# Patient Record
Sex: FEMALE | Race: WHITE | NOT HISPANIC OR LATINO | Employment: OTHER
[De-identification: names, ages, dates, MRNs, and addresses within clinical notes are randomized per-mention and may not be internally consistent; named-entity substitution may affect disease eponyms.]

---

## 2017-04-03 ENCOUNTER — RECORDS - HEALTHEAST (OUTPATIENT)
Dept: ADMINISTRATIVE | Facility: OTHER | Age: 52
End: 2017-04-03

## 2017-05-24 ENCOUNTER — HOSPITAL ENCOUNTER (OUTPATIENT)
Dept: BONE DENSITY | Facility: HOSPITAL | Age: 52
Discharge: HOME | End: 2017-05-24
Attending: FAMILY MEDICINE

## 2017-05-24 DIAGNOSIS — Z78.0 POST-MENOPAUSAL: ICD-10-CM

## 2017-11-13 LAB
HPV INTERPRETATION - HISTORICAL: NORMAL
HPV INTERPRETER - HISTORICAL: NORMAL

## 2017-11-15 ENCOUNTER — RECORDS - HEALTHEAST (OUTPATIENT)
Dept: ADMINISTRATIVE | Facility: OTHER | Age: 52
End: 2017-11-15

## 2017-11-15 LAB
BKR LAB AP ABNORMAL BLEEDING: NO
BKR LAB AP BIRTH CONTROL/HORMONES: ABNORMAL
BKR LAB AP CERVICAL APPEARANCE: NORMAL
BKR LAB AP GYN ADEQUACY: ABNORMAL
BKR LAB AP GYN INTERPRETATION: ABNORMAL
BKR LAB AP HPV REFLEX: ABNORMAL
BKR LAB AP LMP: ABNORMAL
BKR LAB AP PATIENT STATUS: ABNORMAL
BKR LAB AP PREVIOUS ABNORMAL: ABNORMAL
BKR LAB AP PREVIOUS NORMAL: 2014
HIGH RISK?: NO
INTERPRETING LAB: ABNORMAL
PATH REPORT.COMMENTS IMP SPEC: ABNORMAL
RESULT FLAG (HE HISTORICAL CONVERSION): ABNORMAL

## 2019-01-24 ENCOUNTER — RECORDS - HEALTHEAST (OUTPATIENT)
Dept: LAB | Facility: CLINIC | Age: 54
End: 2019-01-24

## 2019-01-24 LAB — TSH SERPL DL<=0.005 MIU/L-ACNC: 0.89 UIU/ML (ref 0.3–5)

## 2020-07-02 ENCOUNTER — RECORDS - HEALTHEAST (OUTPATIENT)
Dept: ADMINISTRATIVE | Facility: OTHER | Age: 55
End: 2020-07-02

## 2020-07-09 ENCOUNTER — AMBULATORY - HEALTHEAST (OUTPATIENT)
Dept: SCHEDULING | Facility: CLINIC | Age: 55
End: 2020-07-09

## 2020-07-09 DIAGNOSIS — M85.80 OSTEOPENIA: ICD-10-CM

## 2020-07-23 ENCOUNTER — RECORDS - HEALTHEAST (OUTPATIENT)
Dept: LAB | Facility: CLINIC | Age: 55
End: 2020-07-23

## 2020-07-23 LAB
ANION GAP SERPL CALCULATED.3IONS-SCNC: 9 MMOL/L (ref 5–18)
BUN SERPL-MCNC: 16 MG/DL (ref 8–22)
CALCIUM SERPL-MCNC: 10.1 MG/DL (ref 8.5–10.5)
CHLORIDE BLD-SCNC: 106 MMOL/L (ref 98–107)
CHOLEST SERPL-MCNC: 151 MG/DL
CO2 SERPL-SCNC: 26 MMOL/L (ref 22–31)
CREAT SERPL-MCNC: 0.77 MG/DL (ref 0.6–1.1)
FASTING STATUS PATIENT QL REPORTED: NORMAL
GFR SERPL CREATININE-BSD FRML MDRD: >60 ML/MIN/1.73M2
GLUCOSE BLD-MCNC: 83 MG/DL (ref 70–125)
HDLC SERPL-MCNC: 58 MG/DL
LDLC SERPL CALC-MCNC: 83 MG/DL
POTASSIUM BLD-SCNC: 4.1 MMOL/L (ref 3.5–5)
SODIUM SERPL-SCNC: 141 MMOL/L (ref 136–145)
TRIGL SERPL-MCNC: 50 MG/DL
TSH SERPL DL<=0.005 MIU/L-ACNC: 0.31 UIU/ML (ref 0.3–5)

## 2020-07-24 LAB
25(OH)D3 SERPL-MCNC: 73.9 NG/ML (ref 30–80)
COVID-19 ANTIBODY IGG: NEGATIVE
HPV SOURCE: NORMAL
HUMAN PAPILLOMA VIRUS 16 DNA: NEGATIVE
HUMAN PAPILLOMA VIRUS 18 DNA: NEGATIVE
HUMAN PAPILLOMA VIRUS FINAL DIAGNOSIS: NORMAL
HUMAN PAPILLOMA VIRUS OTHER HR: NEGATIVE
SPECIMEN DESCRIPTION: NORMAL

## 2020-12-07 ENCOUNTER — RECORDS - HEALTHEAST (OUTPATIENT)
Dept: ADMINISTRATIVE | Facility: OTHER | Age: 55
End: 2020-12-07

## 2020-12-07 LAB
LAB AP CHARGES (HE HISTORICAL CONVERSION): NORMAL
PATH REPORT.COMMENTS IMP SPEC: NORMAL
PATH REPORT.FINAL DX SPEC: NORMAL
PATH REPORT.GROSS SPEC: NORMAL
PATH REPORT.MICROSCOPIC SPEC OTHER STN: NORMAL
PATH REPORT.RELEVANT HX SPEC: NORMAL
RESULT FLAG (HE HISTORICAL CONVERSION): NORMAL

## 2021-05-25 ENCOUNTER — RECORDS - HEALTHEAST (OUTPATIENT)
Dept: ADMINISTRATIVE | Facility: CLINIC | Age: 56
End: 2021-05-25

## 2021-05-27 ENCOUNTER — RECORDS - HEALTHEAST (OUTPATIENT)
Dept: ADMINISTRATIVE | Facility: CLINIC | Age: 56
End: 2021-05-27

## 2021-05-28 ENCOUNTER — RECORDS - HEALTHEAST (OUTPATIENT)
Dept: ADMINISTRATIVE | Facility: CLINIC | Age: 56
End: 2021-05-28

## 2021-05-30 ENCOUNTER — RECORDS - HEALTHEAST (OUTPATIENT)
Dept: ADMINISTRATIVE | Facility: CLINIC | Age: 56
End: 2021-05-30

## 2021-05-31 ENCOUNTER — RECORDS - HEALTHEAST (OUTPATIENT)
Dept: ADMINISTRATIVE | Facility: CLINIC | Age: 56
End: 2021-05-31

## 2021-06-01 ENCOUNTER — RECORDS - HEALTHEAST (OUTPATIENT)
Dept: ADMINISTRATIVE | Facility: CLINIC | Age: 56
End: 2021-06-01

## 2021-07-21 ENCOUNTER — RECORDS - HEALTHEAST (OUTPATIENT)
Dept: ADMINISTRATIVE | Facility: CLINIC | Age: 56
End: 2021-07-21

## 2021-08-01 ENCOUNTER — HEALTH MAINTENANCE LETTER (OUTPATIENT)
Age: 56
End: 2021-08-01

## 2021-08-31 ENCOUNTER — LAB REQUISITION (OUTPATIENT)
Dept: LAB | Facility: CLINIC | Age: 56
End: 2021-08-31
Payer: COMMERCIAL

## 2021-08-31 DIAGNOSIS — E03.9 HYPOTHYROIDISM, UNSPECIFIED: ICD-10-CM

## 2021-08-31 LAB
T4 FREE SERPL-MCNC: 1.73 NG/DL (ref 0.7–1.8)
TSH SERPL DL<=0.005 MIU/L-ACNC: 0.11 UIU/ML (ref 0.3–5)

## 2021-08-31 PROCEDURE — 36415 COLL VENOUS BLD VENIPUNCTURE: CPT | Mod: ORL | Performed by: PHYSICIAN ASSISTANT

## 2021-08-31 PROCEDURE — 84443 ASSAY THYROID STIM HORMONE: CPT | Mod: ORL | Performed by: PHYSICIAN ASSISTANT

## 2021-08-31 PROCEDURE — 84439 ASSAY OF FREE THYROXINE: CPT | Mod: ORL | Performed by: PHYSICIAN ASSISTANT

## 2021-09-26 ENCOUNTER — HEALTH MAINTENANCE LETTER (OUTPATIENT)
Age: 56
End: 2021-09-26

## 2021-12-02 ENCOUNTER — LAB REQUISITION (OUTPATIENT)
Dept: LAB | Facility: CLINIC | Age: 56
End: 2021-12-02
Payer: COMMERCIAL

## 2021-12-02 DIAGNOSIS — E03.9 HYPOTHYROIDISM, UNSPECIFIED: ICD-10-CM

## 2021-12-02 LAB
T4 FREE SERPL-MCNC: 0.63 NG/DL (ref 0.7–1.8)
TSH SERPL DL<=0.005 MIU/L-ACNC: 83.87 UIU/ML (ref 0.3–5)

## 2021-12-02 PROCEDURE — 84439 ASSAY OF FREE THYROXINE: CPT | Mod: ORL | Performed by: PHYSICIAN ASSISTANT

## 2021-12-02 PROCEDURE — 84443 ASSAY THYROID STIM HORMONE: CPT | Mod: ORL | Performed by: PHYSICIAN ASSISTANT

## 2021-12-14 ENCOUNTER — ANCILLARY PROCEDURE (OUTPATIENT)
Dept: MAMMOGRAPHY | Facility: CLINIC | Age: 56
End: 2021-12-14
Attending: PHYSICIAN ASSISTANT
Payer: COMMERCIAL

## 2021-12-14 DIAGNOSIS — Z12.31 VISIT FOR SCREENING MAMMOGRAM: ICD-10-CM

## 2021-12-14 PROCEDURE — 77067 SCR MAMMO BI INCL CAD: CPT | Mod: TC | Performed by: RADIOLOGY

## 2022-01-16 ENCOUNTER — HEALTH MAINTENANCE LETTER (OUTPATIENT)
Age: 57
End: 2022-01-16

## 2022-02-09 ENCOUNTER — ANCILLARY PROCEDURE (OUTPATIENT)
Dept: VASCULAR ULTRASOUND | Facility: CLINIC | Age: 57
End: 2022-02-09
Attending: SPECIALIST
Payer: COMMERCIAL

## 2022-02-09 ENCOUNTER — OFFICE VISIT (OUTPATIENT)
Dept: VASCULAR SURGERY | Facility: CLINIC | Age: 57
End: 2022-02-09
Attending: SPECIALIST
Payer: COMMERCIAL

## 2022-02-09 VITALS — SYSTOLIC BLOOD PRESSURE: 100 MMHG | DIASTOLIC BLOOD PRESSURE: 64 MMHG | TEMPERATURE: 98.7 F | HEART RATE: 72 BPM

## 2022-02-09 DIAGNOSIS — I83.893 VARICOSE VEINS OF BILATERAL LOWER EXTREMITIES WITH OTHER COMPLICATIONS: Primary | ICD-10-CM

## 2022-02-09 DIAGNOSIS — I83.893 VARICOSE VEINS OF BILATERAL LOWER EXTREMITIES WITH OTHER COMPLICATIONS: ICD-10-CM

## 2022-02-09 PROCEDURE — 93970 EXTREMITY STUDY: CPT | Mod: 26 | Performed by: SURGERY

## 2022-02-09 PROCEDURE — 93970 EXTREMITY STUDY: CPT

## 2022-02-09 PROCEDURE — 99203 OFFICE O/P NEW LOW 30 MIN: CPT | Performed by: SPECIALIST

## 2022-02-09 PROCEDURE — G0463 HOSPITAL OUTPT CLINIC VISIT: HCPCS

## 2022-02-09 RX ORDER — LEVOTHYROXINE SODIUM 175 UG/1
175 TABLET ORAL DAILY
COMMUNITY
Start: 2022-02-02

## 2022-02-09 RX ORDER — ALENDRONATE SODIUM 70 MG/1
TABLET ORAL
COMMUNITY
Start: 2021-12-11

## 2022-02-09 ASSESSMENT — PAIN SCALES - GENERAL: PAINLEVEL: NO PAIN (0)

## 2022-02-09 NOTE — PATIENT INSTRUCTIONS
SCLEROTHERAPY  Dr. Eunice Song  [phone]      Sclerotherapy    The most common way to treat spider and small varicose veins is sclerotherapy. This is a simple office procedure. After treatment, you can return to your daily activities right away. For best results, some veins may need to be treated more than once.  What sclerotherapy does  Sclerotherapy causes spider and small varicose veins to disappear over time. This is done by injecting the veins with a chemical that causes them to scar and collapse. Blood is then rerouted to flow only through the healthy veins. Over time, the treated veins go away. Sclerotherapy will not prevent new spider and varicose veins from forming   What this treatment involves  Your healthcare provider injects your affected veins using a fine needle. In most cases, this isn t painful. You may feel a slight burning or stinging. Many veins can be treated in a single session. But some veins may need to be injected more than once. After sclerotherapy, the injection sites are covered with tape and gauze. You may also have to wear bandages or elastic stockings for up to a week.  Recovering at home  At first, your legs will most likely be bruised. For a brief time, they may even look worse than they did before treatment. But don t worry. You can expect to see good results in 6 to 8 weeks. For the best outcome, here are some helpful hints:    Wear elastic stockings or bandages as directed.    Elevate your legs as instructed to help reduce swelling.    Walk each day to increase blood flow.    Follow your healthcare provider s advice about other kinds of exercise.  When to call your healthcare provider  Call your healthcare provider right away if you notice any of the following:    Pain in your legs or feet    Bleeding at injection sites    Extreme swelling or bruising    Fever    Chest pain    Skin breakdown at the site of the injection  Lia last reviewed this educational content on  "11/1/2019 2000-2021 [employer]. All rights reserved. This information is not intended as a substitute for professional medical care. Always follow your healthcare professional's instructions.    We are prescribing some compression stockings for you. I have included different suppliers that should help you get measured and fitting to ensure proper fitting socks. You should wear these socks as much as you can. It is especially important to wear them with long periods of sitting/standing, long car rides or if you will be flying. Compression socks should get refilled every 4-6 months. They do not need to be worn at night while in bed.    If you do a lot of standing it is good to do calf raises to help keep the blood pumping. If you sit a lot at work it is good to get up periodically to walk around. Elevation of the foot of your bed 4-6\" helps the blood return back to where it is needed.      Varicose Veins  Varicose veins are swollen, enlarged veins most often found in the legs. They are usually blue or purple in color and may bulge, twist, and stand out under the skin.  Normally, veins return blood from the body to the heart. The leg veins have one-way valves that prevent blood from flowing backward in the vein. When the valves are weak or damaged, blood backs up in the veins. This may cause some of the veins to swell and bulge and become varicose veins.    Symptoms  Varicose veins may or may not cause symptoms. If symptoms do occur, they can include:    Legs that feel tired, achy, heavy, or itchy    Leg muscle cramps    Skin changes, such as discoloration, dryness, redness, or rash (in more severe cases, you may also have sores on the skin called venous leg ulcers)    Risk Factors  There are a number of factors that increase the risk for varicose veins. These can include:    Being a woman    Being older    Sitting or standing for long periods    Being overweight    Being pregnant    Having a family " history of varicose veins  Treatment begins with simple self-help measures (see below). If these don't help, there are many procedures that can be done to shrink or remove varicose veins. Your healthcare provider can tell you more about these options, if needed.    Home care    Support or compression stockings will likely be prescribed. If so, be sure to wear them as directed. They may help improve blood flow.    Exercising helps strengthen your leg muscles and improve blood flow. To get the most benefit, choose exercises such as walking, swimming, or cycling. Also try to exercise for at least 30 minutes on most days.    Raising (elevating) your legs lets gravity help blood flow back to the heart. Sit or lie with your feet above heart level a few times throughout the day, or as directed.    Avoid long periods of sitting or standing. Change positions often. Also, move your ankles, toes and knees often. This may also help improve blood flow.    If you are overweight, talk with your healthcare provider about setting up a weight-loss plan. Maintaining a healthy weight can help reduce the strain on your veins. It may also improve symptoms, such as swelling and aching.    If you have dryness and itching, ask your provider about special lotions that can be applied to the skin to help improve symptoms.    Follow-up care  Follow up with your healthcare provider, or as directed. If imaging tests were done, you'll be told the results and if there are any new findings that affect your care.    When to seek medical advice  Call your healthcare provider right away if any of these occur:    Sudden, severe leg swelling, pain, or redness    Symptoms worsen, or they don't improve with self-care    Bleeding from any affected veins    Ulcers form on the legs, ankles, or feet    Fever of 100.4 F (38 C) or higher, or as advised by your provider    Understanding Spider and Varicose Veins  Do you often hide your legs because of the way  they look? You may have noticed tiny red or blue bursts (spider veins). Or maybe you have veins that bulge or look twisted (varicose veins). If so, there are treatments that can help    What are the symptoms?  Spider veins or varicose veins may never be a problem. But sometimes they can cause legs to ache or swell. Your legs may also feel heavy and tired, or like they're burning. These symptoms may be more severe at the end of the day. Prolonged sitting or standing can also make your symptoms worse.    Who gets spider and varicose veins?  Anyone can get spider or varicose veins. But vein problems tend to be hereditary (run in families). Other factors that can affect veins include:    Pregnancy, hormones, and birth control pills    A job where you stand or sit a lot    Extra weight or lack of exercise    Age    What can be done?  Spider and varicose veins can affect the way you feel about yourself. Talk to your healthcare provider about your concerns. There are treatments that can ease symptoms and make your legs look better.    Your treatment choices  Treatment may include self-care, sclerotherapy (injecting veins with a chemical), surgery, or newer nonsurgical minimally invasive therapies. Spider veins and some varicose veins can be treated with sclerotherapy. Large varicose veins can often be treated with newer minimally invasive procedures and, in rare cases, surgery may be needed.     Laser Vein Therapy Information - $220/session    Laser vein therapy uses a laser to diminish the appearance of spider veins quickly, safely and effectively without injections. It works by delivering pulses of light energy causing the blood within the vein to gel, destroying the vessel, which is reabsorbed by your body.      Laser vein therapy is ideal for small facial veins, spider veins and blue leg veins. Laser vein therapy is generally performed on the legs and face.    We recommend at least two to three treatments. The number  necessary depends on the number, color and size of the vessels being treated.  Treatment is given by a nurse certified in laser therapy. She has received special training in diminishing the appearance of spider and facial veins.  Please no use of self-tanners before treatments. NO LOTIONS DAY OF TREATMENTS.     What to expect during the procedure:    You will be given protective eyewear for the laser treatment.    You will feel a cold sensation over the area to be treated.    When the laser shines on the skin, you will experience a stinging sensation.    There may be some minor discomfort immediately following treatment.    You may have some redness and slight bruising.    The majority of the treated veins show improvement within two to six weeks of treatment. However, your final results may not be apparent for several months.       Over time it is possible for new veins to appear. These new veins can be treated as needed.    Recommendations after treatment    Wear SPF 30 or greater on all treated areas that are exposed to the sun.    Do not exercise heavily for the two to three days following treatment.    Wear compression for the two to three days following treatment.    To help reduce swelling and redness, apply cold packs to the area as needed for five to ten minutes every one to two hours after treatment.    Acetaminophen may be used for discomfort if necessary.    Post-inflammatory hyperpigmentation is common and tends to resolve over time.    The area might be raised after treatment. This will resolve over time.    Do not soak in a warm bath, sauna or hot tub for the next two to three days following treatment.    Do not have laser therapy if you:    Are pregnant or breastfeeding.    Have an active cold sore    Follow Up    It is necessary to wait at least four to six weeks between treatments.    We recommend at least three to four treatments for optimal results.    Please call if you have any questions.

## 2022-02-09 NOTE — NURSING NOTE
This is a new consult for Varicose Veins. The patient has varicose veins that are problematic in right legs. Symptoms patient has been experiencing are cramps. Patient has not been wearing compression stockings.     Discoloration is not present.  Pt has not been using pain medication or antiinflammatory's.

## 2022-02-15 NOTE — PROGRESS NOTES
[facility] Vein Consult      Assessment:     1. spider veins, bilateral     Plan:     1. Treatment options of conservative therapy of stockings use, exercise, weight loss, elevating legs when possible.    2. Script for compression stockings 20-30 mm hg  3. Ultrasound to evaluate legs for incompetency of both deep and superficial system .   4. Surgical treatment   Laser treatment or sclerotherapy treatment of bilateral  spider veins     Risks and benefits of surgical intervention including infection, burns, dvt, thrombophlebitis, not closing, recurrence, numbness and nerve injury and need for further intervention were all discused    5. Follow up: for laser or for any issues or problems.   6. Call for any questions concerns or issues    Subjective:      Torrie Meyer is a 57 year old female  who was referred by Merry Cash  for evaluation of varicose veins. Symptoms include pain, aching, fatigue, burning, edema and dermatitis. Patient has history of leg selling, pain and vein issues that have progressed. Pain and symptoms have affected daily living and work activities needing medications. Here for evaluation today. no stocking or compression devic use    Allergies:Patient has no known allergies.    History reviewed. No pertinent past medical history.    History reviewed. No pertinent surgical history.      Current Outpatient Medications:      alendronate (FOSAMAX) 70 MG tablet, TAKE 1 TABLET BY MOUTH 1 TIME A WEEK, Disp: , Rfl:      levothyroxine (SYNTHROID/LEVOTHROID) 175 MCG tablet, TAKE 1 TABLET BY MOUTH EVERY DAY IN THE MORNING ON AN EMPTY STOMACH, Disp: , Rfl:      Family History   Problem Relation Age of Onset     Cancer Mother         stomach     Cancer Father         prostate     No Known Problems Sister      No Known Problems Daughter      No Known Problems Maternal Grandmother      No Known Problems Maternal Grandfather      No Known Problems Paternal Grandmother      No Known Problems  Paternal Grandfather      No Known Problems Maternal Aunt      No Known Problems Paternal Aunt      Hereditary Breast and Ovarian Cancer Syndrome No family hx of      Breast Cancer No family hx of      Colon Cancer No family hx of      Endometrial Cancer No family hx of      Ovarian Cancer No family hx of         reports that she has never smoked. She has never used smokeless tobacco.      Review of Systems:    Pertinent items are noted in HPI.  Patient has symptomatic veins and changes of bilateral legs. These have progressed to the point of causing symptoms on a daily basis. This causes issues with daily activities and chores such as washing dishes, vacuuming, outdoor upkeep and standing for long lengths of time       Objective:     Vitals:    02/09/22 1009   BP: 100/64   Pulse: 72   Temp: 98.7  F (37.1  C)     There is no height or weight on file to calculate BMI.    EXAM:  GENERAL: This is a well-developed 57 year old female who appears her stated age  HEAD: normocephalic  HEENT: Pupils equal and reactive bilaterally  MOUTH: mucus membranes intact. Normal dentation  CARDIAC: RRR without murmur  CHEST/LUNG:  Clear to auscultation bilaterally  ABDOMEN: Soft, nontender, nondistended, no masses noted   NEUROLOGIC: Focally intact, nonfocal, alert and oriented x 3  INTEGUMENT: No open lesions or ulcers  VASCULAR: Pulses intact, symmetrical upper and lower extremities. There areskin changes consistent with chronic venous insufficiency. Spider veins present bilateral.        VCSS  PAIN:: Mild: Occasional, not restricting activity of requiring pain medication  Varicose Veins:: Absent: None  Venous Edema:: Absent: None        Skin Pigmentation:: Absent: None  Inflamation:: Absent: None  Induration:: Absent: None  Number of active ulcers:: 0  Active ulcer duration:: None  Active ulcer diameter:: None  Compression Therapy:: Not used or patient not compliant  VCSS Score:: 1  Side:: Right      Marcelino VICKERS  MD Leana  General Surgery [phone]  Vascular Surgery [phone]

## 2022-04-12 ENCOUNTER — LAB REQUISITION (OUTPATIENT)
Dept: LAB | Facility: CLINIC | Age: 57
End: 2022-04-12

## 2022-04-12 DIAGNOSIS — R10.13 EPIGASTRIC PAIN: ICD-10-CM

## 2022-04-12 LAB
ALBUMIN SERPL-MCNC: 4.2 G/DL (ref 3.5–5)
ALP SERPL-CCNC: 82 U/L (ref 45–120)
ALT SERPL W P-5'-P-CCNC: 21 U/L (ref 0–45)
ANION GAP SERPL CALCULATED.3IONS-SCNC: 10 MMOL/L (ref 5–18)
AST SERPL W P-5'-P-CCNC: 19 U/L (ref 0–40)
BILIRUB SERPL-MCNC: 0.8 MG/DL (ref 0–1)
BUN SERPL-MCNC: 19 MG/DL (ref 8–22)
C REACTIVE PROTEIN LHE: 0.8 MG/DL (ref 0–0.8)
CALCIUM SERPL-MCNC: 10.3 MG/DL (ref 8.5–10.5)
CHLORIDE BLD-SCNC: 103 MMOL/L (ref 98–107)
CO2 SERPL-SCNC: 27 MMOL/L (ref 22–31)
CREAT SERPL-MCNC: 0.8 MG/DL (ref 0.6–1.1)
GFR SERPL CREATININE-BSD FRML MDRD: 86 ML/MIN/1.73M2
GLUCOSE BLD-MCNC: 91 MG/DL (ref 70–125)
LIPASE SERPL-CCNC: 13 U/L (ref 0–52)
POTASSIUM BLD-SCNC: 3.8 MMOL/L (ref 3.5–5)
PROT SERPL-MCNC: 7 G/DL (ref 6–8)
SODIUM SERPL-SCNC: 140 MMOL/L (ref 136–145)

## 2022-04-12 PROCEDURE — 86140 C-REACTIVE PROTEIN: CPT | Performed by: FAMILY MEDICINE

## 2022-04-12 PROCEDURE — 80053 COMPREHEN METABOLIC PANEL: CPT | Performed by: FAMILY MEDICINE

## 2022-04-12 PROCEDURE — 83690 ASSAY OF LIPASE: CPT | Performed by: FAMILY MEDICINE

## 2022-04-27 ENCOUNTER — LAB REQUISITION (OUTPATIENT)
Dept: LAB | Facility: CLINIC | Age: 57
End: 2022-04-27

## 2022-04-27 DIAGNOSIS — E05.90 THYROTOXICOSIS, UNSPECIFIED WITHOUT THYROTOXIC CRISIS OR STORM: ICD-10-CM

## 2022-04-27 LAB
T4 FREE SERPL-MCNC: 1.62 NG/DL (ref 0.7–1.8)
TSH SERPL DL<=0.005 MIU/L-ACNC: 0.03 UIU/ML (ref 0.3–5)

## 2022-04-27 PROCEDURE — 84439 ASSAY OF FREE THYROXINE: CPT | Performed by: PHYSICIAN ASSISTANT

## 2022-04-27 PROCEDURE — 84443 ASSAY THYROID STIM HORMONE: CPT | Performed by: PHYSICIAN ASSISTANT

## 2022-08-17 ENCOUNTER — TRANSFERRED RECORDS (OUTPATIENT)
Dept: HEALTH INFORMATION MANAGEMENT | Facility: CLINIC | Age: 57
End: 2022-08-17

## 2022-08-17 ENCOUNTER — LAB REQUISITION (OUTPATIENT)
Dept: LAB | Facility: CLINIC | Age: 57
End: 2022-08-17

## 2022-08-17 DIAGNOSIS — E03.9 HYPOTHYROIDISM, UNSPECIFIED: ICD-10-CM

## 2022-08-17 DIAGNOSIS — I49.9 CARDIAC ARRHYTHMIA, UNSPECIFIED: ICD-10-CM

## 2022-08-17 LAB
ALBUMIN SERPL BCG-MCNC: 5.1 G/DL (ref 3.5–5.2)
ALP SERPL-CCNC: 64 U/L (ref 35–104)
ALT SERPL W P-5'-P-CCNC: 43 U/L (ref 10–35)
ANION GAP SERPL CALCULATED.3IONS-SCNC: 9 MMOL/L (ref 7–15)
AST SERPL W P-5'-P-CCNC: 37 U/L (ref 10–35)
BILIRUB SERPL-MCNC: 0.4 MG/DL
BUN SERPL-MCNC: 18.3 MG/DL (ref 6–20)
CALCIUM SERPL-MCNC: 10.5 MG/DL (ref 8.6–10)
CHLORIDE SERPL-SCNC: 103 MMOL/L (ref 98–107)
CREAT SERPL-MCNC: 0.91 MG/DL (ref 0.51–0.95)
DEPRECATED HCO3 PLAS-SCNC: 28 MMOL/L (ref 22–29)
GFR SERPL CREATININE-BSD FRML MDRD: 74 ML/MIN/1.73M2
GLUCOSE SERPL-MCNC: 61 MG/DL (ref 70–99)
MAGNESIUM SERPL-MCNC: 2 MG/DL (ref 1.7–2.3)
POTASSIUM SERPL-SCNC: 4.6 MMOL/L (ref 3.4–5.3)
PROT SERPL-MCNC: 7.1 G/DL (ref 6.4–8.3)
SODIUM SERPL-SCNC: 140 MMOL/L (ref 136–145)
T4 FREE SERPL-MCNC: 1.04 NG/DL (ref 0.9–1.7)
TSH SERPL DL<=0.005 MIU/L-ACNC: 78.8 UIU/ML (ref 0.3–4.2)

## 2022-08-17 PROCEDURE — 84443 ASSAY THYROID STIM HORMONE: CPT | Performed by: FAMILY MEDICINE

## 2022-08-17 PROCEDURE — 84439 ASSAY OF FREE THYROXINE: CPT | Performed by: FAMILY MEDICINE

## 2022-08-17 PROCEDURE — 80053 COMPREHEN METABOLIC PANEL: CPT | Performed by: FAMILY MEDICINE

## 2022-08-17 PROCEDURE — 83735 ASSAY OF MAGNESIUM: CPT | Performed by: FAMILY MEDICINE

## 2022-08-18 ENCOUNTER — OFFICE VISIT (OUTPATIENT)
Dept: CARDIOLOGY | Facility: CLINIC | Age: 57
End: 2022-08-18
Payer: COMMERCIAL

## 2022-08-18 VITALS
SYSTOLIC BLOOD PRESSURE: 116 MMHG | HEART RATE: 78 BPM | RESPIRATION RATE: 16 BRPM | DIASTOLIC BLOOD PRESSURE: 58 MMHG | WEIGHT: 137 LBS

## 2022-08-18 DIAGNOSIS — I48.91 ATRIAL FIBRILLATION WITH SLOW VENTRICULAR RESPONSE (H): Primary | ICD-10-CM

## 2022-08-18 DIAGNOSIS — E03.9 ACQUIRED HYPOTHYROIDISM: ICD-10-CM

## 2022-08-18 PROCEDURE — 99204 OFFICE O/P NEW MOD 45 MIN: CPT | Performed by: INTERNAL MEDICINE

## 2022-08-18 NOTE — LETTER
8/18/2022    Merry Cash MD  [facility] [address]  [facility] [address]    RE: Torrie Meyer       Dear Colleague,     I had the pleasure of seeing Torrie Meyer in the [facility].      Click to link to [facility] HEART CARE NOTE    Thank you, Dr. Hair, for asking me to see Torrie Meyer in consultation at [facility] to evaluate atrial fibrillation with slow ventricular response.      Assessment/Plan:   1.  Atrial fibrillation with slow ventricular response: Since the patient has no obvious symptoms, it does not know how long she has atrial fibrillation with slow ventricular response.  She felt mild dyspnea on exertion, mild fatigue over.  No other obvious symptoms.  She has no chest pain or shortness of breath.  ECG as reviewed, atrial fibrillation with slow ventricular response 44 bpm.  Her heart rate is 60 to 70 bpm in clinic today.  We discussed further evaluation and management.  Holter monitor and echocardiogram are requested for evaluation of her atrial fibrillation and heart function and structure.  Depend on Holter monitor and echo findings, and then decide the treatment.  Her [id number] score is 1 due to female.  The risk of stroke secondary to atrial fibrillation is very low to her.  Aspirin 81 mg daily is recommended.    2.  Hypothyroidism: Continue levothyroxine, follow-up with Dr. Hair.    Thank you for the opportunity to be involved in the care of Torrie Meyer. If you have any questions, please feel free to contact me.  I will see the patient again in 4 weeks and as needed    Much or all of the text in this note was generated through the use of Dragon Dictate voice-to-text software. Errors in spelling or words which seem out of context are unintentional.   Sound alike errors, in particular, may have escaped editing.       History of Present Illness:   It is my pleasure to see Torrie Meyer at the  [facility] clinic for evaluation of atrial fibrillation with slow ventricular response. Torrie Meyer is a 57 year old female with a medical history of hypothyroidism, recently diagnosed atrial fibrillation with slow ventricular response.    The patient had colonoscopy recently.  Prior to the procedure, she was found to have irregular heartbeat.  ECG demonstrated atrial fibrillation with slow ventricular response.  Her procedure was canceled.  The patient is referred to cardiology clinic for further evaluation and management.    The patient has no obvious palpitations.  She did note that she has mild shortness of breath on exertion, mild fatigue recently.  She was a runner, but not able to do similar activity at this time.  She denies chest pain, dizziness, orthopnea, PND or leg edema.  Her blood pressure is 116/58, pulse is 62 today.    Her ECG is reviewed, atrial fibrillation with slow ventricular response, ventricular rate 44 bpm, nonspecific T wave abnormality.    Past Medical History:   There is no problem list on file for this patient.      Past Surgical History:   No past surgical history on file.    Family History:     Family History   Problem Relation Age of Onset     Cancer Mother         stomach     Cancer Father         prostate     No Known Problems Sister      No Known Problems Daughter      No Known Problems Maternal Grandmother      No Known Problems Maternal Grandfather      No Known Problems Paternal Grandmother      No Known Problems Paternal Grandfather      No Known Problems Maternal Aunt      No Known Problems Paternal Aunt      Hereditary Breast and Ovarian Cancer Syndrome No family hx of      Breast Cancer No family hx of      Colon Cancer No family hx of      Endometrial Cancer No family hx of      Ovarian Cancer No family hx of        Social History:    reports that she has never smoked. She has never used smokeless tobacco.    Review of Systems:   12 systems are reviewed  negative except for in HPI.    Meds:     Current Outpatient Medications:      alendronate (FOSAMAX) 70 MG tablet, TAKE 1 TABLET BY MOUTH 1 TIME A WEEK, Disp: , Rfl:      aspirin (ASA) 81 MG EC tablet, Take 1 tablet (81 mg) by mouth daily, Disp: 90 tablet, Rfl: 4     levothyroxine (SYNTHROID/LEVOTHROID) 175 MCG tablet, Taking half a day, Disp: , Rfl:      Allergies:   Patient has no known allergies.    Objective:      Physical Exam  62.1 kg (137 lb)     There is no height or weight on file to calculate BMI.  /58 (BP Location: Left arm, Patient Position: Sitting, Cuff Size: Adult Regular)   Pulse 78   Resp 16   Wt 62.1 kg (137 lb)     General Appearance:   Awake, Alert, No acute distress.   HEENT:  Pupil equal, reactive to light. No scleral icterus; the mucous membranes were moist. No oral ulcers or thrush.    Neck: No cervical bruits. No JVD. No thyromegaly. No lymph node enlargement or tenderness.   Chest: The spine was straight. The chest was symmetric.   Lungs:   Respirations unlabored. Lungs are clear to auscultation. No crackles. No wheezing.   Cardiovascular:   Irregular, bradycardiac, normal first and second heart sounds with no murmurs. No rubs or gallops.    Abdomen:  Soft. No tenderness. Non-distended. Bowels sounds are present   Extremities: Equal posterior tibial pulses. No leg edema.   Skin: No rashes or ulcers. Warm, Dry.   Musculoskeletal: No tenderness. No deformity.   Neurologic: Mood and affect are appropriate. No focal deficits.         EKG:  Personally reivewed  Atrial fibrillation with slow ventricular response    Lab Review   Lab Results   Component Value Date     08/17/2022    CO2 28 08/17/2022    CO2 27 04/12/2022    BUN 18.3 08/17/2022    BUN 19 04/12/2022     No results found for: WBC, HGB, HCT, MCV, PLT  Lab Results   Component Value Date    CHOL 151 07/23/2020    TRIG 50 07/23/2020    HDL 58 07/23/2020    LDL 83 07/23/2020     LDL Cholesterol Calculated   Date Value Ref  Range Status   07/23/2020 83 <=129 mg/dL Final     No results found for: TROPONINI  No results found for: BNP  Lab Results   Component Value Date    TSH 78.80 08/17/2022    TSH 0.03 04/27/2022               Thank you for allowing me to participate in the care of your patient.      Sincerely,     Anahi Sawant MD     [facility]  cc:   Frank Hair MD  [address]  [place] [address]

## 2022-08-18 NOTE — PROGRESS NOTES
Click to link to [facility] HEART [facility] NOTE    Thank you, Dr. Hair, for asking me to see Torrie Meyer in consultation at [facility] to evaluate atrial fibrillation with slow ventricular response.      Assessment/Plan:   1.  Atrial fibrillation with slow ventricular response: Since the patient has no obvious symptoms, it does not know how long she has atrial fibrillation with slow ventricular response.  She felt mild dyspnea on exertion, mild fatigue over.  No other obvious symptoms.  She has no chest pain or shortness of breath.  ECG as reviewed, atrial fibrillation with slow ventricular response 44 bpm.  Her heart rate is 60 to 70 bpm in clinic today.  We discussed further evaluation and management.  Holter monitor and echocardiogram are requested for evaluation of her atrial fibrillation and heart function and structure.  Depend on Holter monitor and echo findings, and then decide the treatment.  Her [id number] score is 1 due to female.  The risk of stroke secondary to atrial fibrillation is very low to her.  Aspirin 81 mg daily is recommended.    2.  Hypothyroidism: Continue levothyroxine, follow-up with Dr. Hair.    Thank you for the opportunity to be involved in the care of Torrie Meyer. If you have any questions, please feel free to contact me.  I will see the patient again in 4 weeks and as needed    Much or all of the text in this note was generated through the use of Dragon Dictate voice-to-text software. Errors in spelling or words which seem out of context are unintentional.   Sound alike errors, in particular, may have escaped editing.       History of Present Illness:   It is my pleasure to see Torrie Meyer at the [facility] for evaluation of atrial fibrillation with slow ventricular response. Torrie Meyer is a 57 year old female with a medical history of hypothyroidism, recently diagnosed atrial fibrillation with  slow ventricular response.    The patient had colonoscopy recently.  Prior to the procedure, she was found to have irregular heartbeat.  ECG demonstrated atrial fibrillation with slow ventricular response.  Her procedure was canceled.  The patient is referred to cardiology clinic for further evaluation and management.    The patient has no obvious palpitations.  She did note that she has mild shortness of breath on exertion, mild fatigue recently.  She was a runner, but not able to do similar activity at this time.  She denies chest pain, dizziness, orthopnea, PND or leg edema.  Her blood pressure is 116/58, pulse is 62 today.    Her ECG is reviewed, atrial fibrillation with slow ventricular response, ventricular rate 44 bpm, nonspecific T wave abnormality.    Past Medical History:   There is no problem list on file for this patient.      Past Surgical History:   No past surgical history on file.    Family History:     Family History   Problem Relation Age of Onset     Cancer Mother         stomach     Cancer Father         prostate     No Known Problems Sister      No Known Problems Daughter      No Known Problems Maternal Grandmother      No Known Problems Maternal Grandfather      No Known Problems Paternal Grandmother      No Known Problems Paternal Grandfather      No Known Problems Maternal Aunt      No Known Problems Paternal Aunt      Hereditary Breast and Ovarian Cancer Syndrome No family hx of      Breast Cancer No family hx of      Colon Cancer No family hx of      Endometrial Cancer No family hx of      Ovarian Cancer No family hx of        Social History:    reports that she has never smoked. She has never used smokeless tobacco.    Review of Systems:   12 systems are reviewed negative except for in HPI.    Meds:     Current Outpatient Medications:      alendronate (FOSAMAX) 70 MG tablet, TAKE 1 TABLET BY MOUTH 1 TIME A WEEK, Disp: , Rfl:      aspirin (ASA) 81 MG EC tablet, Take 1 tablet (81 mg) by  mouth daily, Disp: 90 tablet, Rfl: 4     levothyroxine (SYNTHROID/LEVOTHROID) 175 MCG tablet, Taking half a day, Disp: , Rfl:      Allergies:   Patient has no known allergies.    Objective:      Physical Exam  62.1 kg (137 lb)     There is no height or weight on file to calculate BMI.  /58 (BP Location: Left arm, Patient Position: Sitting, Cuff Size: Adult Regular)   Pulse 78   Resp 16   Wt 62.1 kg (137 lb)     General Appearance:   Awake, Alert, No acute distress.   HEENT:  Pupil equal, reactive to light. No scleral icterus; the mucous membranes were moist. No oral ulcers or thrush.    Neck: No cervical bruits. No JVD. No thyromegaly. No lymph node enlargement or tenderness.   Chest: The spine was straight. The chest was symmetric.   Lungs:   Respirations unlabored. Lungs are clear to auscultation. No crackles. No wheezing.   Cardiovascular:   Irregular, bradycardiac, normal first and second heart sounds with no murmurs. No rubs or gallops.    Abdomen:  Soft. No tenderness. Non-distended. Bowels sounds are present   Extremities: Equal posterior tibial pulses. No leg edema.   Skin: No rashes or ulcers. Warm, Dry.   Musculoskeletal: No tenderness. No deformity.   Neurologic: Mood and affect are appropriate. No focal deficits.         EKG:  Personally reivewed  Atrial fibrillation with slow ventricular response    Lab Review   Lab Results   Component Value Date     08/17/2022    CO2 28 08/17/2022    CO2 27 04/12/2022    BUN 18.3 08/17/2022    BUN 19 04/12/2022     No results found for: WBC, HGB, HCT, MCV, PLT  Lab Results   Component Value Date    CHOL 151 07/23/2020    TRIG 50 07/23/2020    HDL 58 07/23/2020    LDL 83 07/23/2020     LDL Cholesterol Calculated   Date Value Ref Range Status   07/23/2020 83 <=129 mg/dL Final     No results found for: TROPONINI  No results found for: BNP  Lab Results   Component Value Date    TSH 78.80 08/17/2022    TSH 0.03 04/27/2022

## 2022-08-23 ENCOUNTER — HOSPITAL ENCOUNTER (EMERGENCY)
Facility: CLINIC | Age: 57
End: 2022-08-23
Payer: COMMERCIAL

## 2022-08-23 ENCOUNTER — HOSPITAL ENCOUNTER (OUTPATIENT)
Dept: CARDIOLOGY | Facility: CLINIC | Age: 57
Discharge: HOME | End: 2022-08-23
Attending: INTERNAL MEDICINE
Payer: COMMERCIAL

## 2022-08-23 ENCOUNTER — HOSPITAL ENCOUNTER (INPATIENT)
Facility: HOSPITAL | Age: 57
LOS: 12 days | Discharge: HOME | End: 2022-09-04
Attending: EMERGENCY MEDICINE | Admitting: INTERNAL MEDICINE
Payer: COMMERCIAL

## 2022-08-23 DIAGNOSIS — Z98.890 STATUS POST CARDIAC SURGERY: Primary | ICD-10-CM

## 2022-08-23 DIAGNOSIS — I51.89 LEFT ATRIAL MASS: ICD-10-CM

## 2022-08-23 DIAGNOSIS — R06.02 SHORT OF BREATH ON EXERTION: ICD-10-CM

## 2022-08-23 DIAGNOSIS — I48.91 ATRIAL FIBRILLATION WITH SLOW VENTRICULAR RESPONSE (H): ICD-10-CM

## 2022-08-23 DIAGNOSIS — D15.1 ATRIAL MYXOMA: ICD-10-CM

## 2022-08-23 PROBLEM — R00.1 SINUS BRADYCARDIA: Status: ACTIVE | Noted: 2022-08-23

## 2022-08-23 LAB
ANION GAP SERPL CALCULATED.3IONS-SCNC: 8 MMOL/L (ref 5–18)
BASOPHILS # BLD AUTO: 0.1 10E3/UL (ref 0–0.2)
BASOPHILS NFR BLD AUTO: 1 %
BUN SERPL-MCNC: 18 MG/DL (ref 8–22)
CALCIUM SERPL-MCNC: 9.5 MG/DL (ref 8.5–10.5)
CHLORIDE BLD-SCNC: 107 MMOL/L (ref 98–107)
CHOLEST SERPL-MCNC: 187 MG/DL
CO2 SERPL-SCNC: 26 MMOL/L (ref 22–31)
CREAT SERPL-MCNC: 0.85 MG/DL (ref 0.6–1.1)
EOSINOPHIL # BLD AUTO: 0 10E3/UL (ref 0–0.7)
EOSINOPHIL NFR BLD AUTO: 1 %
ERYTHROCYTE [DISTWIDTH] IN BLOOD BY AUTOMATED COUNT: 13.3 % (ref 10–15)
GFR SERPL CREATININE-BSD FRML MDRD: 80 ML/MIN/1.73M2
GLUCOSE BLD-MCNC: 79 MG/DL (ref 70–125)
HCT VFR BLD AUTO: 40.9 % (ref 35–47)
HDLC SERPL-MCNC: 62 MG/DL
HGB BLD-MCNC: 14.3 G/DL (ref 11.7–15.7)
HOLD SPECIMEN: NORMAL
IMM GRANULOCYTES # BLD: 0 10E3/UL
IMM GRANULOCYTES NFR BLD: 0 %
LDLC SERPL CALC-MCNC: 112 MG/DL
LVEF ECHO: NORMAL
LYMPHOCYTES # BLD AUTO: 2.5 10E3/UL (ref 0.8–5.3)
LYMPHOCYTES NFR BLD AUTO: 43 %
MAGNESIUM SERPL-MCNC: 1.7 MG/DL (ref 1.8–2.6)
MCH RBC QN AUTO: 33.1 PG (ref 26.5–33)
MCHC RBC AUTO-ENTMCNC: 35 G/DL (ref 31.5–36.5)
MCV RBC AUTO: 95 FL (ref 78–100)
MONOCYTES # BLD AUTO: 0.3 10E3/UL (ref 0–1.3)
MONOCYTES NFR BLD AUTO: 5 %
NEUTROPHILS # BLD AUTO: 2.9 10E3/UL (ref 1.6–8.3)
NEUTROPHILS NFR BLD AUTO: 50 %
NRBC # BLD AUTO: 0 10E3/UL
NRBC BLD AUTO-RTO: 0 /100
PLATELET # BLD AUTO: 206 10E3/UL (ref 150–450)
POTASSIUM BLD-SCNC: 3.5 MMOL/L (ref 3.5–5)
RBC # BLD AUTO: 4.32 10E6/UL (ref 3.8–5.2)
SARS-COV-2 RNA RESP QL NAA+PROBE: NEGATIVE
SODIUM SERPL-SCNC: 141 MMOL/L (ref 136–145)
TRIGL SERPL-MCNC: 66 MG/DL
WBC # BLD AUTO: 5.8 10E3/UL (ref 4–11)

## 2022-08-23 PROCEDURE — 85004 AUTOMATED DIFF WBC COUNT: CPT | Performed by: EMERGENCY MEDICINE

## 2022-08-23 PROCEDURE — 36415 COLL VENOUS BLD VENIPUNCTURE: CPT | Performed by: EMERGENCY MEDICINE

## 2022-08-23 PROCEDURE — 83735 ASSAY OF MAGNESIUM: CPT | Performed by: INTERNAL MEDICINE

## 2022-08-23 PROCEDURE — 93005 ELECTROCARDIOGRAM TRACING: CPT | Performed by: EMERGENCY MEDICINE

## 2022-08-23 PROCEDURE — 210N000001 HC R&B IMCU HEART CARE

## 2022-08-23 PROCEDURE — 99285 EMERGENCY DEPT VISIT HI MDM: CPT | Mod: CS,25

## 2022-08-23 PROCEDURE — U0003 INFECTIOUS AGENT DETECTION BY NUCLEIC ACID (DNA OR RNA); SEVERE ACUTE RESPIRATORY SYNDROME CORONAVIRUS 2 (SARS-COV-2) (CORONAVIRUS DISEASE [COVID-19]), AMPLIFIED PROBE TECHNIQUE, MAKING USE OF HIGH THROUGHPUT TECHNOLOGIES AS DESCRIBED BY CMS-2020-01-R: HCPCS | Performed by: EMERGENCY MEDICINE

## 2022-08-23 PROCEDURE — 82310 ASSAY OF CALCIUM: CPT | Performed by: EMERGENCY MEDICINE

## 2022-08-23 PROCEDURE — C9803 HOPD COVID-19 SPEC COLLECT: HCPCS

## 2022-08-23 PROCEDURE — 93306 TTE W/DOPPLER COMPLETE: CPT

## 2022-08-23 PROCEDURE — 80061 LIPID PANEL: CPT | Performed by: INTERNAL MEDICINE

## 2022-08-23 PROCEDURE — 93306 TTE W/DOPPLER COMPLETE: CPT | Mod: 26 | Performed by: INTERNAL MEDICINE

## 2022-08-23 PROCEDURE — 99223 1ST HOSP IP/OBS HIGH 75: CPT | Mod: AI | Performed by: INTERNAL MEDICINE

## 2022-08-23 PROCEDURE — 250N000013 HC RX MED GY IP 250 OP 250 PS 637: Performed by: INTERNAL MEDICINE

## 2022-08-23 RX ORDER — LANOLIN ALCOHOL/MO/W.PET/CERES
1000 CREAM (GRAM) TOPICAL DAILY
COMMUNITY

## 2022-08-23 RX ORDER — CHLORAL HYDRATE 500 MG
1 CAPSULE ORAL DAILY
COMMUNITY

## 2022-08-23 RX ORDER — VITAMIN B COMPLEX
1 TABLET ORAL DAILY
COMMUNITY

## 2022-08-23 RX ORDER — ASPIRIN 81 MG/1
81 TABLET ORAL DAILY
Status: DISCONTINUED | OUTPATIENT
Start: 2022-08-24 | End: 2022-08-24

## 2022-08-23 RX ORDER — ONDANSETRON 2 MG/ML
4 INJECTION INTRAMUSCULAR; INTRAVENOUS EVERY 6 HOURS PRN
Status: DISCONTINUED | OUTPATIENT
Start: 2022-08-23 | End: 2022-08-29

## 2022-08-23 RX ORDER — ONDANSETRON 4 MG/1
4 TABLET, ORALLY DISINTEGRATING ORAL EVERY 6 HOURS PRN
Status: DISCONTINUED | OUTPATIENT
Start: 2022-08-23 | End: 2022-08-29

## 2022-08-23 RX ORDER — MULTIPLE VITAMINS W/ MINERALS TAB 9MG-400MCG
1 TAB ORAL DAILY
COMMUNITY

## 2022-08-23 RX ORDER — POTASSIUM CHLORIDE 1500 MG/1
40 TABLET, EXTENDED RELEASE ORAL ONCE
Status: COMPLETED | OUTPATIENT
Start: 2022-08-23 | End: 2022-08-23

## 2022-08-23 RX ORDER — TRAZODONE HYDROCHLORIDE 50 MG/1
50 TABLET, FILM COATED ORAL AT BEDTIME
Status: DISCONTINUED | OUTPATIENT
Start: 2022-08-23 | End: 2022-08-29

## 2022-08-23 RX ORDER — LANOLIN ALCOHOL/MO/W.PET/CERES
1000 CREAM (GRAM) TOPICAL DAILY
Status: DISCONTINUED | OUTPATIENT
Start: 2022-08-24 | End: 2022-08-29

## 2022-08-23 RX ORDER — MULTIVIT WITH MINERALS/LUTEIN
250 TABLET ORAL DAILY
COMMUNITY

## 2022-08-23 RX ADMIN — POTASSIUM CHLORIDE 40 MEQ: 1500 TABLET, EXTENDED RELEASE ORAL at 21:09

## 2022-08-23 RX ADMIN — TRAZODONE HYDROCHLORIDE 50 MG: 50 TABLET ORAL at 23:11

## 2022-08-23 ASSESSMENT — ACTIVITIES OF DAILY LIVING (ADL)
ADLS_ACUITY_SCORE: 35
ADLS_ACUITY_SCORE: 33
ADLS_ACUITY_SCORE: 35

## 2022-08-23 NOTE — ED NOTES
"    ED Provider In Triage Note  [facility]  Encounter Date: Aug 23, 2022    Chief Complaint   Patient presents with     Breathing Problem     Pt sent from cardiologist after ECHO today was abnormal.         Use of Intrepreter: N/A    Brief HPI:   Torrie Meyer is a 57 year old female presenting to the Emergency Department with a chief complaint of cardiac mass on ECHO. Sent to ER for admission and cardiology evaluation.    Denies chest pain or SOB currently.      Brief Physical Exam:  Pulse 68   Temp 97  F (36.1  C) (Temporal)   Resp 20   Ht 1.549 m (5' 1\")   Wt 59.9 kg (132 lb)   SpO2 99%   BMI 24.94 kg/m    General: Non-toxic appearing  HEENT: Atraumatic  Resp: No respiratory distress  Abdomen: Non-peritoneal  Neuro: Alert, oriented , answers questions appropriately  Psych: Behavior appropriate  Musculoskeletal: atraumatic      Plan Initiated in Triage:  Orders Placed This Encounter     [place] Draw     Basic metabolic panel     Asymptomatic COVID-19 Virus (Coronavirus) by PCR     Will do basic labs and plan iwll be admission   as recommended by cardiology.    PIT Dispo:   Return to lobby while awaiting workup and ED bed availability          Latia Chacon MD on 8/23/2022 at 4:54 PM        Patient was evaluated by the Physician in Triage due to a limitation of available rooms in the Emergency Department. A plan of care was discussed based on the information obtained on the initial evaluation and patient was counseled to return back to the Emergency Department lobby after this initial evalutaiton until results were obtained or a room became available in the Emergency Department. Patient was counseled not to leave prior to receiving the results of their workup.            Latia Chacon MD  08/23/22 5597    "

## 2022-08-23 NOTE — ED NOTES
Expected Patient Referral to ED  2:31 PM    Referring Clinic/Provider:  Cardiology    Reason for referral/Clinical facts:  - 4cm diameter atrial myoma on echo, in [facility] now getting this  - Cardiology called from echo lab, has not seen patient  - recommends starting AC and evaluating for possible admission    Recommendations provided:  Send to ED for further evaluation    Caller was informed that this institution does possess the capabilities and/or resources to provide for patient and should be transferred to our facility.    Discussed that if direct admit is sought and any hurdles are encountered, this ED would be happy to see the patient and evaluate.    Informed caller that recommendations provided are recommendations based only on the facts provided and that they responsible to accept or reject the advice, or to seek a formal in person consultation as needed and that this ED will see/treat patient should they arrive.      Reji Clark MD  [facility] EMERGENCY ROOM  [address] [address]  [phone]     Reji Clark MD  08/23/22 5680

## 2022-08-23 NOTE — ED PROVIDER NOTES
EMERGENCY DEPARTMENT ENCOUNTER      NAME: Torrie Meyer  AGE: 57 year old female  YOB: 1965  MRN: [id number]  EVALUATION DATE & TIME: No admission date for patient encounter.    PCP: Merry Cash    ED PROVIDER: Luz Maria Ramirez M.D.      Chief Complaint   Patient presents with     Breathing Problem     Pt sent from cardiologist after ECHO today was abnormal.     FINAL IMPRESSION:  1. Atrial myxoma    2. Short of breath on exertion      ED COURSE & MEDICAL DECISION MAKING:    Pertinent Labs & Imaging studies reviewed. (See chart for details)  ED Course as of 08/24/22 0013   Tue Aug 23, 2022   1817 Patient is a 57-year-old female comes in today for evaluation after she had an echocardiogram that showed a left atrial myxoma.  She has noticed some shortness of breath on exertion has been going on for some time.  She is a regular exerciser and does not feel like that ever gets better but she had not thought much of it.  She was getting a colonoscopy this week and they did an EKG which showed A. fib with slow ventricular response.  They sent her in to get evaluated which then got her an echocardiogram which I did today over at [facility] where they found this mass in her atria and sent her here to get admitted to the hospital.  I will put a call out to cardiology and a call out to the admitting team and we will get her admitted to the hospital.  She feels comfortable right here.  She was running last week and not having significant issues.  I discussed the plan with her and she is in agreement.  Physical exam was unremarkable.   1822 I spoke with Dr. Gonzalez with cardiology.  He agreed with admission to the hospital but said there was nothing to do tonight otherwise.   1836 I spoke with Dr. Mendosa with the hospitalist service.  She agreed with cardiac telemetry observation admission.   6:15 PM I met with the patient, obtained history, performed an initial exam, and discussed options and plan for  diagnostics and treatment here in the ED.  6:23 PM Spoke with Cardiologist, Dr. Gonzalez.  6:35 PM Spoke with Hospitalist, Dr. Mendosa, who is in agreement with admission.     At the conclusion of the encounter I discussed  the results of all of the tests and the disposition with patient.   All questions were answered.  The patient acknowledged understanding and was involved in the decision making regarding the overall care plan.      MEDICATIONS GIVEN IN THE EMERGENCY:  Medications   melatonin tablet 1 mg (has no administration in time range)   ondansetron (ZOFRAN ODT) ODT tab 4 mg (has no administration in time range)     Or   ondansetron (ZOFRAN) injection 4 mg (has no administration in time range)   aspirin EC tablet 81 mg (has no administration in time range)   cyanocobalamin (VITAMIN B-12) tablet 1,000 mcg (has no administration in time range)   levothyroxine (SYNTHROID/LEVOTHROID) tablet 175 mcg (has no administration in time range)   traZODone (DESYREL) tablet 50 mg (50 mg Oral Given 8/23/22 2311)   potassium chloride ER (KLOR-CON M) CR tablet 40 mEq (40 mEq Oral Given 8/23/22 2109)     =================================================================    HPI    Triage Note:   Pt was sent here by cardiologist after having an abnormal ECHO to get admitted to the hospital.     Triage Assessment     Row Name 08/23/22 4641       Triage Assessment (Adult)    Airway WDL WDL       Respiratory WDL    Respiratory WDL WDL       Skin Circulation/Temperature WDL    Skin Circulation/Temperature WDL WDL       Cardiac WDL    Cardiac WDL WDL       Peripheral/Neurovascular WDL    Peripheral Neurovascular WDL WDL       Cognitive/Neuro/Behavioral WDL    Cognitive/Neuro/Behavioral WDL WDL              Patient information was obtained from: Patient     Use of : N/A       Torrie Meyer is a 57 year old female who presents for evaluation of a left atrial myxoma.     Patient states a few days ago, [facility] when an EKG was preformed, showing that the patient was in atrial fibrillation with slow ventricular response. Patient was sent to cardiologist where they preformed an echocardiogram, that showed a left atrial myxoma. Cardiology told her to present to the ED to get admitted, which leads her to today.    At present, patient denies fever, chills, chest pain, shortness of breath, leg swelling, cough, or any other complaints at this time.    Of note, patient states she is a regular exerciser but as of 1 month ago, patient notices some shortness of breath and general fatigue with exertion.     REVIEW OF SYSTEMS   Except as stated in the HPI all other systems reviewed and are negative.    PAST MEDICAL HISTORY:  No past medical history on file.    PAST SURGICAL HISTORY:  No past surgical history on file.    CURRENT MEDICATIONS:      Current Facility-Administered Medications:      aspirin EC tablet 81 mg, 81 mg, Oral, Daily, Katerin Mendosa MD     cyanocobalamin (VITAMIN B-12) tablet 1,000 mcg, 1,000 mcg, Oral, Daily, Katerin Mendosa MD     levothyroxine (SYNTHROID/LEVOTHROID) tablet 175 mcg, 175 mcg, Oral, QAM AC, Katerin Mendosa MD     melatonin tablet 1 mg, 1 mg, Oral, At Bedtime PRN, Katerin Mendosa MD     ondansetron (ZOFRAN ODT) ODT tab 4 mg, 4 mg, Oral, Q6H PRN **OR** ondansetron (ZOFRAN) injection 4 mg, 4 mg, Intravenous, Q6H PRN, Katerin Mendosa MD     traZODone (DESYREL) tablet 50 mg, 50 mg, Oral, At Bedtime, Katerin Mendosa MD, 50 mg at 08/23/22 4813    Current Outpatient Medications:      alendronate (FOSAMAX) 70 MG tablet, TAKE 1 TABLET BY MOUTH 1 TIME A WEEK, Disp: , Rfl:      aspirin (ASA) 81 MG EC tablet, Take 1 tablet (81 mg) by mouth daily, Disp: 90 tablet, Rfl: 4     cyanocobalamin (VITAMIN B-12) 1000 MCG tablet, Take 1,000 mcg by mouth daily, Disp: , Rfl:      fish oil-omega-3 fatty acids 1000 MG capsule, Take 1 g by mouth daily, Disp: , Rfl:      levothyroxine  "(SYNTHROID/LEVOTHROID) 175 MCG tablet, Take 175 mcg by mouth daily, Disp: , Rfl:      multivitamin w/minerals (THERA-VIT-M) tablet, Take 1 tablet by mouth daily, Disp: , Rfl:      vitamin B-Complex, Take 1 tablet by mouth daily, Disp: , Rfl:      vitamin C (ASCORBIC ACID) 250 MG tablet, Take 250 mg by mouth daily, Disp: , Rfl:      Vitamin D3 (CHOLECALCIFEROL) 25 mcg (1000 units) tablet, Take 1 tablet by mouth daily, Disp: , Rfl:     ALLERGIES:  No Known Allergies    FAMILY HISTORY:  Family History   Problem Relation Age of Onset     Cancer Mother         stomach     Cancer Father         prostate     No Known Problems Sister      No Known Problems Daughter      No Known Problems Maternal Grandmother      No Known Problems Maternal Grandfather      No Known Problems Paternal Grandmother      No Known Problems Paternal Grandfather      No Known Problems Maternal Aunt      No Known Problems Paternal Aunt      Hereditary Breast and Ovarian Cancer Syndrome No family hx of      Breast Cancer No family hx of      Colon Cancer No family hx of      Endometrial Cancer No family hx of      Ovarian Cancer No family hx of        SOCIAL HISTORY:   Social History     Socioeconomic History     Marital status: Single   Tobacco Use     Smoking status: Never Smoker     Smokeless tobacco: Never Used       PHYSICAL EXAM    VITAL SIGNS: /79   Pulse 52   Temp 97.2  F (36.2  C) (Oral)   Resp 15   Ht 1.549 m (5' 1\")   Wt 59.9 kg (132 lb)   SpO2 97%   BMI 24.94 kg/m     GENERAL: Awake, Alert, answering questions, No acute distress, Well nourished  HEENT: Normal cephalic, Atraumatic, bilateral external ears normal, No scleral icterus, mask in place  NECK: No obvious swelling or abnormality, No stridor  PULMONARY:Normal and symmetric breath sounds, No respiratory distress, Lungs clear to auscultation bilaterally. No wheezing  CARDIOVASCULAR: Regular rate and rhythm, Distal pulses present and normal.  ABDOMINAL: Soft, " Nondistended, Nontender, No flank tenderness, No palpable masses  BACK: No tenderness.  EXTREMITIES: Moves all extremities spontaneously, warm, no edema, No major deformities  NEURO: No facial droop, normal motor function, Normal speech   PSYCH: Normal mood and affect  SKIN: No rashes on visualized skin, dry, warm     LAB:  All pertinent labs reviewed and interpreted.  Results for orders placed or performed during the hospital encounter of 08/23/22   Basic metabolic panel   Result Value Ref Range    Sodium 141 136 - 145 mmol/L    Potassium 3.5 3.5 - 5.0 mmol/L    Chloride 107 98 - 107 mmol/L    Carbon Dioxide (CO2) 26 22 - 31 mmol/L    Anion Gap 8 5 - 18 mmol/L    Urea Nitrogen 18 8 - 22 mg/dL    Creatinine 0.85 0.60 - 1.10 mg/dL    Calcium 9.5 8.5 - 10.5 mg/dL    Glucose 79 70 - 125 mg/dL    GFR Estimate 80 >60 mL/min/1.73m2   Asymptomatic COVID-19 Virus (Coronavirus) by PCR Nasopharyngeal    Specimen: Nasopharyngeal; Swab   Result Value Ref Range    SARS CoV2 PCR Negative Negative   Extra Red Top Tube   Result Value Ref Range    Hold Specimen JIC    Extra Green Top (Lithium Heparin) Tube   Result Value Ref Range    Hold Specimen JIC    Extra Purple Top Tube   Result Value Ref Range    Hold Specimen JIC    CBC with platelets and differential   Result Value Ref Range    WBC Count 5.8 4.0 - 11.0 10e3/uL    RBC Count 4.32 3.80 - 5.20 10e6/uL    Hemoglobin 14.3 11.7 - 15.7 g/dL    Hematocrit 40.9 35.0 - 47.0 %    MCV 95 78 - 100 fL    MCH 33.1 (H) 26.5 - 33.0 pg    MCHC 35.0 31.5 - 36.5 g/dL    RDW 13.3 10.0 - 15.0 %    Platelet Count 206 150 - 450 10e3/uL    % Neutrophils 50 %    % Lymphocytes 43 %    % Monocytes 5 %    % Eosinophils 1 %    % Basophils 1 %    % Immature Granulocytes 0 %    NRBCs per 100 WBC 0 <1 /100    Absolute Neutrophils 2.9 1.6 - 8.3 10e3/uL    Absolute Lymphocytes 2.5 0.8 - 5.3 10e3/uL    Absolute Monocytes 0.3 0.0 - 1.3 10e3/uL    Absolute Eosinophils 0.0 0.0 - 0.7 10e3/uL    Absolute Basophils  0.1 0.0 - 0.2 10e3/uL    Absolute Immature Granulocytes 0.0 <=0.4 10e3/uL    Absolute NRBCs 0.0 10e3/uL   Result Value Ref Range    Magnesium 1.7 (L) 1.8 - 2.6 mg/dL   Lipid panel reflex to direct LDL   Result Value Ref Range    Cholesterol 187 <=199 mg/dL    Triglycerides 66 <=149 mg/dL    Direct Measure HDL 62 >=50 mg/dL    LDL Cholesterol Calculated 112 <=129 mg/dL     EKG:    Date and time: August 23, 2022 at 1722  Rate: 37 bpm  Rhythm: Atrial flutter with variable AV block  QRS interval: 84 ms  QT/QTc: 458/359 ms  ST changes or T wave changes: No acute ST or T wave abnormality  Change from prior ECG: No prior to compare to  I have independently reviewed and interpreted this EKG.     I, Laurita Mazariegos, am serving as a scribe to document services personally performed by Dr. Ramirez based on my observation and the provider's statements to me. I, Luz Maria Ramirez MD attest that Laurita Mazariegos is acting in a scribe capacity, has observed my performance of the services and has documented them in accordance with my direction.    Luz Maria Ramirez M.D.  Emergency Medicine  [facility] EMERGENCY DEPARTMENT  [address] [address] [phone]  Dept: [phone]     Luz Maria Ramirez MD  08/24/22 0015

## 2022-08-23 NOTE — ED NOTES
Expected Patient Referral to ED  2:52 PM    Referring Clinic/Provider:  Cardiology- Echo    Reason for referral/Clinical facts:  Patient seen at [facility] radiology department today for echo, cardiology reported large mass in the left atrium which will need surgical intervention.  Will need admission for cardiology consultation and hospitalist.    Recommendations provided:  Contact inpatient service to discuss direct admission    Caller was informed that this institution does possess the capabilities and/or resources to provide for patient and should be transferred to our facility.    Discussed that if direct admit is sought and any hurdles are encountered, this ED would be happy to see the patient and evaluate.    Informed caller that recommendations provided are recommendations based only on the facts provided and that they responsible to accept or reject the advice, or to seek a formal in person consultation as needed and that this ED will see/treat patient should they arrive.      BOB GUSMAN MD  [facility] EMERGENCY DEPARTMENT  [address] [place] [address]  [phone]       Bob Gusman MD  08/23/22 8328

## 2022-08-23 NOTE — ED TRIAGE NOTES
Pt was sent here by cardiologist after having an abnormal ECHO to get admitted to the hospital.     Triage Assessment     Row Name 08/23/22 1700       Triage Assessment (Adult)    Airway WDL WDL       Respiratory WDL    Respiratory WDL WDL       Skin Circulation/Temperature WDL    Skin Circulation/Temperature WDL WDL       Cardiac WDL    Cardiac WDL WDL       Peripheral/Neurovascular WDL    Peripheral Neurovascular WDL WDL       Cognitive/Neuro/Behavioral WDL    Cognitive/Neuro/Behavioral WDL WDL

## 2022-08-24 ENCOUNTER — APPOINTMENT (OUTPATIENT)
Dept: ULTRASOUND IMAGING | Facility: HOSPITAL | Age: 57
End: 2022-08-24
Attending: PHYSICIAN ASSISTANT
Payer: COMMERCIAL

## 2022-08-24 ENCOUNTER — APPOINTMENT (OUTPATIENT)
Dept: RADIOLOGY | Facility: HOSPITAL | Age: 57
End: 2022-08-24
Attending: PHYSICIAN ASSISTANT
Payer: COMMERCIAL

## 2022-08-24 ENCOUNTER — PREP FOR PROCEDURE (OUTPATIENT)
Dept: CARDIOLOGY | Facility: CLINIC | Age: 57
End: 2022-08-24

## 2022-08-24 ENCOUNTER — APPOINTMENT (OUTPATIENT)
Dept: CARDIOLOGY | Facility: HOSPITAL | Age: 57
End: 2022-08-24
Attending: INTERNAL MEDICINE
Payer: COMMERCIAL

## 2022-08-24 DIAGNOSIS — D15.1 ATRIAL MYXOMA: Primary | ICD-10-CM

## 2022-08-24 LAB
ERYTHROCYTE [DISTWIDTH] IN BLOOD BY AUTOMATED COUNT: 13.6 % (ref 10–15)
HBA1C MFR BLD: 4.9 %
HCT VFR BLD AUTO: 45.9 % (ref 35–47)
HGB BLD-MCNC: 15.5 G/DL (ref 11.7–15.7)
MCH RBC QN AUTO: 32.6 PG (ref 26.5–33)
MCHC RBC AUTO-ENTMCNC: 33.8 G/DL (ref 31.5–36.5)
MCV RBC AUTO: 96 FL (ref 78–100)
PLATELET # BLD AUTO: 209 10E3/UL (ref 150–450)
RBC # BLD AUTO: 4.76 10E6/UL (ref 3.8–5.2)
UFH PPP CHRO-ACNC: 0.45 IU/ML
WBC # BLD AUTO: 8.3 10E3/UL (ref 4–11)

## 2022-08-24 PROCEDURE — 250N000011 HC RX IP 250 OP 636: Performed by: INTERNAL MEDICINE

## 2022-08-24 PROCEDURE — 86901 BLOOD TYPING SEROLOGIC RH(D): CPT | Performed by: INTERNAL MEDICINE

## 2022-08-24 PROCEDURE — 93312 ECHO TRANSESOPHAGEAL: CPT | Mod: 26 | Performed by: INTERNAL MEDICINE

## 2022-08-24 PROCEDURE — 250N000013 HC RX MED GY IP 250 OP 250 PS 637: Performed by: INTERNAL MEDICINE

## 2022-08-24 PROCEDURE — 99152 MOD SED SAME PHYS/QHP 5/>YRS: CPT | Performed by: INTERNAL MEDICINE

## 2022-08-24 PROCEDURE — 93880 EXTRACRANIAL BILAT STUDY: CPT

## 2022-08-24 PROCEDURE — 210N000001 HC R&B IMCU HEART CARE

## 2022-08-24 PROCEDURE — 250N000009 HC RX 250: Performed by: INTERNAL MEDICINE

## 2022-08-24 PROCEDURE — 71046 X-RAY EXAM CHEST 2 VIEWS: CPT

## 2022-08-24 PROCEDURE — 85027 COMPLETE CBC AUTOMATED: CPT | Performed by: INTERNAL MEDICINE

## 2022-08-24 PROCEDURE — 83036 HEMOGLOBIN GLYCOSYLATED A1C: CPT | Performed by: INTERNAL MEDICINE

## 2022-08-24 PROCEDURE — 258N000003 HC RX IP 258 OP 636: Performed by: INTERNAL MEDICINE

## 2022-08-24 PROCEDURE — 36415 COLL VENOUS BLD VENIPUNCTURE: CPT | Performed by: INTERNAL MEDICINE

## 2022-08-24 PROCEDURE — 93320 DOPPLER ECHO COMPLETE: CPT | Mod: 26 | Performed by: INTERNAL MEDICINE

## 2022-08-24 PROCEDURE — 93325 DOPPLER ECHO COLOR FLOW MAPG: CPT

## 2022-08-24 PROCEDURE — 99232 SBSQ HOSP IP/OBS MODERATE 35: CPT | Performed by: INTERNAL MEDICINE

## 2022-08-24 PROCEDURE — 85520 HEPARIN ASSAY: CPT | Performed by: INTERNAL MEDICINE

## 2022-08-24 PROCEDURE — 99223 1ST HOSP IP/OBS HIGH 75: CPT | Mod: 25 | Performed by: INTERNAL MEDICINE

## 2022-08-24 PROCEDURE — 99221 1ST HOSP IP/OBS SF/LOW 40: CPT | Performed by: THORACIC SURGERY (CARDIOTHORACIC VASCULAR SURGERY)

## 2022-08-24 PROCEDURE — 93325 DOPPLER ECHO COLOR FLOW MAPG: CPT | Mod: 26 | Performed by: INTERNAL MEDICINE

## 2022-08-24 RX ORDER — FENTANYL CITRATE 50 UG/ML
INJECTION, SOLUTION INTRAMUSCULAR; INTRAVENOUS
Status: COMPLETED | OUTPATIENT
Start: 2022-08-24 | End: 2022-08-24

## 2022-08-24 RX ORDER — MAGNESIUM OXIDE 400 MG/1
400 TABLET ORAL 2 TIMES DAILY
Status: DISCONTINUED | OUTPATIENT
Start: 2022-08-24 | End: 2022-08-29

## 2022-08-24 RX ORDER — HEPARIN SODIUM 10000 [USP'U]/100ML
0-5000 INJECTION, SOLUTION INTRAVENOUS CONTINUOUS
Status: DISCONTINUED | OUTPATIENT
Start: 2022-08-24 | End: 2022-08-25

## 2022-08-24 RX ORDER — LIDOCAINE HYDROCHLORIDE 20 MG/ML
SOLUTION OROPHARYNGEAL
Status: COMPLETED | OUTPATIENT
Start: 2022-08-24 | End: 2022-08-24

## 2022-08-24 RX ORDER — SODIUM CHLORIDE 9 MG/ML
INJECTION, SOLUTION INTRAVENOUS CONTINUOUS
Status: CANCELLED | OUTPATIENT
Start: 2022-08-24

## 2022-08-24 RX ORDER — SODIUM CHLORIDE 9 MG/ML
INJECTION, SOLUTION INTRAVENOUS CONTINUOUS PRN
Status: COMPLETED | OUTPATIENT
Start: 2022-08-24 | End: 2022-08-24

## 2022-08-24 RX ADMIN — LIDOCAINE HYDROCHLORIDE 15 ML: 20 SOLUTION ORAL; TOPICAL at 11:07

## 2022-08-24 RX ADMIN — MIDAZOLAM 1 MG: 1 INJECTION INTRAMUSCULAR; INTRAVENOUS at 11:07

## 2022-08-24 RX ADMIN — SODIUM CHLORIDE 100 ML/HR: 9 INJECTION, SOLUTION INTRAVENOUS at 10:45

## 2022-08-24 RX ADMIN — Medication 81 MG: at 08:09

## 2022-08-24 RX ADMIN — HEPARIN SODIUM 700 UNITS/HR: 10000 INJECTION, SOLUTION INTRAVENOUS at 14:29

## 2022-08-24 RX ADMIN — Medication 1000 MCG: at 08:09

## 2022-08-24 RX ADMIN — ONDANSETRON 4 MG: 2 INJECTION INTRAMUSCULAR; INTRAVENOUS at 11:51

## 2022-08-24 RX ADMIN — BENZOCAINE 9 SPRAY: 220 SPRAY, METERED PERIODONTAL at 11:08

## 2022-08-24 RX ADMIN — Medication 400 MG: at 08:09

## 2022-08-24 RX ADMIN — FENTANYL CITRATE 100 MCG: 50 INJECTION, SOLUTION INTRAMUSCULAR; INTRAVENOUS at 11:09

## 2022-08-24 RX ADMIN — MIDAZOLAM 0.5 MG: 1 INJECTION INTRAMUSCULAR; INTRAVENOUS at 11:12

## 2022-08-24 RX ADMIN — LEVOTHYROXINE SODIUM 175 MCG: 0.03 TABLET ORAL at 08:09

## 2022-08-24 RX ADMIN — TRAZODONE HYDROCHLORIDE 50 MG: 50 TABLET ORAL at 23:02

## 2022-08-24 RX ADMIN — Medication 400 MG: at 20:24

## 2022-08-24 ASSESSMENT — ACTIVITIES OF DAILY LIVING (ADL)
FALL_HISTORY_WITHIN_LAST_SIX_MONTHS: NO
ADLS_ACUITY_SCORE: 35
DRESSING/BATHING_DIFFICULTY: NO
ADLS_ACUITY_SCORE: 35
CHANGE_IN_FUNCTIONAL_STATUS_SINCE_ONSET_OF_CURRENT_ILLNESS/INJURY: NO
CONCENTRATING,_REMEMBERING_OR_MAKING_DECISIONS_DIFFICULTY: NO
WEAR_GLASSES_OR_BLIND: NO
DIFFICULTY_COMMUNICATING: NO
ADLS_ACUITY_SCORE: 35
ADLS_ACUITY_SCORE: 35
DIFFICULTY_EATING/SWALLOWING: NO
WALKING_OR_CLIMBING_STAIRS_DIFFICULTY: NO
ADLS_ACUITY_SCORE: 35
ADLS_ACUITY_SCORE: 31
TOILETING_ISSUES: NO
DOING_ERRANDS_INDEPENDENTLY_DIFFICULTY: NO
DEPENDENT_IADLS:: INDEPENDENT
HEARING_DIFFICULTY_OR_DEAF: NO
ADLS_ACUITY_SCORE: 18

## 2022-08-24 NOTE — PHARMACY-ADMISSION MEDICATION HISTORY
Pharmacy Note - Admission Medication History    Pertinent Provider Information: none     ______________________________________________________________________    Prior To Admission (PTA) med list completed and updated in EMR.       PTA Med List   Medication Sig Last Dose     alendronate (FOSAMAX) 70 MG tablet TAKE 1 TABLET BY MOUTH 1 TIME A WEEK 8/20/2022     aspirin (ASA) 81 MG EC tablet Take 1 tablet (81 mg) by mouth daily 8/23/2022 at am     cyanocobalamin (VITAMIN B-12) 1000 MCG tablet Take 1,000 mcg by mouth daily 8/23/2022 at am     fish oil-omega-3 fatty acids 1000 MG capsule Take 1 g by mouth daily 8/23/2022 at am     levothyroxine (SYNTHROID/LEVOTHROID) 175 MCG tablet Take 175 mcg by mouth daily 8/23/2022 at am     multivitamin w/minerals (THERA-VIT-M) tablet Take 1 tablet by mouth daily 8/23/2022 at am     vitamin B-Complex Take 1 tablet by mouth daily 8/23/2022 at am     vitamin C (ASCORBIC ACID) 250 MG tablet Take 250 mg by mouth daily 8/23/2022 at am     Vitamin D3 (CHOLECALCIFEROL) 25 mcg (1000 units) tablet Take 1 tablet by mouth daily 8/23/2022 at am       Information source(s): Patient and CareEverywhere/[facility]  Method of interview communication: in-person    Summary of Changes to PTA Med List  New: vitamins  Discontinued: none  Changed: levothyroxine    Patient was asked about OTC/herbal products specifically.  PTA med list reflects this.    In the past week, patient estimated taking medication this percent of the time:  greater than 90%.    Allergies were reviewed, assessed, and updated with the patient.      Patient does not use any multi-dose medications prior to admission.    The information provided in this note is only as accurate as the sources available at the time of the update(s).    Thank you for the opportunity to participate in the care of this patient.    Cass Salcedo RPH  8/23/2022 7:12 PM

## 2022-08-24 NOTE — SEDATION DOCUMENTATION
MARII complete. Patient tolerated procedure well. NPO until 1230, no hot foods/liquids until 1630. See MAR for sedation doses. Full report to follow.

## 2022-08-24 NOTE — UTILIZATION REVIEW
Admission Status; Secondary Review Determination   Under the authority of the Utilization Management Committee, the utilization review process indicated a secondary review on Torrie Meyer. The review outcome is based on review of the medical records, discussions with staff, and applying clinical experience noted on the date of the review.   (x) Inpatient Status Appropriate - This patient's medical care is consistent with medical management for inpatient care and reasonable inpatient medical practice.     RATIONALE FOR DETERMINATION   57 yr old female with hypothyroidism presented after 4cm atrial mass noted on outpatient ECHO.  Recent diagnosis Afib with slow ventricular response.  Rates still 30-50s.  No rate controlling medications.  MARII performed and CV surgery looking at removing mass.  Evaluation in progress for risk stratification.  Anticipating doing this hospital stay if allows.      At the time of admission with the information available to the attending physician more than 2 nights Hospital complex care was anticipated, based on patient risk of adverse outcome if treated as outpatient and complex care required. Inpatient admission is appropriate based on the Medicare guidelines.   The information on this document is developed by the utilization review team in order for the business office to ensure compliance. This only denotes the appropriateness of proper admission status and does not reflect the quality of care rendered.   The definitions of Inpatient Status and Observation Status used in making the determination above are those provided in the CMS Coverage Manual, Chapter 1 and Chapter 6, section 70.4.   Sincerely,   Khadra Tellez MD  Utilization Review  Physician Advisor  [facility]

## 2022-08-24 NOTE — PLAN OF CARE
"  Problem: Plan of Care - These are the overarching goals to be used throughout the patient stay.    Goal: Plan of Care Review/Shift Note  Description: The Plan of Care Review/Shift note should be completed every shift.  The Outcome Evaluation is a brief statement about your assessment that the patient is improving, declining, or no change.  This information will be displayed automatically on your shift note.  Outcome: Ongoing, Progressing  Flowsheets (Taken 8/24/2022 1114)  Plan of Care Reviewed With: patient  Goal: Patient-Specific Goal (Individualized)  Description: You can add care plan individualizations to a care plan. Examples of Individualization might be:  \"Parent requests to be called daily at 9am for status\", \"I have a hard time hearing out of my right ear\", or \"Do not touch me to wake me up as it startles me\".  Outcome: Ongoing, Progressing  Goal: Absence of Hospital-Acquired Illness or Injury  Outcome: Ongoing, Progressing  Intervention: Identify and Manage Fall Risk  Recent Flowsheet Documentation  Taken 8/24/2022 0800 by Marianne Chahal RN  Safety Promotion/Fall Prevention: activity supervised  Intervention: Prevent Skin Injury  Recent Flowsheet Documentation  Taken 8/24/2022 0800 by Marianne Chahal RN  Body Position: position changed independently  Intervention: Prevent and Manage VTE (Venous Thromboembolism) Risk  Recent Flowsheet Documentation  Taken 8/24/2022 0800 by Marianne Chahal RN  Activity Management: activity adjusted per tolerance  Goal: Optimal Comfort and Wellbeing  Outcome: Ongoing, Progressing  Goal: Readiness for Transition of Care  Outcome: Ongoing, Progressing     Problem: Dysrhythmia  Goal: Normalized Cardiac Rhythm  Outcome: Ongoing, Progressing   Goal Outcome Evaluation:    Plan of Care Reviewed With: patient        Pt is alert and oriented x4. Pt is med compliant. Pt denies pain. Pt's v/s is stable. Pt is independent in the room. Pt's Hr= 39-60 this morning, Md is aware. Pt " went for MARII this morning. Continue to monitor pt.

## 2022-08-24 NOTE — PLAN OF CARE
Problem: Dysrhythmia  Goal: Normalized Cardiac Rhythm  Outcome: Ongoing, Not Progressing   Goal Outcome Evaluation:  Torrie continues in afib with low heart rates, 30s-60.  She denies chest pain.  Cardiology & cardiothoracic surgeon will see today.

## 2022-08-24 NOTE — PRE-PROCEDURE
GENERAL PRE-PROCEDURE:   Procedure:  Transesophageal echocardiogram  Date/Time:  8/24/2022 11:00 AM    Written consent obtained?: Yes    Risks and benefits: Risks, benefits and alternatives were discussed    Consent given by:  Patient  Patient states understanding of procedure being performed: Yes    Patient's understanding of procedure matches consent: Yes    Procedure consent matches procedure scheduled: Yes    Expected level of sedation:  Moderate  Appropriately NPO:  Yes  Mallampati  :  Grade 1- soft palate, uvula, tonsillar pillars, and posterior pharyngeal wall visible  Lungs:  Lungs clear with good breath sounds bilaterally  Heart:  Normal heart sounds and rate  History & Physical reviewed:  History and physical reviewed and no updates needed  Statement of review:  I have reviewed the lab findings, diagnostic data, medications, and the plan for sedation

## 2022-08-24 NOTE — CONSULTS
HEART CARE CONSULTATON NOTE        Assessment/Recommendations   Assessment:  1.  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  Discussed with patient and CV surgery and potential plan for surgery on Monday.  2.  Anticoagulation: We will start on heparin drip  3.  Atrial fibrillation: Atrial fibrillation with slow ventricular response.  Can reassess postoperatively to determine further management of atrial fibrillation.  4.  Cardiomyopathy: Left ventricular ejection fraction decreased to 40-45%.  Most likely nonischemic.  We will be assessing coronary anatomy preoperatively.    Plan:  1.  CV surgery consulted.  Discussed with their team and potential plan for surgery Monday unless they are able to do it sooner.  2.  MARII done today demonstrating large mass attached to the atrial septum within the left atrium extending through the mitral annular valve plane and diastole consistent with left atrial myxoma.  3.  Coronary angiogram tomorrow  4.  Heparin drip for anticoagulation  5.  Further management of atrial fibrillation will be determined postoperatively       History of Present Illness/Subjective    HPI: Torrie Meyer is a 57 year old female who was admitted to the hospital after an abnormal echocardiogram demonstrating a large left atrial myxoma.  She was seen in cardiology clinic recently last week for newly diagnosed atrial fibrillation.  She also carries a history of hypothyroidism.  Her atrial fibrillation was picked up incidentally during a colonoscopy when she was noted to have an irregular heartbeat.  After being seen in cardiology clinic she was sent for an echocardiogram which demonstrated a large left atrial myxoma 4 x 4 x 4 cm EF 40-45% with moderate to severely dilated left atrium.  She reports that she has noticed that she has been more fatigued lately.  She is a runner and  "usually has great energy.  Recently she has noted decreased exercise tolerance.  No palpitations, lightheadedness or syncopal episodes.  No chest pain.    Twelve-lead EKG personally reviewed and demonstrates atrial fibrillation with marked slow ventricular response at 37 bpm     Physical Examination  Review of Systems   VITALS: /75   Pulse 71   Temp 98.8  F (37.1  C) (Oral)   Resp 20   Ht 1.549 m (5' 1\")   Wt 59.9 kg (132 lb)   SpO2 98%   BMI 24.94 kg/m    BMI: Body mass index is 24.94 kg/m .  Wt Readings from Last 3 Encounters:   08/23/22 59.9 kg (132 lb)   08/18/22 62.1 kg (137 lb)       Intake/Output Summary (Last 24 hours) at 8/24/2022 1416  Last data filed at 8/24/2022 1144  Gross per 24 hour   Intake 250 ml   Output --   Net 250 ml     General Appearance:   no distress, normal body habitus   ENT/Mouth: membranes moist, no oral lesions or bleeding gums.      EYES:  no scleral icterus, normal conjunctivae   Neck: no carotid bruits or thyromegaly   Chest/Lungs:   lungs are clear to auscultation   Cardiovascular:   irregular. Normal first and second heart sounds with no murmurs  no edema bilaterally    Abdomen:  no organomegaly, masses, bruits, or tenderness; bowel sounds are present   Extremities: no cyanosis or clubbing   Skin: no xanthelasma, warm.    Neurologic: normal  bilateral, no tremors     Psychiatric: alert and oriented x3, calm     Review Of Systems  Skin: negative  Eyes: negative  Ears/Nose/Throat: negative  Respiratory: No shortness of breath, dyspnea on exertion, cough, or hemoptysis  Cardiovascular: negative  Gastrointestinal: negative  Genitourinary: negative  Musculoskeletal: negative  Neurologic: negative  Psychiatric: negative  Hematologic/Lymphatic/Immunologic: negative  Endocrine: negative          Lab Results    Chemistry/lipid CBC Cardiac Enzymes/BNP/TSH/INR   Recent Labs   Lab Test 08/23/22  1802   CHOL 187   HDL 62      TRIG 66     Recent Labs   Lab Test " 08/23/22  1802 07/23/20  1038    83     Recent Labs   Lab Test 08/23/22  1802      POTASSIUM 3.5   CHLORIDE 107   CO2 26   GLC 79   BUN 18   CR 0.85   GFRESTIMATED 80   YEVGENIY 9.5     Recent Labs   Lab Test 08/23/22  1802 08/17/22  1041 04/12/22  1642   CR 0.85 0.91 0.80     No results for input(s): A1C in the last 45370 hours.       Recent Labs   Lab Test 08/23/22  1802   WBC 5.8   HGB 14.3   HCT 40.9   MCV 95        Recent Labs   Lab Test 08/23/22  1802   HGB 14.3    No results for input(s): TROPONINI in the last 93727 hours.  No results for input(s): BNP, NTBNPI, NTBNP in the last 40951 hours.  Recent Labs   Lab Test 08/17/22  1041   TSH 78.80*     No results for input(s): INR in the last 47093 hours.     Medical History  Surgical History Family History Social History   Atrial fibrillation No past surgical history on file.  Family History   Problem Relation Age of Onset     Cancer Mother         stomach     Cancer Father         prostate     No Known Problems Sister      No Known Problems Daughter      No Known Problems Maternal Grandmother      No Known Problems Maternal Grandfather      No Known Problems Paternal Grandmother      No Known Problems Paternal Grandfather      No Known Problems Maternal Aunt      No Known Problems Paternal Aunt      Hereditary Breast and Ovarian Cancer Syndrome No family hx of      Breast Cancer No family hx of      Colon Cancer No family hx of      Endometrial Cancer No family hx of      Ovarian Cancer No family hx of         Social History     Socioeconomic History     Marital status: Single     Spouse name: Not on file     Number of children: Not on file     Years of education: Not on file     Highest education level: Not on file   Occupational History     Not on file   Tobacco Use     Smoking status: Never Smoker     Smokeless tobacco: Never Used   Substance and Sexual Activity     Alcohol use: Not on file     Drug use: Not on file     Sexual activity: Not on  file   Other Topics Concern     Not on file   Social History Narrative     Not on file     Social Determinants of Health     Financial Resource Strain: Not on file   Food Insecurity: Not on file   Transportation Needs: Not on file   Physical Activity: Not on file   Stress: Not on file   Social Connections: Not on file   Intimate Partner Violence: Not on file   Housing Stability: Not on file         Medications  Allergies   Current Outpatient Medications   Medication Sig Dispense Refill     alendronate (FOSAMAX) 70 MG tablet TAKE 1 TABLET BY MOUTH 1 TIME A WEEK       aspirin (ASA) 81 MG EC tablet Take 1 tablet (81 mg) by mouth daily 90 tablet 4     cyanocobalamin (VITAMIN B-12) 1000 MCG tablet Take 1,000 mcg by mouth daily       fish oil-omega-3 fatty acids 1000 MG capsule Take 1 g by mouth daily       levothyroxine (SYNTHROID/LEVOTHROID) 175 MCG tablet Take 175 mcg by mouth daily       multivitamin w/minerals (THERA-VIT-M) tablet Take 1 tablet by mouth daily       vitamin B-Complex Take 1 tablet by mouth daily       vitamin C (ASCORBIC ACID) 250 MG tablet Take 250 mg by mouth daily       Vitamin D3 (CHOLECALCIFEROL) 25 mcg (1000 units) tablet Take 1 tablet by mouth daily        No Known Allergies      Leesa Ibanez MD

## 2022-08-24 NOTE — PROGRESS NOTES
[facility]    Medicine Progress Note - Hospitalist Service    Date of Admission:  8/23/2022    Assessment & Plan          Torrie Meyer is a 57 year old female PMH of hypothyroidism, A. fib with slow ventricular response, admitted on 8/23/2022.     Assessment:  Left radial mass, likely myxoma, 3x4x4.5 cm mobile inhomogeneous left atrial attached to atrial septum mass, protruding through mitral valvular plane in the left ventricle during diastole.  Associated thrombus could not be excluded on echo from 8/18/2022.  Cardiomyopathy, likely nonischemic, with decreased LVEF at 40 to 45%, on echo 8/23/2022.  MARII on 8/24 as above.  Atrial fibrillation with slow ventricular response in 40-50s.  Patient asymptomatic/fibrillation was found accidentally during pre-colonoscopy evaluation.  First-time seen in the clinic by cardiology on 8/18/2022, Dr. Sawant.  [id number] score is 1 due to gender, thus very low risk of stroke.  Baby aspirin recommended by cardiology.  Hypothyroidism, on replacement with levothyroxine.  Most recent TSH 78.8 on 8/17/2022; normal free T4.  Low normal potassium was 3.5.  Magnesium at 1.7.  Cholesterol 187, HDL 62, .    Plan:  Continue telemetry monitoring  Anticoagulation with heparin drip started 8/24, to decrease cardioembolic events, as recommended by cardiology.  Preoperative coronary angiogram scheduled for 8/25.  Patient was consulted by cardiothoracic surgeon, with plans for surgery likely on Monday, or sooner if able to.  Aspirin discontinued per cardiology.  Continue home dose of levothyroxine.  Patient of Dr. Hair.  Monitor and replace electrolytes.  Am lipids.     Diet: NPO for Medical/Clinical Reasons Except for: Other; Specify: NPO for 1 hour after MARII then Advance diet as tolerated to Adult Basic Diet    DVT Prophylaxis: Heparin drip  Arreaga Catheter: Not present  Central Lines: None  Cardiac Monitoring: ACTIVE order. Indication: QTc prolonging medication  (48 hours)  Code Status: Full Code      Disposition Plan      Anticipate prolonged hospitalization, due to cardiothoracic surgery expected on 8/29/2023.     The patient's care was discussed with the Bedside Nurse, Patient and Dr. Ibanez. Consultant.    Katerin Mendosa MD  Hospitalist Service  [facility]  Securely message with the Vocera Web Console (learn more here)  Text page via KeyMe Paging/Directory     Clinically Significant Risk Factors Present on Admission                      _____________________________________________________________________    Interval History   Think it overnight.  Remains bradycardic with heart rate in low 50s.  Broad-spectrum recognizes chest pressure, at rest and exertional, with associated dyspnea.  No abdominal pain, nausea, vomiting, cough, dysuria.    Data reviewed today: I reviewed all medications, new labs and imaging results over the last 24 hours. I personally reviewed    Physical Exam   Vital Signs: Temp: 98.8  F (37.1  C) Temp src: Oral BP: 126/75 Pulse: 71   Resp: 20 SpO2: 98 % O2 Device: None (Room air) Oxygen Delivery: 2 LPM  Weight: 132 lbs 0 oz  General: Alert and oriented x 3. Not in obvious distress.  HEENT: NC, AT. Neck- supple, No JVP elevation, lymphadenopathy or thyromegaly. Trachea-central.  Chest: Clear to auscultation bilaterally.  Heart: distant S1S2 irreg irregular. No M/R/G.  Abdomen: Soft. NT, ND. No organomegaly. Bowel sounds- active.  Back: No spine tenderness. No CVA tenderness.  Extremities: No leg swelling. Peripheral pulses 2+ bilaterally.  Neuro: Cranial nerves 1-12 grossly normal. No focal neurological deficit    Data   Recent Labs   Lab 08/24/22  1437 08/23/22  1802   WBC 8.3 5.8   HGB 15.5 14.3   MCV 96 95    206   NA  --  141   POTASSIUM  --  3.5   CHLORIDE  --  107   CO2  --  26   BUN  --  18   CR  --  0.85   ANIONGAP  --  8   YEVGENIY  --  9.5   GLC  --  79     Recent Results (from the past 24 hour(s))  "  Echocardiogram MARII    Narrative    [id number]  [facility]  [id number]  [id number] [address]     Name: LUISANA BYERS  MRN: [id number]  : 1965  Study Date: 2022 10:54 AM  Age: 57 yrs  Gender: Female  Patient Location: [facility]  Reason For Study: Atrial Mass  Ordering Physician: TIM LOPEZ  Performed By: /NOEMÍ     BSA: 1.6 m2  Height: 61 in  Weight: 132 lb  HR: 65  ______________________________________________________________________________  Procedure  Complete MARII Adult. 3D image acquisition, reconstruction, and real-time  interpretation was performed.  ______________________________________________________________________________  Interpretation Summary     TRANSESOPHAGEAL ECHOCARDIOGRAM     1. The left ventricle is normal in size. Left ventricular systolic performance  is mild to moderately reduced. The ejection fraction is estimated to be 40-  45%.  2. There is mild to moderate global reduction in left ventricular systolic  performance.  3. There is a 3 x 4 x 4.5 cm mass in the left atrium attached to the atrial  septum. The echocardiographic features and anatomic location are paradigmatic  of an atrial myxoma. The mass moves forward in diastole abutting the mitral  valve leaflets and extends through the mitral annular valve plane.  4. Mild right ventricular enlargement with low normal right ventricular  systolic performance.  5. There is moderate left atrial enlargement. There is mild to moderate right  atrial enlargement.  6. No thrombus is detected within the left atrial appendage.  7. Color Doppler interrogation the atrial septum suggest the presence of a  small patent foramen ovale (PFO). No right to left shunting, however, is  detected with echo contrast (\"bubble\") study.  ______________________________________________________________________________  Left ventricle:  The left ventricle is normal in size. Left ventricular " systolic performance is  mild to moderately reduced. The ejection fraction is estimated to be 40-45%.  There is mild to moderate global reduction in left ventricular systolic  performance.     Right ventricle:  Mild right ventricular enlargement with low normal right ventricular systolic  performance.     Left atrium:  There is moderate left atrial enlargement.     Right atrium:  There is mild to moderate right atrial enlargement.     Aortic valve:  The aortic valve is comprised of three cusps. No significant aortic stenosis  or aortic insufficiency is detected on this study.     Mitral valve:  There is mild mitral annular calcification. There is mild mitral  insufficiency.     Tricuspid valve:  The tricuspid valve is grossly morphologically normal. There is trace-mild  tricuspid insufficiency.     Pulmonic valve:  The pulmonic valve is grossly morphologically normal.     Thoracic aorta:  The aortic root and proximal ascending aorta are of normal dimension.     Pericardium:  There is no significant pericardial effusion.  ______________________________________________________________________________  The study was performed using a multiplane adult transducer. 2-D, colorflow  Doppler, and spectral Doppler analysis was performed. Informed consent was  obtained prior to the procedure. The patient was assessed upon my arrival to  the procedure room. The patient was felt to be an appropriate candidate for  the procedure and planned sedation. Sedation: fentanyl 100 mcg & versed 1.5  mg  iv. Topical anesthesia was performed with viscous lidocaine and benzocaine  spray. The transducer was introduced without difficulty. Heart rate,  respiratory rate, oxygen saturations, blood pressure, and response to care  were monitored throughout the procedure with nursing assistance. There were no  complications of the procedure. Total sedation time: 26 minutes of continuous  bedside 1:1  monitoring.  ______________________________________________________________________________  MARII  Patient was sedated using Versed 1.5 mg. The heart rate, respiratory rate,  oxygen saturations, blood pressure, and response to care were monitored  throughout the procedure with the assistance of the nurse. Patient was sedated  using Fentanyl 100 mcg. 15 ml viscous Lidocaine, 9 sprays of Benzocaine. I  determined this patient to be an appropriate candidate for the planned  sedation and procedure and have reassessed the patient immediately prior to  sedation and procedure. Total sedation time: 26 minutes of continuous bedside  1:1 monitoring.  ______________________________________________________________________________  Doppler Measurements & Calculations  MV max P.7 mmHg  MV mean P.5 mmHg  MV V2 VTI: 27.5 cm  TR max melvin: 229.0 cm/sec  TR max P.0 mmHg     ______________________________________________________________________________  Report approved by: Luis Jarrett 2022 01:45 PM           Medications     heparin 700 Units/hr (22 1429)       cyanocobalamin  1,000 mcg Oral Daily     levothyroxine  175 mcg Oral QAM AC     magnesium oxide  400 mg Oral BID     traZODone  50 mg Oral At Bedtime

## 2022-08-24 NOTE — PLAN OF CARE
"  Problem: Plan of Care - These are the overarching goals to be used throughout the patient stay.    Goal: Plan of Care Review/Shift Note  Description: The Plan of Care Review/Shift note should be completed every shift.  The Outcome Evaluation is a brief statement about your assessment that the patient is improving, declining, or no change.  This information will be displayed automatically on your shift note.  8/24/2022 1857 by Marianne Chahal RN  Outcome: Ongoing, Progressing  Flowsheets (Taken 8/24/2022 1857)  Plan of Care Reviewed With: patient  8/24/2022 1114 by Marianne Chahal RN  Outcome: Ongoing, Progressing  Flowsheets (Taken 8/24/2022 1114)  Plan of Care Reviewed With: patient  Goal: Patient-Specific Goal (Individualized)  Description: You can add care plan individualizations to a care plan. Examples of Individualization might be:  \"Parent requests to be called daily at 9am for status\", \"I have a hard time hearing out of my right ear\", or \"Do not touch me to wake me up as it startles me\".  8/24/2022 1857 by Marianne Chahal RN  Outcome: Ongoing, Progressing  8/24/2022 1114 by Marianne Chahal RN  Outcome: Ongoing, Progressing  Goal: Absence of Hospital-Acquired Illness or Injury  8/24/2022 1857 by Marianne Chahal RN  Outcome: Ongoing, Progressing  8/24/2022 1114 by Marianne Chahal RN  Outcome: Ongoing, Progressing  Intervention: Identify and Manage Fall Risk  Recent Flowsheet Documentation  Taken 8/24/2022 1600 by Marianne Chahal RN  Safety Promotion/Fall Prevention: activity supervised  Taken 8/24/2022 1206 by Marianne Chahal RN  Safety Promotion/Fall Prevention: activity supervised  Taken 8/24/2022 0800 by Marianne Chahal RN  Safety Promotion/Fall Prevention: activity supervised  Intervention: Prevent Skin Injury  Recent Flowsheet Documentation  Taken 8/24/2022 1600 by Marianne Chahal RN  Body Position: position changed independently  Taken 8/24/2022 1206 by Marianne Chahal RN  Body Position: " position changed independently  Taken 8/24/2022 0800 by Marianne Chahal RN  Body Position: position changed independently  Intervention: Prevent and Manage VTE (Venous Thromboembolism) Risk  Recent Flowsheet Documentation  Taken 8/24/2022 1600 by Marianne Chahal RN  Activity Management: activity adjusted per tolerance  Taken 8/24/2022 1206 by Marianne Chahal RN  Activity Management: activity adjusted per tolerance  Taken 8/24/2022 0800 by Marianne Chahal RN  Activity Management: activity adjusted per tolerance  Goal: Optimal Comfort and Wellbeing  8/24/2022 1857 by Marianne Chahal RN  Outcome: Ongoing, Progressing  8/24/2022 1114 by Marianne Chahal RN  Outcome: Ongoing, Progressing  Goal: Readiness for Transition of Care  8/24/2022 1857 by Marianne Chahal RN  Outcome: Ongoing, Progressing  8/24/2022 1114 by Marianne Chahal RN  Outcome: Ongoing, Progressing     Problem: Dysrhythmia  Goal: Normalized Cardiac Rhythm  8/24/2022 1857 by Marianne Chahal RN  Outcome: Ongoing, Progressing  8/24/2022 1114 by Marianne Chahal RN  Outcome: Ongoing, Progressing   Goal Outcome Evaluation:    Plan of Care Reviewed With: patient           Pt is alert and oriented x4. Pt denies pain.pt's v/s is stable. Pt is independent in the room. No complain. Continue to monitor pt.

## 2022-08-24 NOTE — CONSULTS
"Care Management Initial Consult    General Information  Assessment completed with: PatientTorrie  Type of CM/SW Visit: Initial Assessment    Primary Care Provider verified and updated as needed: Yes   Readmission within the last 30 days: no previous admission in last 30 days      Reason for Consult: discharge planning  Advance Care Planning: Advance Care Planning Reviewed: no concerns identified          Communication Assessment  Patient's communication style: spoken language (English or Bilingual)             Cognitive  Cognitive/Neuro/Behavioral: WDL                      Living Environment:   People in home: significant other, child(ninfa), dependent  \"lizz Brownlee, son age 17\"  Current living Arrangements: house      Able to return to prior arrangements: yes       Family/Social Support:  Care provided by: self  Provides care for: child(ninfa) (son age 17)  Marital Status: Lives with Significant Other  Significant Other (roseann Cifuentes  Description of Support System: Supportive, Involved    Support Assessment: Adequate family and caregiver support, Adequate social supports, Patient communicates needs well met    Current Resources:   Patient receiving home care services: No     Community Resources: None  Equipment currently used at home: none  Supplies currently used at home: Other (\"reading glasses\")    Employment/Financial:  Employment Status: employed part-time (\"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, but they are fine with however long I need to be off work\".)     Employment/ Comments: \"no  benefits\"  Financial Concerns:     Referral to Financial Worker: No       Lifestyle & Psychosocial Needs:  Social Determinants of Health     Tobacco Use: Low Risk      Smoking Tobacco Use: Never Smoker     Smokeless Tobacco Use: Never Used   Alcohol Use: Not on file   Financial Resource Strain: Not on file   Food Insecurity: Not on file   Transportation " Needs: Not on file   Physical Activity: Not on file   Stress: Not on file   Social Connections: Not on file   Intimate Partner Violence: Not on file   Depression: Not on file   Housing Stability: Not on file       Functional Status:  Prior to admission patient needed assistance:   Dependent ADLs:: Independent, Ambulation-no assistive device  Dependent IADLs:: Independent  Assesssment of Functional Status: At functional baseline    Mental Health Status:          Chemical Dependency Status:                Values/Beliefs:  Spiritual, Cultural Beliefs, Religion Practices, Values that affect care:                 Additional Information:  Torrie lives in a house with her fiance Esau and her 17 year old son.     She is independent with ADLs at baseline and drives and works part time.    Likely no discharge needs at this time.    Family to transport at discharge.    Mercy Singh RN

## 2022-08-24 NOTE — H&P
[facility]    History and Physical - Hospitalist Service       Date of Admission:  8/23/2022    Assessment & Plan      Torrie Meyer is a 57 year old female admitted on 8/23/2022.     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  Decreased LVEF at 40 to 45%, on echo 8/23/2022  Atrial fibrillation with slow ventricular response in 40-50s.  Patient asymptomatic/fibrillation was found accidentally during pre-colonoscopy evaluation.  Last seen by cardiology on 8/18/2022, Dr. Sawant.  [id number] score is 1 due to gender, thus very low risk of stroke.  Baby aspirin recommended by cardiology.  Hypothyroidism, on replacement with levothyroxine.  Most recent TSH 78.8 on 8/17/2022; normal free T4.  Low normal potassium was 3.5.    Plan:  Admit to telemetry bed.  Bedrest with bathroom privileges.  Cardiothoracic surgery consult.  Continue home dose of aspirin.  Continue home dose of levothyroxine.  Patient of Dr. Hair.  Check magnesium level.  One-time dose 40 mEq of potassium chloride.  Am lipids.       Diet:  Regular  DVT Prophylaxis: Pneumatic Compression Devices  Arreaga Catheter: Not present  Central Lines: None  Cardiac Monitoring: None  Code Status:   full    Clinically Significant Risk Factors Present on Admission                          Disposition Plan   Anticipate hospitalization for 1-2 days.  Timing of discharge depends on cardiothoracic surgery recommendations.    The patient's care was discussed with the Bedside Nurse, Patient and Patient's Family.    Katerin Mendosa MD  Hospitalist Service  [facility]  Securely message with the Vocera Web Console (learn more here)  Text page via [employer] Paging/Directory   ______________________________________________________________________    Chief Complaint   Abnormal echo    History is obtained from the patient, electronic health record  and emergency department physician    History of Present Illness   Torrie Meyer is a 57 year old female with PMH of hypothyroidism, recent diagnosis of A. fib with slow ventricular response, presented to ED for evaluation of large left atrial mass.  She was diagnosed with atrial fibrillation accidentally, during precolonoscopy evaluation.  Prior to procedure, she was connected to telemetry, which showed atrial fibrillation.  Procedure was canceled, patient was referred for cardiology evaluation.  She was seen by  on August 18, Holter monitor and echo were requested.  Echo was read today, and showed large 4 x 4 cm left atrial mass, with possible thrombus.  Patient was referred for admission for cardiothoracic surgery evaluation.  He denies cardiac palpitations, chest pain.  Patient has mild exertional dyspnea.  She was a runner, but currently not able to do similar activity due to dyspnea.  Denies chest pain, cardiac palpitations, dizziness, orthopnea, PND, leg edema, cough, fever, closure, myalgia, unintentional weight gain.    Review of Systems    The 10 point Review of Systems is negative other than noted in the HPI or here.     Past Medical History    I have reviewed this patient's medical history and updated it with pertinent information if needed.     Past Surgical History   I have reviewed this patient's surgical history and updated it with pertinent information if needed.    Social History   I have reviewed this patient's social history and updated it with pertinent information if needed.  Social History     Tobacco Use     Smoking status: Never Smoker     Smokeless tobacco: Never Used       Family History   I have reviewed this patient's family history and updated it with pertinent information if needed.  Family History   Problem Relation Age of Onset     Cancer Mother         stomach     Cancer Father         prostate     No Known Problems Sister      No Known Problems Daughter      No Known Problems  Maternal Grandmother      No Known Problems Maternal Grandfather      No Known Problems Paternal Grandmother      No Known Problems Paternal Grandfather      No Known Problems Maternal Aunt      No Known Problems Paternal Aunt      Hereditary Breast and Ovarian Cancer Syndrome No family hx of      Breast Cancer No family hx of      Colon Cancer No family hx of      Endometrial Cancer No family hx of      Ovarian Cancer No family hx of        Prior to Admission Medications   Prior to Admission Medications   Prescriptions Last Dose Informant Patient Reported? Taking?   Vitamin D3 (CHOLECALCIFEROL) 25 mcg (1000 units) tablet 8/23/2022 at am  Yes Yes   Sig: Take 1 tablet by mouth daily   alendronate (FOSAMAX) 70 MG tablet 8/20/2022  Yes Yes   Sig: TAKE 1 TABLET BY MOUTH 1 TIME A WEEK   aspirin (ASA) 81 MG EC tablet 8/23/2022 at am  No Yes   Sig: Take 1 tablet (81 mg) by mouth daily   cyanocobalamin (VITAMIN B-12) 1000 MCG tablet 8/23/2022 at am  Yes Yes   Sig: Take 1,000 mcg by mouth daily   fish oil-omega-3 fatty acids 1000 MG capsule 8/23/2022 at am  Yes Yes   Sig: Take 1 g by mouth daily   levothyroxine (SYNTHROID/LEVOTHROID) 175 MCG tablet 8/23/2022 at am  Yes Yes   Sig: Take 175 mcg by mouth daily   multivitamin w/minerals (THERA-VIT-M) tablet 8/23/2022 at am  Yes Yes   Sig: Take 1 tablet by mouth daily   vitamin B-Complex 8/23/2022 at am  Yes Yes   Sig: Take 1 tablet by mouth daily   vitamin C (ASCORBIC ACID) 250 MG tablet 8/23/2022 at am  Yes Yes   Sig: Take 250 mg by mouth daily      Facility-Administered Medications: None     Allergies   No Known Allergies    Physical Exam   Vital Signs: Temp: 97  F (36.1  C) Temp src: Temporal BP: 121/82 Pulse: (!) 48   Resp: 20 SpO2: 98 % O2 Device: None (Room air)    Weight: 132 lbs 0 oz    General: Alert and oriented x 3. Not in obvious distress.  HEENT: NC, AT. Neck- supple, No JVP elevation, lymphadenopathy or thyromegaly. Trachea-central.  Chest: Clear to auscultation  bilaterally.  Heart: S1S2 regular. No M/R/G.  Abdomen: Soft. NT, ND. No organomegaly. Bowel sounds- active.  Back: No spine tenderness. No CVA tenderness.  Extremities: No leg swelling. Peripheral pulses 2+ bilaterally.  Neuro: Cranial nerves 1-12 grossly normal. No focal neurological deficit    Data   Data reviewed today: I reviewed all medications, new labs and imaging results over the last 24 hours. I personally reviewed    Recent Labs   Lab 22  1802 22  1041   WBC 5.8  --    HGB 14.3  --    MCV 95  --      --     140   POTASSIUM 3.5 4.6   CHLORIDE 107 103   CO2 26 28   BUN 18 18.3   CR 0.85 0.91   ANIONGAP 8 9   YEVGENIY 9.5 10.5*   GLC 79 61*   ALBUMIN  --  5.1   PROTTOTAL  --  7.1   BILITOTAL  --  0.4   ALKPHOS  --  64   ALT  --  43*   AST  --  37*     5.8    \    14.3    /    206   N 50    L N/A    141    107    18 /   ------------------------------------ 79   ALT N/A   AST N/A   AP N/A   ALB N/A   Ca 9.5  3.5    26    0.85 \    % RETIC N/A    LDH N/A  Troponin N/A    BNP N/A    CK N/A  INR N/A   PTT N/A    D-dimer N/A    Fibrinogen N/A    Antithrombin N/A  Ferritin N/A  CRP N/A    IL-6 N/A  Recent Results (from the past 24 hour(s))   Echocardiogram Complete   Result Value    LVEF  40-45% (mildly reduced)    [facility]    [id number]  [id number]  [id number]  [id number]^ANDREA^[place] [id number]     Name: LUISANA BYERS  MRN: [id number]  : 1965  Study Date: 2022 01:22 PM  Age: 57 yrs  Gender: Female  Patient Location: [facility]  Reason For Study: Atrial fibrillation with slow ventricular response (H)  Ordering Physician: KAVON MENDEZ  Referring Physician: KAVON MENDEZ  Performed By: JI     BSA: 8.6 m2  Height: 612 in  Weight: 137 lb  HR: 169  BP: 125/81 mmHg  ______________________________________________________________________________  Procedure  Complete Echo  Adult.  ______________________________________________________________________________  Interpretation Summary     Left ventricular function is decreased. The ejection fraction is 40-45%  (mildly reduced).  The left atrium is moderate to severely dilated.  Increased mitral inflow velocities related to occlusion from left atrial mass.  Leaflets appear normal.  4x4x4 cm mobile inhomogeneous left atrial mass. Appears to be attached to  interatrial septum, most consistent with left atrial myxoma. Cannot exclude  associated thrombus.Extends through mitral valve plane during diastole.  Mildly decreased right ventricular systolic function  ______________________________________________________________________________  Left Ventricle  Left ventricular function is decreased. The ejection fraction is 40-45%  (mildly reduced). There is normal left ventricular wall thickness. Left  ventricular diastolic function is abnormal. There is mild global hypokinesia  of the left ventricle.     Right Ventricle  The right ventricle is normal size. Mildly decreased right ventricular  systolic function.     Atria  A mass is seen in the left atrium. 4x4x4 cm mobile inhomogeneous left atrial  mass. Appears to be attached to interatrial septum, most consistent with left  atrial myxoma. Cannot exclude associated thrombus.Extends through mitral valve  plane during diastole. The left atrium is moderate to severely dilated. The  right atrium is mildly dilated.     Mitral Valve  There is trace to mild mitral regurgitation. Increased mitral valve velocity.  Increased mitral inflow velocities related to occlusion from left atrial mass.  Leaflets appear normal.     Tricuspid Valve  Tricuspid valve leaflets appear normal. There is no evidence of tricuspid  stenosis or clinically significant tricuspid regurgitation. There is trace to  mild tricuspid regurgitation. Right ventricle systolic pressure estimate  normal.     Aortic Valve  Aortic valve  leaflets appear normal. There is no evidence of aortic stenosis  or clinically significant aortic regurgitation.     Pulmonic Valve  The pulmonic valve is not well visualized.     Vessels  The aorta root is normal. The ascending aorta is Borderline dilated.     Pericardium  There is no pericardial effusion.     Rhythm  The rhythm was atrial fibrillation.  ______________________________________________________________________________  MMode/2D Measurements & Calculations  IVSd: 0.94 cm     LVIDd: 4.4 cm  LVIDs: 3.3 cm  LVPWd: 0.92 cm  FS: 24.8 %  LV mass(C)d: 134.2 grams  LV mass(C)dI: 15.7 grams/m2  Ao root diam: 3.1 cm  LA dimension: 4.5 cm  asc Aorta Diam: 3.7 cm  LA/Ao: 1.4  LVOT diam: 2.1 cm  LVOT area: 3.4 cm2  LA Volume Indexed (AL/bp): 16.7 ml/m2  RWT: 0.42     Time Measurements  MM HR: 164.0 BPM     Doppler Measurements & Calculations  MV E max nikhil: 99.9 cm/sec  MV dec time: 0.31 sec  Ao V2 max: 91.9 cm/sec  Ao max PG: 3.0 mmHg  Ao V2 mean: 67.7 cm/sec  Ao mean P.0 mmHg  Ao V2 VTI: 20.8 cm  AYAN(I,D): 2.6 cm2  AYAN(V,D): 2.9 cm2  LV V1 max P.4 mmHg  LV V1 max: 78.0 cm/sec  LV V1 VTI: 15.8 cm  SV(LVOT): 53.3 ml  SI(LVOT): 6.2 ml/m2  PA acc time: 0.19 sec  TR max nikhil: 227.0 cm/sec  TR max P.6 mmHg  AV Nikhil Ratio (DI): 0.85  AYAN Index (cm2/m2): 0.30  E/E': 9.5  E/E' av.4  Lateral E/e': 9.6  Medial E/e': 13.3     Peak E' Nikhil: 10.5 cm/sec     ______________________________________________________________________________  Report approved by: Luis Duke 2022 02:39 PM

## 2022-08-24 NOTE — CONSULTS
Cardiothoracic Surgery Consult    Date of Service: 8/24/2022    REFERRING CARDIOLOGIST: Dr. Ibanez    REASON FOR CONSULTATION: Left atrial mass     HISTORY OF PRESENT ILLNESS: Torrie Meyer is a 57 year old year-old female with limited PMH (hypothyroidism) who presented to [facility] ED yesterday after outpatient echocardiogram resulted with large left atrial mass. CV Surgery is consulted for possible surgical removal of this mass.    Patient reports that she was due for colonoscopy this week and routine pre-op EKG showed a-fib with slow ventricular response with a rate in the 40s. Colonoscopy was deferred for further work-up of this. She was seen by both her PCP and cardiology. Outpatient echocardiogram ordered by cardiology demonstrated [id number] mobile left atrial mass. She remains in a-fib with rates 30-50s while inpatient. [id number] score is 1 and as such, she is not anticoagulated. She is on ASA per cardiology.     Patient overall denies symptoms, but says that she has had 18-24 months of noticeable shortness of breath with ascending stairs; she attributed this to age and not exercising enough. Also endorses some lightheadedness when standing when she thinks about it further. Denies palpitations, edema, chest pain, cough, orthopnea, hemoptysis, overall fatigue. She has no neurologic symptoms.     She is generally active and jogs for exercise regularly. She lives with her fiance nearby and one child still at home. She works part-time jobs to keep busy and interact with people.     Patient denies history of bleeding/clotting issues and issues with anesthesia in the past. She has not had previous chest surgeries    PAST MEDICAL HISTORY:   No past medical history on file.    PAST SURGICAL HISTORY:   No past surgical history on file.    ALLERGIES:   No Known Allergies       CURRENT MEDICATIONS:  Current Facility-Administered Medications   Medication     aspirin EC tablet 81 mg     cyanocobalamin (VITAMIN B-12)  tablet 1,000 mcg     levothyroxine (SYNTHROID/LEVOTHROID) tablet 175 mcg     magnesium oxide (MAG-OX) tablet 400 mg     melatonin tablet 1 mg     ondansetron (ZOFRAN ODT) ODT tab 4 mg    Or     ondansetron (ZOFRAN) injection 4 mg     traZODone (DESYREL) tablet 50 mg     Current Outpatient Medications   Medication     alendronate (FOSAMAX) 70 MG tablet     aspirin (ASA) 81 MG EC tablet     cyanocobalamin (VITAMIN B-12) 1000 MCG tablet     fish oil-omega-3 fatty acids 1000 MG capsule     levothyroxine (SYNTHROID/LEVOTHROID) 175 MCG tablet     multivitamin w/minerals (THERA-VIT-M) tablet     vitamin B-Complex     vitamin C (ASCORBIC ACID) 250 MG tablet     Vitamin D3 (CHOLECALCIFEROL) 25 mcg (1000 units) tablet         FAMILY HISTORY:   Family History   Problem Relation Age of Onset     Cancer Mother         stomach     Cancer Father         prostate     No Known Problems Sister      No Known Problems Daughter      No Known Problems Maternal Grandmother      No Known Problems Maternal Grandfather      No Known Problems Paternal Grandmother      No Known Problems Paternal Grandfather      No Known Problems Maternal Aunt      No Known Problems Paternal Aunt      Hereditary Breast and Ovarian Cancer Syndrome No family hx of      Breast Cancer No family hx of      Colon Cancer No family hx of      Endometrial Cancer No family hx of      Ovarian Cancer No family hx of          SOCIAL HISTORY:   Social History     Socioeconomic History     Marital status: Single     Spouse name: Not on file     Number of children: Not on file     Years of education: Not on file     Highest education level: Not on file   Occupational History     Not on file   Tobacco Use     Smoking status: Never Smoker     Smokeless tobacco: Never Used   Substance and Sexual Activity     Alcohol use: Not on file     Drug use: Not on file     Sexual activity: Not on file   Other Topics Concern     Not on file   Social History Narrative     Not on file  "    Social Determinants of Health     Financial Resource Strain: Not on file   Food Insecurity: Not on file   Transportation Needs: Not on file   Physical Activity: Not on file   Stress: Not on file   Social Connections: Not on file   Intimate Partner Violence: Not on file   Housing Stability: Not on file       REVIEW OF SYSTEMS:  CONSTITUTIONAL: No weight loss, fever   SKIN: No rash  CARDIOVASCULAR: No chest pain or chest discomfort. No palpitations or edema.   RESPIRATORY: No current shortness of breath or cough.  GASTROINTESTINAL: No nausea, vomiting or diarrhea. No abdominal pain.  NEUROLOGICAL: No headache, dizziness, syncope  HEMATOLOGIC: No anemia, bleeding or bruising.     PHYSICAL EXAMINATION:   Vitals: /69 (BP Location: Left arm, Patient Position: Supine)   Pulse (!) 40   Temp 98.2  F (36.8  C)   Resp 18   Ht 1.549 m (5' 1\")   Wt 59.9 kg (132 lb)   SpO2 98%   BMI 24.94 kg/m    GENERAL:  Well developed and well nourished   CARDIOVASCULAR: A-fib on monitor; no appreciable edema.  RESPIRATIONS: Non-labored breathing on room air.  ABDOMEN: Soft, non-distended, non-tender  EXTREMITIES: No deformity, no edema  NEUROLOGIC: Intact and symmetric with no focal deficits.   PSYCHIATRIC: Alert and oriented x3, pleasant     LABORATORY STUDIES:   Lab Results   Component Value Date    WBC 5.8 08/23/2022    HGB 14.3 08/23/2022    HCT 40.9 08/23/2022    MCV 95 08/23/2022     08/23/2022      Lab Results   Component Value Date    BUN 18 08/23/2022     08/23/2022    CO2 26 08/23/2022     No results found for: HGBA1C    EKG 8/23/2022:   A-flutter with variable A-V block; rate 37bpm    CARDIAC CATHETERIZATION:   Pending    TRANSTHORACIC ECHOCARDIOGRAM 8/23/2022:   Left ventricular function is decreased. The ejection fraction is 40-45%  (mildly reduced).  The left atrium is moderate to severely dilated.  Increased mitral inflow velocities related to occlusion from left atrial mass.  Leaflets appear " normal.  4x4x4 cm mobile inhomogeneous left atrial mass. Appears to be attached to interatrial septum, most consistent with left atrial myxoma. Cannot exclude associated thrombus.Extends through mitral valve plane during diastole.  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 Echocardiography demonstrates slightly reduced systolic function with an EF of 40-45%. After review of the above information, we believe that she is a reasonable surgical candidate. Our team discussed the technical details of left atrial mass excision with the patient, as well as the expected postoperative course and recovery. The risks include but are not limited to bleeding, infection, stroke, heart or graft failure, dysrhythmia, respiratory failure, kidney or liver injury, bowel or limb ischemia, and death. The STS database does not stratify risk of this procedure, however, her risk of mortality and/or stroke related to surgery are quite low.    - Tentative plan for left atrial mass excision. Timing TBD at this point, but surgery during this hospitalization is more likely than outpatient.  - Awaiting cardiology recs and MARII ordered by cards  - Will need coronary angiogram > CT angio (not ordered--will await cardiology input), carotid US (ordered) and CXR (ordered).      - Remainder of cares per primary team.       Thank you very much for this referral.       Patient and plan discussed with attending.        _______  Daphne Robbins PA-C  Cardiothoracic Surgery  [phone]

## 2022-08-25 LAB
ABO/RH(D): NORMAL
ANION GAP SERPL CALCULATED.3IONS-SCNC: 7 MMOL/L (ref 5–18)
ANTIBODY SCREEN: NEGATIVE
BUN SERPL-MCNC: 11 MG/DL (ref 8–22)
CALCIUM SERPL-MCNC: 8.7 MG/DL (ref 8.5–10.5)
CHLORIDE BLD-SCNC: 108 MMOL/L (ref 98–107)
CO2 SERPL-SCNC: 25 MMOL/L (ref 22–31)
CREAT SERPL-MCNC: 0.69 MG/DL (ref 0.6–1.1)
GFR SERPL CREATININE-BSD FRML MDRD: >90 ML/MIN/1.73M2
GLUCOSE BLD-MCNC: 94 MG/DL (ref 70–125)
HOLD SPECIMEN: NORMAL
POTASSIUM BLD-SCNC: 4 MMOL/L (ref 3.5–5)
SODIUM SERPL-SCNC: 140 MMOL/L (ref 136–145)
SPECIMEN EXPIRATION DATE: NORMAL
UFH PPP CHRO-ACNC: 0.41 IU/ML

## 2022-08-25 PROCEDURE — 85520 HEPARIN ASSAY: CPT | Performed by: INTERNAL MEDICINE

## 2022-08-25 PROCEDURE — 250N000011 HC RX IP 250 OP 636: Performed by: INTERNAL MEDICINE

## 2022-08-25 PROCEDURE — B2111ZZ FLUOROSCOPY OF MULTIPLE CORONARY ARTERIES USING LOW OSMOLAR CONTRAST: ICD-10-PCS | Performed by: INTERNAL MEDICINE

## 2022-08-25 PROCEDURE — 99232 SBSQ HOSP IP/OBS MODERATE 35: CPT | Performed by: INTERNAL MEDICINE

## 2022-08-25 PROCEDURE — 272N000001 HC OR GENERAL SUPPLY STERILE: Performed by: INTERNAL MEDICINE

## 2022-08-25 PROCEDURE — 80048 BASIC METABOLIC PNL TOTAL CA: CPT | Performed by: NURSE PRACTITIONER

## 2022-08-25 PROCEDURE — 93454 CORONARY ARTERY ANGIO S&I: CPT | Mod: 26 | Performed by: INTERNAL MEDICINE

## 2022-08-25 PROCEDURE — C1894 INTRO/SHEATH, NON-LASER: HCPCS | Performed by: INTERNAL MEDICINE

## 2022-08-25 PROCEDURE — 258N000003 HC RX IP 258 OP 636

## 2022-08-25 PROCEDURE — C1887 CATHETER, GUIDING: HCPCS | Performed by: INTERNAL MEDICINE

## 2022-08-25 PROCEDURE — 99152 MOD SED SAME PHYS/QHP 5/>YRS: CPT | Performed by: INTERNAL MEDICINE

## 2022-08-25 PROCEDURE — 250N000009 HC RX 250: Performed by: INTERNAL MEDICINE

## 2022-08-25 PROCEDURE — 93454 CORONARY ARTERY ANGIO S&I: CPT | Performed by: INTERNAL MEDICINE

## 2022-08-25 PROCEDURE — 258N000003 HC RX IP 258 OP 636: Performed by: INTERNAL MEDICINE

## 2022-08-25 PROCEDURE — 250N000013 HC RX MED GY IP 250 OP 250 PS 637: Performed by: INTERNAL MEDICINE

## 2022-08-25 PROCEDURE — 250N000013 HC RX MED GY IP 250 OP 250 PS 637: Performed by: STUDENT IN AN ORGANIZED HEALTH CARE EDUCATION/TRAINING PROGRAM

## 2022-08-25 PROCEDURE — 250N000013 HC RX MED GY IP 250 OP 250 PS 637: Performed by: NURSE PRACTITIONER

## 2022-08-25 PROCEDURE — 36415 COLL VENOUS BLD VENIPUNCTURE: CPT | Performed by: INTERNAL MEDICINE

## 2022-08-25 PROCEDURE — 99232 SBSQ HOSP IP/OBS MODERATE 35: CPT | Mod: 25 | Performed by: INTERNAL MEDICINE

## 2022-08-25 PROCEDURE — 210N000001 HC R&B IMCU HEART CARE

## 2022-08-25 RX ORDER — ACETAMINOPHEN 650 MG/1
650 SUPPOSITORY RECTAL EVERY 4 HOURS PRN
Status: DISCONTINUED | OUTPATIENT
Start: 2022-08-25 | End: 2022-08-29

## 2022-08-25 RX ORDER — FLUMAZENIL 0.1 MG/ML
0.2 INJECTION, SOLUTION INTRAVENOUS
Status: ACTIVE | OUTPATIENT
Start: 2022-08-25 | End: 2022-08-25

## 2022-08-25 RX ORDER — HEPARIN SODIUM 10000 [USP'U]/100ML
0-5000 INJECTION, SOLUTION INTRAVENOUS CONTINUOUS
Status: DISCONTINUED | OUTPATIENT
Start: 2022-08-25 | End: 2022-08-29

## 2022-08-25 RX ORDER — LIDOCAINE 40 MG/G
CREAM TOPICAL
Status: DISCONTINUED | OUTPATIENT
Start: 2022-08-25 | End: 2022-08-25 | Stop reason: HOSPADM

## 2022-08-25 RX ORDER — NITROGLYCERIN 0.4 MG/1
TABLET SUBLINGUAL
Status: DISCONTINUED | OUTPATIENT
Start: 2022-08-25 | End: 2022-08-25 | Stop reason: HOSPADM

## 2022-08-25 RX ORDER — ATROPINE SULFATE 0.1 MG/ML
0.5 INJECTION INTRAVENOUS
Status: ACTIVE | OUTPATIENT
Start: 2022-08-25 | End: 2022-08-25

## 2022-08-25 RX ORDER — ACETAMINOPHEN 325 MG/1
650 TABLET ORAL EVERY 4 HOURS PRN
Status: DISCONTINUED | OUTPATIENT
Start: 2022-08-25 | End: 2022-08-25

## 2022-08-25 RX ORDER — NALOXONE HYDROCHLORIDE 0.4 MG/ML
0.2 INJECTION, SOLUTION INTRAMUSCULAR; INTRAVENOUS; SUBCUTANEOUS
Status: ACTIVE | OUTPATIENT
Start: 2022-08-25 | End: 2022-08-25

## 2022-08-25 RX ORDER — ASPIRIN 325 MG
325 TABLET ORAL ONCE
Status: COMPLETED | OUTPATIENT
Start: 2022-08-25 | End: 2022-08-25

## 2022-08-25 RX ORDER — NALOXONE HYDROCHLORIDE 0.4 MG/ML
0.4 INJECTION, SOLUTION INTRAMUSCULAR; INTRAVENOUS; SUBCUTANEOUS
Status: ACTIVE | OUTPATIENT
Start: 2022-08-25 | End: 2022-08-25

## 2022-08-25 RX ORDER — IOPAMIDOL 755 MG/ML
INJECTION, SOLUTION INTRAVASCULAR
Status: DISCONTINUED | OUTPATIENT
Start: 2022-08-25 | End: 2022-08-25 | Stop reason: HOSPADM

## 2022-08-25 RX ORDER — ASPIRIN 81 MG/1
243 TABLET, CHEWABLE ORAL ONCE
Status: COMPLETED | OUTPATIENT
Start: 2022-08-25 | End: 2022-08-25

## 2022-08-25 RX ORDER — OXYCODONE HYDROCHLORIDE 5 MG/1
10 TABLET ORAL EVERY 4 HOURS PRN
Status: DISCONTINUED | OUTPATIENT
Start: 2022-08-25 | End: 2022-08-29

## 2022-08-25 RX ORDER — ACETAMINOPHEN 325 MG/1
650 TABLET ORAL EVERY 4 HOURS PRN
Status: DISCONTINUED | OUTPATIENT
Start: 2022-08-25 | End: 2022-08-29

## 2022-08-25 RX ORDER — OXYCODONE HYDROCHLORIDE 5 MG/1
5 TABLET ORAL EVERY 4 HOURS PRN
Status: DISCONTINUED | OUTPATIENT
Start: 2022-08-25 | End: 2022-08-29

## 2022-08-25 RX ORDER — FENTANYL CITRATE 50 UG/ML
INJECTION, SOLUTION INTRAMUSCULAR; INTRAVENOUS
Status: DISCONTINUED | OUTPATIENT
Start: 2022-08-25 | End: 2022-08-25 | Stop reason: HOSPADM

## 2022-08-25 RX ORDER — SODIUM CHLORIDE 9 MG/ML
INJECTION, SOLUTION INTRAVENOUS CONTINUOUS
Status: DISCONTINUED | OUTPATIENT
Start: 2022-08-25 | End: 2022-08-25 | Stop reason: HOSPADM

## 2022-08-25 RX ORDER — DIAZEPAM 10 MG
10 TABLET ORAL ONCE
Status: COMPLETED | OUTPATIENT
Start: 2022-08-25 | End: 2022-08-25

## 2022-08-25 RX ORDER — HYDRALAZINE HYDROCHLORIDE 20 MG/ML
10 INJECTION INTRAMUSCULAR; INTRAVENOUS
Status: DISCONTINUED | OUTPATIENT
Start: 2022-08-25 | End: 2022-08-29

## 2022-08-25 RX ORDER — SODIUM CHLORIDE 9 MG/ML
75 INJECTION, SOLUTION INTRAVENOUS CONTINUOUS
Status: ACTIVE | OUTPATIENT
Start: 2022-08-25 | End: 2022-08-25

## 2022-08-25 RX ORDER — LANOLIN ALCOHOL/MO/W.PET/CERES
3 CREAM (GRAM) TOPICAL AT BEDTIME
Status: DISCONTINUED | OUTPATIENT
Start: 2022-08-25 | End: 2022-08-29

## 2022-08-25 RX ADMIN — Medication 3 MG: at 20:41

## 2022-08-25 RX ADMIN — ACETAMINOPHEN 650 MG: 325 TABLET ORAL at 08:27

## 2022-08-25 RX ADMIN — SODIUM CHLORIDE 250 ML: 9 INJECTION, SOLUTION INTRAVENOUS at 16:13

## 2022-08-25 RX ADMIN — LEVOTHYROXINE SODIUM 175 MCG: 0.03 TABLET ORAL at 06:52

## 2022-08-25 RX ADMIN — Medication 1000 MCG: at 08:15

## 2022-08-25 RX ADMIN — ASPIRIN 325 MG: 325 TABLET ORAL at 08:15

## 2022-08-25 RX ADMIN — DIAZEPAM 10 MG: 5 TABLET ORAL at 10:45

## 2022-08-25 RX ADMIN — Medication 400 MG: at 20:41

## 2022-08-25 RX ADMIN — SODIUM CHLORIDE 500 ML: 9 INJECTION, SOLUTION INTRAVENOUS at 19:19

## 2022-08-25 RX ADMIN — SODIUM CHLORIDE, PRESERVATIVE FREE: 5 INJECTION INTRAVENOUS at 08:30

## 2022-08-25 RX ADMIN — ONDANSETRON 4 MG: 4 TABLET, ORALLY DISINTEGRATING ORAL at 11:55

## 2022-08-25 RX ADMIN — Medication 400 MG: at 08:15

## 2022-08-25 RX ADMIN — HEPARIN SODIUM 700 UNITS/HR: 10000 INJECTION, SOLUTION INTRAVENOUS at 15:30

## 2022-08-25 RX ADMIN — ACETAMINOPHEN 650 MG: 325 TABLET ORAL at 04:13

## 2022-08-25 ASSESSMENT — ACTIVITIES OF DAILY LIVING (ADL)
ADLS_ACUITY_SCORE: 18

## 2022-08-25 NOTE — ED NOTES
"[facility] ED Handoff Report    ED Chief Complaint:     ED Diagnosis:  (D15.1) Atrial myxoma  Comment:   Plan: Case Request: Coronary Angiogram            (R06.02) Short of breath on exertion  Comment:   Plan:        PMH:  No past medical history on file.     Code Status:  Full Code     Falls Risk: No Band: Not applicable    Current Living Situation/Residence: lives with a significant other     Elimination Status: Continent: Yes     Activity Level: Ind.    Patients Preferred Language:  English     Needed: No    Vital Signs:  BP 98/64 (BP Location: Left arm)   Pulse 50   Temp 97.9  F (36.6  C) (Oral)   Resp 20   Ht 1.549 m (5' 1\")   Wt 59.9 kg (132 lb)   SpO2 99%   BMI 24.94 kg/m       Cardiac Rhythm: Sinus Rob    Pain Score: 0/10    Is the Patient Confused:  No    Last Food or Drink: 08/24/22    Focused Assessment:      Tests Performed: Done: Labs and Imaging    Treatments Provided:  Heparin infusion running.     Family Dynamics/Concerns: No    Family Updated On Visitor Policy: Yes    Plan of Care Communicated to Family: No    Who Was Updated about Plan of Care: Patient     Belongings Checklist Done and Signed by Patient: Yes    Medications sent with patient:     Covid: asymptomatic , negative    Additional Information:     RN: Joellen Lazcano RN   8/24/2022 8:18 PM       "

## 2022-08-25 NOTE — PRE-PROCEDURE
GENERAL PRE-PROCEDURE:   Procedure:  Coronary angiogram  Date/Time:  8/25/2022 10:48 AM    Written consent obtained?: Yes    Risks and benefits: Risks, benefits and alternatives were discussed    Consent given by:  Patient  Patient states understanding of procedure being performed: Yes    Patient's understanding of procedure matches consent: Yes    Procedure consent matches procedure scheduled: Yes    Expected level of sedation:  Moderate  Appropriately NPO:  Yes  ASA Class:  3 (atrial myxoma, cardiomyopathy, atrial fibrilation, probable CHRISSY)  Mallampati  :  Grade 3- soft palate visible, posterior pharyngeal wall not visible  Lungs:  Lungs clear with good breath sounds bilaterally  Heart:  A-fib  History & Physical reviewed:  History and physical reviewed and no updates needed  Statement of review:  I have reviewed the lab findings, diagnostic data, medications, and the plan for sedation

## 2022-08-25 NOTE — PLAN OF CARE
Problem: Plan of Care - These are the overarching goals to be used throughout the patient stay.    Goal: Optimal Comfort and Wellbeing  Intervention: Monitor Pain and Promote Comfort  Pt anxious about angio this AM, answered questions to ease worries. Pain has been controlled with medications (see MAR). Post angio, pt's right radial site has been WDL. Pt had hypotension (asymptomatic),  managed with a NS bolus. Pt denies pain.

## 2022-08-25 NOTE — PLAN OF CARE
"  Problem: Plan of Care - These are the overarching goals to be used throughout the patient stay.    Goal: Optimal Comfort and Wellbeing  Outcome: Ongoing, Progressing  Intervention: Monitor Pain and Promote Comfort  Recent Flowsheet Documentation  Taken 8/25/2022 0458 by Sosa Biswas, RN  Pain Management Interventions: declines  Taken 8/25/2022 0413 by Sosa Biswas, RN  Pain Management Interventions: medication (see MAR)  Taken 8/24/2022 2100 by Sosa Biswas RN  Pain Management Interventions: declines     Problem: Dysrhythmia  Goal: Normalized Cardiac Rhythm  Outcome: Ongoing, Progressing     Goal Outcome Evaluation:  VSS this shift, rhythm remains a.fib CVR to SVR rates from 40-60s, can be in the 100s with movement to the bathroom. Pt. Remains asymptomatic.  Education about NPO status, and about angio procedure prep provided.  Pt. Express understanding of the education.  Pt. Asked to have hair clipping and education video in the AM as she was tired as it was a \"long day with a lot of information\"  Pt. Also reported that she felt a \"little less anxious\" after education. Pt. Reported headache and requested prn medication. MD notified and 650 mg of tylenol ordered and administered.                  "

## 2022-08-25 NOTE — PROGRESS NOTES
"  HEART CARE CONSULTATON NOTE        Assessment/Recommendations   Assessment:  1.  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  Discussed with patient and CV surgery and potential plan for surgery on Monday.  2.  Anticoagulation: We will start on heparin drip  3.  Atrial fibrillation: Atrial fibrillation with slow ventricular response.  Can reassess postoperatively to determine further management of atrial fibrillation.  4.  Cardiomyopathy: Left ventricular ejection fraction decreased to 40-45%.    Normal coronaries on angiogram     Plan:  1.  CV surgery following and plan on surgery on Monday with Dr. Loredo  2.  Continue heparin drip  3.  Further management of atrial fibrillation will be determined postoperatively     History of Present Illness/Subjective    Telemetry shows atrial fibrillation with slow ventricular response  Patient overall feeling well without lightheadedness, chest pain or palpitation     Physical Examination  Review of Systems   VITALS: BP 94/61   Pulse 55   Temp 97.6  F (36.4  C) (Oral)   Resp 18   Ht 1.549 m (5' 1\")   Wt 59.6 kg (131 lb 8 oz)   SpO2 100%   BMI 24.85 kg/m    BMI: Body mass index is 24.85 kg/m .  Wt Readings from Last 3 Encounters:   08/25/22 59.6 kg (131 lb 8 oz)   08/18/22 62.1 kg (137 lb)       Intake/Output Summary (Last 24 hours) at 8/25/2022 1515  Last data filed at 8/25/2022 1500  Gross per 24 hour   Intake 600 ml   Output 1300 ml   Net -700 ml     General Appearance:   no distress, normal body habitus   ENT/Mouth: membranes moist, no oral lesions or bleeding gums.      EYES:  no scleral icterus, normal conjunctivae   Neck: no carotid bruits or thyromegaly   Chest/Lungs:   lungs are clear to auscultation   Cardiovascular:   irregular. Normal first and second heart sounds with no murmurs  no edema bilaterally    Abdomen:  no organomegaly, " masses, bruits, or tenderness; bowel sounds are present   Extremities: no cyanosis or clubbing   Skin: no xanthelasma, warm.    Neurologic: normal  bilateral, no tremors     Psychiatric: alert and oriented x3, calm     Review Of Systems  Skin: negative  Eyes: negative  Ears/Nose/Throat: negative  Respiratory: No shortness of breath, dyspnea on exertion, cough, or hemoptysis  Cardiovascular: negative  Gastrointestinal: negative  Genitourinary: negative  Musculoskeletal: negative  Neurologic: negative  Psychiatric: negative  Hematologic/Lymphatic/Immunologic: negative  Endocrine: negative          Lab Results    Chemistry/lipid CBC Cardiac Enzymes/BNP/TSH/INR   Recent Labs   Lab Test 08/23/22  1802   CHOL 187   HDL 62      TRIG 66     Recent Labs   Lab Test 08/23/22  1802 07/23/20  1038    83     Recent Labs   Lab Test 08/25/22  0353      POTASSIUM 4.0   CHLORIDE 108*   CO2 25   GLC 94   BUN 11   CR 0.69   GFRESTIMATED >90   YEVGENIY 8.7     Recent Labs   Lab Test 08/25/22  0353 08/23/22  1802 08/17/22  1041   CR 0.69 0.85 0.91     Recent Labs   Lab Test 08/24/22  1437   A1C 4.9          Recent Labs   Lab Test 08/24/22  1437   WBC 8.3   HGB 15.5   HCT 45.9   MCV 96        Recent Labs   Lab Test 08/24/22  1437 08/23/22  1802   HGB 15.5 14.3    No results for input(s): TROPONINI in the last 78017 hours.  No results for input(s): BNP, NTBNPI, NTBNP in the last 66205 hours.  Recent Labs   Lab Test 08/17/22  1041   TSH 78.80*     No results for input(s): INR in the last 24710 hours.     Medical History  Surgical History Family History Social History   Atrial fibrillation No past surgical history on file.  Family History   Problem Relation Age of Onset     Cancer Mother         stomach     Cancer Father         prostate     No Known Problems Sister      No Known Problems Daughter      No Known Problems Maternal Grandmother      No Known Problems Maternal Grandfather      No Known Problems Paternal  Grandmother      No Known Problems Paternal Grandfather      No Known Problems Maternal Aunt      No Known Problems Paternal Aunt      Hereditary Breast and Ovarian Cancer Syndrome No family hx of      Breast Cancer No family hx of      Colon Cancer No family hx of      Endometrial Cancer No family hx of      Ovarian Cancer No family hx of         Social History     Socioeconomic History     Marital status: Single     Spouse name: Not on file     Number of children: Not on file     Years of education: Not on file     Highest education level: Not on file   Occupational History     Not on file   Tobacco Use     Smoking status: Never Smoker     Smokeless tobacco: Never Used   Substance and Sexual Activity     Alcohol use: Not on file     Drug use: Not on file     Sexual activity: Not on file   Other Topics Concern     Not on file   Social History Narrative     Not on file     Social Determinants of Health     Financial Resource Strain: Not on file   Food Insecurity: Not on file   Transportation Needs: Not on file   Physical Activity: Not on file   Stress: Not on file   Social Connections: Not on file   Intimate Partner Violence: Not on file   Housing Stability: Not on file         Medications  Allergies   No current outpatient medications on file.      No Known Allergies      Leesa Ibanez MD

## 2022-08-26 LAB
ALBUMIN UR-MCNC: NEGATIVE MG/DL
APPEARANCE UR: CLEAR
APTT PPP: 62 SECONDS (ref 22–38)
BILIRUB UR QL STRIP: NEGATIVE
COLOR UR AUTO: NORMAL
GLUCOSE UR STRIP-MCNC: NEGATIVE MG/DL
HGB UR QL STRIP: NEGATIVE
INR PPP: 1.12 (ref 0.85–1.15)
KETONES UR STRIP-MCNC: NEGATIVE MG/DL
LEUKOCYTE ESTERASE UR QL STRIP: NEGATIVE
MRSA DNA SPEC QL NAA+PROBE: NEGATIVE
NITRATE UR QL: NEGATIVE
PH UR STRIP: 7 [PH] (ref 5–7)
PREALB SERPL IA-MCNC: 25 MG/DL (ref 19–38)
RBC URINE: 0 /HPF
SA TARGET DNA: NEGATIVE
SP GR UR STRIP: 1.01 (ref 1–1.03)
SQUAMOUS EPITHELIAL: <1 /HPF
UFH PPP CHRO-ACNC: 0.29 IU/ML
UROBILINOGEN UR STRIP-MCNC: <2 MG/DL
WBC URINE: <1 /HPF

## 2022-08-26 PROCEDURE — 85520 HEPARIN ASSAY: CPT | Performed by: INTERNAL MEDICINE

## 2022-08-26 PROCEDURE — 250N000013 HC RX MED GY IP 250 OP 250 PS 637: Performed by: STUDENT IN AN ORGANIZED HEALTH CARE EDUCATION/TRAINING PROGRAM

## 2022-08-26 PROCEDURE — 99232 SBSQ HOSP IP/OBS MODERATE 35: CPT | Performed by: INTERNAL MEDICINE

## 2022-08-26 PROCEDURE — 81001 URINALYSIS AUTO W/SCOPE: CPT | Performed by: THORACIC SURGERY (CARDIOTHORACIC VASCULAR SURGERY)

## 2022-08-26 PROCEDURE — 250N000011 HC RX IP 250 OP 636: Performed by: INTERNAL MEDICINE

## 2022-08-26 PROCEDURE — 258N000003 HC RX IP 258 OP 636

## 2022-08-26 PROCEDURE — 210N000001 HC R&B IMCU HEART CARE

## 2022-08-26 PROCEDURE — 85730 THROMBOPLASTIN TIME PARTIAL: CPT | Performed by: THORACIC SURGERY (CARDIOTHORACIC VASCULAR SURGERY)

## 2022-08-26 PROCEDURE — 36415 COLL VENOUS BLD VENIPUNCTURE: CPT | Performed by: INTERNAL MEDICINE

## 2022-08-26 PROCEDURE — 36415 COLL VENOUS BLD VENIPUNCTURE: CPT | Performed by: THORACIC SURGERY (CARDIOTHORACIC VASCULAR SURGERY)

## 2022-08-26 PROCEDURE — 250N000013 HC RX MED GY IP 250 OP 250 PS 637: Performed by: INTERNAL MEDICINE

## 2022-08-26 PROCEDURE — 84134 ASSAY OF PREALBUMIN: CPT | Performed by: THORACIC SURGERY (CARDIOTHORACIC VASCULAR SURGERY)

## 2022-08-26 PROCEDURE — 85610 PROTHROMBIN TIME: CPT | Performed by: THORACIC SURGERY (CARDIOTHORACIC VASCULAR SURGERY)

## 2022-08-26 PROCEDURE — 87641 MR-STAPH DNA AMP PROBE: CPT | Performed by: THORACIC SURGERY (CARDIOTHORACIC VASCULAR SURGERY)

## 2022-08-26 RX ADMIN — HEPARIN SODIUM 700 UNITS/HR: 10000 INJECTION, SOLUTION INTRAVENOUS at 21:58

## 2022-08-26 RX ADMIN — Medication 1000 MCG: at 07:50

## 2022-08-26 RX ADMIN — LEVOTHYROXINE SODIUM 175 MCG: 0.03 TABLET ORAL at 06:53

## 2022-08-26 RX ADMIN — SODIUM CHLORIDE 500 ML: 9 INJECTION, SOLUTION INTRAVENOUS at 21:57

## 2022-08-26 RX ADMIN — Medication 400 MG: at 20:30

## 2022-08-26 RX ADMIN — ACETAMINOPHEN 650 MG: 325 TABLET ORAL at 20:31

## 2022-08-26 RX ADMIN — OXYCODONE HYDROCHLORIDE 5 MG: 5 TABLET ORAL at 21:55

## 2022-08-26 RX ADMIN — Medication 400 MG: at 07:50

## 2022-08-26 RX ADMIN — ACETAMINOPHEN 650 MG: 325 TABLET ORAL at 07:03

## 2022-08-26 ASSESSMENT — ACTIVITIES OF DAILY LIVING (ADL)
ADLS_ACUITY_SCORE: 18

## 2022-08-26 NOTE — PLAN OF CARE
Problem: Plan of Care - These are the overarching goals to be used throughout the patient stay.    Goal: Plan of Care Review/Shift Note  Outcome: Ongoing, Progressing    Problem: Dysrhythmia  Goal: Normalized Cardiac Rhythm  Outcome: Ongoing, Progressing    Pt has had a good day. Calm and cooperative. Ambulating the hallway x's 4 today. HR: A-fib with SVR HR upper 30's to 60's. A-Fib HR 78 and 67.  Heparin infusing at 700 units/hr  Independent in room.  BP: 112/70, 121/59 and 87/61. Doctor updated. Pt is asymptomatic.   UA sent, MRSA culture sent.

## 2022-08-26 NOTE — PROGRESS NOTES
"  HEART CARE CONSULTATON NOTE        Assessment/Recommendations   Assessment:  1.  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  Discussed with patient and CV surgery and potential plan for surgery on Monday.  2.  Anticoagulation: Currently on a heparin drip  3.  Atrial fibrillation: Atrial fibrillation with slow ventricular response.  Can reassess postoperatively to determine further management of atrial fibrillation.  4.  Cardiomyopathy: Left ventricular ejection fraction decreased to 40-45%.    Normal coronaries on angiogram     Plan:  1.  CV surgery following and plan on surgery on Monday with Dr. Loredo  2.  Continue heparin drip  3.  Further management of atrial fibrillation will be determined postoperatively     History of Present Illness/Subjective    Telemetry shows atrial fibrillation with slow ventricular response  Low blood pressures overnight with some mild lightheadedness and she has noticed some chest discomfort at times when she lies flat.  Given IV bolus with improvement.  This morning sitting up doing work on her computer and feels well without complaints.     Physical Examination  Review of Systems   VITALS: BP 92/59 (BP Location: Left arm)   Pulse 58   Temp 97.8  F (36.6  C) (Oral)   Resp 18   Ht 1.549 m (5' 1\")   Wt 65.2 kg (143 lb 11.2 oz)   SpO2 99%   BMI 27.15 kg/m    BMI: Body mass index is 27.15 kg/m .  Wt Readings from Last 3 Encounters:   08/26/22 65.2 kg (143 lb 11.2 oz)   08/18/22 62.1 kg (137 lb)       Intake/Output Summary (Last 24 hours) at 8/26/2022 1235  Last data filed at 8/26/2022 1000  Gross per 24 hour   Intake 840 ml   Output 2350 ml   Net -1510 ml     General Appearance:   no distress, normal body habitus   ENT/Mouth: membranes moist, no oral lesions or bleeding gums.      EYES:  no scleral icterus, normal conjunctivae   Neck: no carotid " bruits or thyromegaly   Chest/Lungs:   lungs are clear to auscultation   Cardiovascular:   irregular       Extremities: no cyanosis or clubbing   Skin: no xanthelasma, warm.    Neurologic: normal  bilateral, no tremors     Psychiatric: alert and oriented x3, calm     Review Of Systems  Skin: negative  Eyes: negative  Ears/Nose/Throat: negative  Respiratory: No shortness of breath, dyspnea on exertion, cough, or hemoptysis  Cardiovascular: negative  Gastrointestinal: negative  Genitourinary: negative  Musculoskeletal: negative  Neurologic: negative  Psychiatric: negative  Hematologic/Lymphatic/Immunologic: negative  Endocrine: negative          Lab Results    Chemistry/lipid CBC Cardiac Enzymes/BNP/TSH/INR   Recent Labs   Lab Test 08/23/22  1802   CHOL 187   HDL 62      TRIG 66     Recent Labs   Lab Test 08/23/22  1802 07/23/20  1038    83     Recent Labs   Lab Test 08/25/22  0353      POTASSIUM 4.0   CHLORIDE 108*   CO2 25   GLC 94   BUN 11   CR 0.69   GFRESTIMATED >90   YEVGENIY 8.7     Recent Labs   Lab Test 08/25/22  0353 08/23/22  1802 08/17/22  1041   CR 0.69 0.85 0.91     Recent Labs   Lab Test 08/24/22  1437   A1C 4.9          Recent Labs   Lab Test 08/24/22  1437   WBC 8.3   HGB 15.5   HCT 45.9   MCV 96        Recent Labs   Lab Test 08/24/22  1437 08/23/22  1802   HGB 15.5 14.3    No results for input(s): TROPONINI in the last 22081 hours.  No results for input(s): BNP, NTBNPI, NTBNP in the last 40589 hours.  Recent Labs   Lab Test 08/17/22  1041   TSH 78.80*     Recent Labs   Lab Test 08/26/22  0641   INR 1.12        Medical History  Surgical History Family History Social History    No past surgical history on file.  Family History   Problem Relation Age of Onset     Cancer Mother         stomach     Cancer Father         prostate     No Known Problems Sister      No Known Problems Daughter      No Known Problems Maternal Grandmother      No Known Problems Maternal Grandfather       No Known Problems Paternal Grandmother      No Known Problems Paternal Grandfather      No Known Problems Maternal Aunt      No Known Problems Paternal Aunt      Hereditary Breast and Ovarian Cancer Syndrome No family hx of      Breast Cancer No family hx of      Colon Cancer No family hx of      Endometrial Cancer No family hx of      Ovarian Cancer No family hx of         Social History     Socioeconomic History     Marital status: Single     Spouse name: Not on file     Number of children: Not on file     Years of education: Not on file     Highest education level: Not on file   Occupational History     Not on file   Tobacco Use     Smoking status: Never Smoker     Smokeless tobacco: Never Used   Substance and Sexual Activity     Alcohol use: Not on file     Drug use: Not on file     Sexual activity: Not on file   Other Topics Concern     Not on file   Social History Narrative     Not on file     Social Determinants of Health     Financial Resource Strain: Not on file   Food Insecurity: Not on file   Transportation Needs: Not on file   Physical Activity: Not on file   Stress: Not on file   Social Connections: Not on file   Intimate Partner Violence: Not on file   Housing Stability: Not on file         Medications  Allergies   No current outpatient medications on file.      No Known Allergies      Leesa Ibanez MD

## 2022-08-26 NOTE — PROGRESS NOTES
[facility]    Medicine Progress Note - Hospitalist Service    Date of Admission:  8/23/2022    Assessment & Plan          #Torrie Meyer is a 57 year old female PMH of hypothyroidism, A. fib with slow ventricular response, admitted on 8/23/2022.     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  Associated thrombus could not be excluded on echo from 8/18/2022.  Coronary angio on 8/25 showed very mild RCA d-se.  CV surgery following and plan on surgery on Monday with Dr. Loredo.    #Cardiomyopathy, likely nonischemic, with decreased LVEF at 40 to 45%, on echo 8/23/2022.  MARII on 8/24 as above.    #Atrial fibrillation with slow ventricular response in 40-50s.  Patient asymptomatic/fibrillation was found accidentally during pre-colonoscopy evaluation.  First-time seen in the clinic by cardiology on 8/18/2022, Dr. Sawant.  [id number] score is 1 due to gender, thus very low risk of stroke.  Baby aspirin recommended by cardiology.  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    #Hypothyroidism, on replacement with levothyroxine.  Most recent TSH 78.8 on 8/17/2022; normal free T4.    #Low normal potassium was 3.5, magnesium 1.7 on admission.  Replacing and monitoring electrolytes..  Cholesterol 187, HDL 62, .    Post cath hypotension. Briefly SBP in 70's. Pt asymptomatic. Hypotension recovered, to 90's 250 ml NS bolus given Pt was seen.    Plan:  Continue telemetry monitoring  Anticoagulation with heparin drip started 8/24, to decrease cardioembolic events, as recommended by cardiology.  Patient was consulted by cardiothoracic surgeon, with plans for surgery on Monday.  Aspirin discontinued per cardiology.  Continue home dose of levothyroxine.  Patient of   Reginaldo.  Monitor and replace electrolytes.  Am lipids.     Diet: Regular Diet Adult  Snacks/Supplements Adult: Ensure Enlive; With Meals    DVT Prophylaxis: Heparin drip  Arreaga Catheter: Not present  Central Lines: None  Cardiac Monitoring: ACTIVE order. Indication: Post- PCI/Angiogram (24 hours)  Code Status: Full Code      Disposition Plan      Expected Discharge Date: 08/31/2022      Destination: home with family  Discharge Comments: Anticipate prolonged hospitalization, due to cardiothoracic surgery expected on 8/29/2023.   Anticipate prolonged hospitalization, due to cardiothoracic surgery expected on 8/29/2023.     The patient's care was discussed with the Bedside Nurse, Patient and Dr. Ibanez. Consultant.    Katerin Mendosa MD  Hospitalist Service  [facility]  Securely message with the Vocera Web Console (learn more here)  Text page via [employer] Paging/Directory     Clinically Significant Risk Factors Present on Admission        _______________________________________________________________    Interval History   Patient complaining of dyspnea with minimal exertion/ambulating in the room.  Loss of feeling of precordial heaviness, which she does not describe as pain.  Chest heart rate down to 30s, with activity/ambulating in the room HR in 70s.  No abdominal pain, nausea, vomiting, cough, dysuria.  Ambulating in the room.    Data reviewed today: I reviewed all medications, new labs and imaging results over the last 24 hours. I personally reviewed    Physical Exam   Vital Signs: Temp: 97.8  F (36.6  C) Temp src: Oral BP: 92/59 Pulse: 58   Resp: 18 SpO2: 99 % O2 Device: None (Room air)    Weight: 143 lbs 11.2 oz  General: Alert and oriented x 3. Not in obvious distress.  HEENT: NC, AT. Neck- supple, No JVP elevation, lymphadenopathy or thyromegaly. Trachea-central.  Chest: Clear to auscultation bilaterally.  Heart: distant S1S2 irreg irregular. No M/R/G.  Abdomen: Soft. NT, ND. No  organomegaly. Bowel sounds- active.  Back: No spine tenderness. No CVA tenderness.  Extremities: No leg swelling. Peripheral pulses 2+ bilaterally.  Neuro: Cranial nerves 1-12 grossly normal. No focal neurological deficit    Data   Recent Labs   Lab 08/26/22  0641 08/25/22  0353 08/24/22  1437 08/23/22  1802   WBC  --   --  8.3 5.8   HGB  --   --  15.5 14.3   MCV  --   --  96 95   PLT  --   --  209 206   INR 1.12  --   --   --    NA  --  140  --  141   POTASSIUM  --  4.0  --  3.5   CHLORIDE  --  108*  --  107   CO2  --  25  --  26   BUN  --  11  --  18   CR  --  0.69  --  0.85   ANIONGAP  --  7  --  8   YEVGENIY  --  8.7  --  9.5   GLC  --  94  --  79     No results found for this or any previous visit (from the past 24 hour(s)).  Medications     heparin 700 Units/hr (08/26/22 0750)       cyanocobalamin  1,000 mcg Oral Daily     levothyroxine  175 mcg Oral QAM AC     magnesium oxide  400 mg Oral BID     melatonin  3 mg Oral At Bedtime     [Held by provider] traZODone  50 mg Oral At Bedtime     Katerin Mendosa MD   Hospital Medicine Service   [phone]   Pager [phone]   [email or web address]

## 2022-08-26 NOTE — PROGRESS NOTES
Care Management Follow Up    Length of Stay (days): 3    Expected Discharge Date: 08/31/2022     Concerns to be Addressed:  Cards following, hep gtt, and cardiac surgery mon     Patient plan of care discussed at interdisciplinary rounds: Yes    Anticipated Discharge Disposition:  Home     Anticipated Discharge Services: Home   Anticipated Discharge DME:  TBD    Education Provided on the Discharge Plan:  Per Care Team  Patient/Family in Agreement with the Plan: Yes     Referrals Placed by CM/SW:  None at this time.    Private pay costs discussed: Not applicable    Additional Information:  Chart reviewed.    Pt. lives in a house with her fiance Esau and her 17 year old son. She is independent with ADLs at baseline and drives and works part time. Likely no discharge needs at this time.  Family to transport at discharge.      Patient still needs further medical workup, with cardiac surgery scheduled for Monday (8/29).    CM will continue to follow plan of care, review recommendations, and assist with any discharge needs anticipated.     Danna He RN

## 2022-08-26 NOTE — PLAN OF CARE
Problem: Plan of Care - These are the overarching goals to be used throughout the patient stay.    Goal: Plan of Care Review/Shift Note  Description: The Plan of Care Review/Shift note should be completed every shift.  The Outcome Evaluation is a brief statement about your assessment that the patient is improving, declining, or no change.  This information will be displayed automatically on your shift note.  Outcome: Ongoing, Progressing   Goal Outcome Evaluation:        Pt is alert and oriented x 3, calm and cooperative. . Denies  pain. No distress, Ra. Lung sounds are clear, VSS this shift, rhythm remains at.fib CVR to SVR rates between  30s-60s. pt remain asymptomatic even with the HR in the  30's. Angiogram site is intact. Heparin still running at 700 unites at this time. Pending Am anti XA result. Standby assist to the bathroom.   Plan: CV surgery is following and plan on surgery on Monday with Dr. Loredo per Leesa Stoner. Last BP this morning was , 114/66 after infusing 500 ml bolus last evening for BP of 78/56. Will continue to monitor pt.

## 2022-08-27 PROBLEM — E83.42 HYPOMAGNESEMIA: Status: ACTIVE | Noted: 2022-08-27

## 2022-08-27 LAB
ANION GAP SERPL CALCULATED.3IONS-SCNC: 7 MMOL/L (ref 5–18)
BUN SERPL-MCNC: 15 MG/DL (ref 8–22)
CALCIUM SERPL-MCNC: 9 MG/DL (ref 8.5–10.5)
CHLORIDE BLD-SCNC: 107 MMOL/L (ref 98–107)
CO2 SERPL-SCNC: 25 MMOL/L (ref 22–31)
CREAT SERPL-MCNC: 0.75 MG/DL (ref 0.6–1.1)
ERYTHROCYTE [DISTWIDTH] IN BLOOD BY AUTOMATED COUNT: 13.2 % (ref 10–15)
GFR SERPL CREATININE-BSD FRML MDRD: >90 ML/MIN/1.73M2
GLUCOSE BLD-MCNC: 79 MG/DL (ref 70–125)
HCT VFR BLD AUTO: 39.8 % (ref 35–47)
HGB BLD-MCNC: 13.8 G/DL (ref 11.7–15.7)
HOLD SPECIMEN: NORMAL
MCH RBC QN AUTO: 33.1 PG (ref 26.5–33)
MCHC RBC AUTO-ENTMCNC: 34.7 G/DL (ref 31.5–36.5)
MCV RBC AUTO: 95 FL (ref 78–100)
PLATELET # BLD AUTO: 167 10E3/UL (ref 150–450)
POTASSIUM BLD-SCNC: 4.2 MMOL/L (ref 3.5–5)
RBC # BLD AUTO: 4.17 10E6/UL (ref 3.8–5.2)
SODIUM SERPL-SCNC: 139 MMOL/L (ref 136–145)
UFH PPP CHRO-ACNC: 0.39 IU/ML
WBC # BLD AUTO: 4.9 10E3/UL (ref 4–11)

## 2022-08-27 PROCEDURE — 36415 COLL VENOUS BLD VENIPUNCTURE: CPT | Performed by: INTERNAL MEDICINE

## 2022-08-27 PROCEDURE — 250N000013 HC RX MED GY IP 250 OP 250 PS 637: Performed by: INTERNAL MEDICINE

## 2022-08-27 PROCEDURE — 86901 BLOOD TYPING SEROLOGIC RH(D): CPT | Performed by: THORACIC SURGERY (CARDIOTHORACIC VASCULAR SURGERY)

## 2022-08-27 PROCEDURE — 80048 BASIC METABOLIC PNL TOTAL CA: CPT | Performed by: INTERNAL MEDICINE

## 2022-08-27 PROCEDURE — 85018 HEMOGLOBIN: CPT | Performed by: INTERNAL MEDICINE

## 2022-08-27 PROCEDURE — 210N000001 HC R&B IMCU HEART CARE

## 2022-08-27 PROCEDURE — 99207 PR NO BILLABLE SERVICE THIS VISIT: CPT | Performed by: INTERNAL MEDICINE

## 2022-08-27 PROCEDURE — 86923 COMPATIBILITY TEST ELECTRIC: CPT | Performed by: THORACIC SURGERY (CARDIOTHORACIC VASCULAR SURGERY)

## 2022-08-27 PROCEDURE — 99232 SBSQ HOSP IP/OBS MODERATE 35: CPT | Performed by: INTERNAL MEDICINE

## 2022-08-27 PROCEDURE — 85520 HEPARIN ASSAY: CPT | Performed by: INTERNAL MEDICINE

## 2022-08-27 PROCEDURE — 250N000013 HC RX MED GY IP 250 OP 250 PS 637: Performed by: STUDENT IN AN ORGANIZED HEALTH CARE EDUCATION/TRAINING PROGRAM

## 2022-08-27 RX ORDER — ALENDRONATE SODIUM 70 MG/1
70 TABLET ORAL WEEKLY
Status: DISCONTINUED | OUTPATIENT
Start: 2022-08-27 | End: 2022-08-29

## 2022-08-27 RX ADMIN — Medication 3 MG: at 20:33

## 2022-08-27 RX ADMIN — Medication 400 MG: at 20:33

## 2022-08-27 RX ADMIN — Medication 1000 MCG: at 08:00

## 2022-08-27 RX ADMIN — OXYCODONE HYDROCHLORIDE 5 MG: 5 TABLET ORAL at 03:47

## 2022-08-27 RX ADMIN — OXYCODONE HYDROCHLORIDE 5 MG: 5 TABLET ORAL at 20:32

## 2022-08-27 RX ADMIN — Medication 400 MG: at 08:00

## 2022-08-27 RX ADMIN — LEVOTHYROXINE SODIUM 175 MCG: 0.03 TABLET ORAL at 06:55

## 2022-08-27 RX ADMIN — ACETAMINOPHEN 650 MG: 325 TABLET ORAL at 20:33

## 2022-08-27 ASSESSMENT — ACTIVITIES OF DAILY LIVING (ADL)
ADLS_ACUITY_SCORE: 18

## 2022-08-27 NOTE — PLAN OF CARE
Problem: Dysrhythmia  Goal: Normalized Cardiac Rhythm  Outcome: Ongoing, Not Progressing     Goal Outcome Evaluation:    Pt afib with SVR. HR 40-60. BP stable. Denies dizziness and pain. Up independently in room.     Heparin at 700 units/hr. Next antiXa in AM.

## 2022-08-27 NOTE — SIGNIFICANT EVENT
"Significant Event Note     Time of event: 8:58 PM August 26, 2022     Description of event:  BP 80s systolic. Down from higher baseline. This happened last evening as well.   Pt asymptomatic.      No hx HF / renal failure that I can see.      BP (!) 83/62 (BP Location: Left arm)   Pulse 67   Temp 98.1  F (36.7  C) (Oral)   Resp 18   Ht 1.549 m (5' 1\")   Wt 65.2 kg (143 lb 11.2 oz)   SpO2 97%   BMI 27.15 kg/m       Plan:  Give 500 ml bolus   RN to call CT surgery team and see if they want anything differently. Appreciate.   Text page overnight hospitalist as well. Appreciate.      Discussed with: bedside nurse     Tavo Ramirez DO     6:25AM  Notified by nursing about sustained bradycardia in 40s, even high 30s. Otherwise blood pressure fine and no s/s reported. Will continue to monitor for now given pt remains clinically stable.   Griffin Woody MD    "

## 2022-08-27 NOTE — PROGRESS NOTES
Chart check: Remains in atrial fibrillation with periods of slow ventricular response are asymptomatic.  Period of low blood pressure asymptomatic last night and responded well to IV normal saline bolus.  No new recommendations at this time.

## 2022-08-27 NOTE — PROGRESS NOTES
[facility]    Medicine Progress Note - Hospitalist Service    Date of Admission:  8/23/2022    Assessment & Plan          #Torrie Meyer is a 57 year old female PMH of hypothyroidism, A. fib with slow ventricular response, admitted on 8/23/2022.     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  Associated thrombus could not be excluded on echo from 8/18/2022.  Coronary angio on 8/25 showed very mild RCA d-se.  Platelets decreased likely, 167 today, down from 206 on admission, on heparin drip.  CV surgery following and plan on surgery on Monday with Dr. Loredo.    #Cardiomyopathy, likely nonischemic, with decreased LVEF at 40 to 45%, on echo 8/23/2022.  MARII on 8/24 as above.  Cholesterol 187, HDL 62, .    #Atrial fibrillation with slow ventricular response in 40-50s.  Patient asymptomatic/fibrillation was found accidentally during pre-colonoscopy evaluation.  First-time seen in the clinic by cardiology on 8/18/2022, Dr. Sawant.  [id number] score is 1 due to gender, thus very low risk of stroke.  Baby aspirin recommended by cardiology.  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    #Hypothyroidism, on replacement with levothyroxine.  Most recent TSH 78.8 on 8/17/2022; normal free T4.    #Hypomagnesemia, low normal potassium was 3.5, magnesium 1.7 on admission.  Replacing and monitoring electrolytes..    Post cath hypotension. Briefly SBP in 70's. Pt asymptomatic. Hypotension recovered, to 90's 250 ml NS bolus given Pt was seen.    Plan:  Continue telemetry monitoring  Anticoagulation with heparin drip started 8/24, to decrease cardioembolic events, as recommended by cardiology.  Patient was consulted by cardiothoracic surgeon, with plans for surgery on  Monday.  Continue home dose of levothyroxine.   Monitor and replace electrolytes.  Administer dose of Fosamax, which patient takes once a week on Saturdays/to bring on supplies, as no Fosamax in hospital inventory.     Diet: Regular Diet Adult  Snacks/Supplements Adult: Ensure Enlive; With Meals    DVT Prophylaxis: Heparin drip  Arreaga Catheter: Not present  Central Lines: None  Cardiac Monitoring: ACTIVE order. Indication: Post- PCI/Angiogram (24 hours)  Code Status: Full Code      Disposition Plan      Expected Discharge Date: 08/31/2022      Destination: home with family  Discharge Comments: blue 8/29   Anticipate prolonged hospitalization, due to cardiothoracic surgery expected on 8/29/2023.     The patient's care was discussed with the Bedside Nurse, Patient and Dr. Ibanez. Consultant.    Katerin Mendosa MD  Hospitalist Service  [facility]  Securely message with the Vocera Web Console (learn more here)  Text page via [employer] Paging/Directory     Clinically Significant Risk Factors Present on Admission        _______________________________________________________________    Interval History   Patient complaining of dyspnea with minimal exertion/ambulating in the room.  Loss of feeling of precordial heaviness, which she does not describe as pain.  Chest heart rate down to 30s, with activity/ambulating in the room HR in 70s.  No abdominal pain, nausea, vomiting, cough, dysuria.  Ambulating in the room.    Data reviewed today: I reviewed all medications, new labs and imaging results over the last 24 hours. I personally reviewed    Physical Exam   Vital Signs: Temp: 98  F (36.7  C) Temp src: Oral BP: 95/58 Pulse: 57   Resp: 16 SpO2: 98 % O2 Device: None (Room air)    Weight: 134 lbs 0 oz  General: Alert and oriented x 3. Not in obvious distress.  HEENT: NC, AT. Neck- supple, No JVP elevation, lymphadenopathy or thyromegaly. Trachea-central.  Chest: Clear to auscultation bilaterally.  Heart:  distant S1S2 irreg irregular. No M/R/G.  Abdomen: Soft. NT, ND. No organomegaly. Bowel sounds- active.  Back: No spine tenderness. No CVA tenderness.  Extremities: No leg swelling. Peripheral pulses 2+ bilaterally.  Neuro: Cranial nerves 1-12 grossly normal. No focal neurological deficit    Data   Recent Labs   Lab 08/27/22  0553 08/27/22  0550 08/26/22  0641 08/25/22  0353 08/24/22  1437 08/23/22  1802   WBC 4.9  --   --   --  8.3 5.8   HGB 13.8  --   --   --  15.5 14.3   MCV 95  --   --   --  96 95     --   --   --  209 206   INR  --   --  1.12  --   --   --    NA  --  139  --  140  --  141   POTASSIUM  --  4.2  --  4.0  --  3.5   CHLORIDE  --  107  --  108*  --  107   CO2  --  25  --  25  --  26   BUN  --  15  --  11  --  18   CR  --  0.75  --  0.69  --  0.85   ANIONGAP  --  7  --  7  --  8   YEVGENIY  --  9.0  --  8.7  --  9.5   GLC  --  79  --  94  --  79     No results found for this or any previous visit (from the past 24 hour(s)).  Medications     heparin 700 Units/hr (08/27/22 1158)       [Held by provider] alendronate  70 mg Oral Weekly     cyanocobalamin  1,000 mcg Oral Daily     levothyroxine  175 mcg Oral QAM AC     magnesium oxide  400 mg Oral BID     melatonin  3 mg Oral At Bedtime     [Held by provider] traZODone  50 mg Oral At Bedtime     Katerin Mendosa MD   Hospital Medicine Service   [phone]   Pager [phone]   [email or web address]

## 2022-08-27 NOTE — PROGRESS NOTES
Trending low BP's throughout day.  Latest BP=83/62.  No S&S.  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  Trending A-Fib with rate control and occasional SVR (40-80's).    Discussed aforementioned with House (Ashley).  New order for LR bolus, 500 ml X1, now.  However, Mat requested that [facility] be asked if that would be appropriate order.  Informed House, I would contact [facility] and get their approval.    Awaiting contact from [facility].   [facility] ([facility]) returning call.  [facility] ok with bolus.

## 2022-08-27 NOTE — PLAN OF CARE
Problem: Dysrhythmia  Goal: Normalized Cardiac Rhythm  Outcome: Ongoing, Not Progressing   Goal Outcome Evaluation:      Cardiac:  Trending A-Fib with SVR.  Rate: 30-60's.  During NOC, trending upper 30's to low 40's.  No S&S observed.  Notified House Resident (Bong).  Informed pt to remain in bed and not to get up without nursing assistance (risk for syncope).  Pt acknowledging information.  BP's initially low.  Restored with bolus (see note).    Respiratory: LS: Clear all fields, bilat.  Rate 16-20, non-labored.  Sat's: Maintaining mid 90's at RA.  Neuro:  WNL.  GI:  Passing flatus.  BS: Present X4 quadrants.  :  Voiding, good UOP (see I/O).  Pain:  C/o intermittent frontal head ache.  Endorses dry sinus passages.  Treated with PRN Tylenol and Oxycodone.  Ambulation:  Up with SBA X1 (A-Fib w/ SVR ).  Plan:  Awaiting cardiothoracic surgery on Monday.

## 2022-08-28 ENCOUNTER — ANESTHESIA EVENT (OUTPATIENT)
Dept: SURGERY | Facility: HOSPITAL | Age: 57
End: 2022-08-28
Payer: COMMERCIAL

## 2022-08-28 LAB
ABO/RH(D): NORMAL
ANTIBODY SCREEN: NEGATIVE
HOLD SPECIMEN: NORMAL
HOLD SPECIMEN: NORMAL
SPECIMEN EXPIRATION DATE: NORMAL
UFH PPP CHRO-ACNC: 0.34 IU/ML

## 2022-08-28 PROCEDURE — 85520 HEPARIN ASSAY: CPT | Performed by: THORACIC SURGERY (CARDIOTHORACIC VASCULAR SURGERY)

## 2022-08-28 PROCEDURE — 99232 SBSQ HOSP IP/OBS MODERATE 35: CPT | Performed by: INTERNAL MEDICINE

## 2022-08-28 PROCEDURE — 250N000013 HC RX MED GY IP 250 OP 250 PS 637: Performed by: INTERNAL MEDICINE

## 2022-08-28 PROCEDURE — 36415 COLL VENOUS BLD VENIPUNCTURE: CPT | Performed by: THORACIC SURGERY (CARDIOTHORACIC VASCULAR SURGERY)

## 2022-08-28 PROCEDURE — 250N000011 HC RX IP 250 OP 636: Performed by: INTERNAL MEDICINE

## 2022-08-28 PROCEDURE — 210N000001 HC R&B IMCU HEART CARE

## 2022-08-28 PROCEDURE — 250N000013 HC RX MED GY IP 250 OP 250 PS 637: Performed by: STUDENT IN AN ORGANIZED HEALTH CARE EDUCATION/TRAINING PROGRAM

## 2022-08-28 PROCEDURE — 99207 PR NO BILLABLE SERVICE THIS VISIT: CPT | Performed by: INTERNAL MEDICINE

## 2022-08-28 RX ADMIN — ACETAMINOPHEN 650 MG: 325 TABLET ORAL at 22:10

## 2022-08-28 RX ADMIN — Medication 1000 MCG: at 08:03

## 2022-08-28 RX ADMIN — Medication 400 MG: at 22:54

## 2022-08-28 RX ADMIN — Medication 400 MG: at 08:03

## 2022-08-28 RX ADMIN — HEPARIN SODIUM 700 UNITS/HR: 10000 INJECTION, SOLUTION INTRAVENOUS at 06:15

## 2022-08-28 RX ADMIN — OXYCODONE HYDROCHLORIDE 5 MG: 5 TABLET ORAL at 22:11

## 2022-08-28 RX ADMIN — LEVOTHYROXINE SODIUM 175 MCG: 0.03 TABLET ORAL at 06:16

## 2022-08-28 RX ADMIN — ACETAMINOPHEN 650 MG: 325 TABLET ORAL at 12:50

## 2022-08-28 ASSESSMENT — ACTIVITIES OF DAILY LIVING (ADL)
ADLS_ACUITY_SCORE: 18

## 2022-08-28 NOTE — PROGRESS NOTES
CVTS Brief Note:    Pt seen and questions answered regarding surgery. Chart reviewed.  Surgery with Dr Loredo 8/29/2022 at 11:30 am    NPO after midnight.    Alessandra Godinez PA-C  Cardiothoracic Surgery  [phone]

## 2022-08-28 NOTE — PROGRESS NOTES
Chart check: Patient prepared for CV surgery tomorrow scheduled for 1130.  CV surgery team stop by today.  Remains in A. fib with periodic slow ventricular response on telemetry.  On heparin drip.  No new recommendations

## 2022-08-28 NOTE — PLAN OF CARE
Problem: Dysrhythmia  Goal: Normalized Cardiac Rhythm  Outcome: Ongoing, Not Progressing     Pt still in A-fib with HR 50-60's. Up independently. C/O a headache of 5/10 requested tylenol and oxy x 1. BP's have been 100/70's. Pt can make needs known.

## 2022-08-28 NOTE — PROGRESS NOTES
[facility]    Medicine Progress Note - Hospitalist Service    Date of Admission:  8/23/2022    Assessment & Plan          #Torrie Meyer is a 57 year old female PMH of hypothyroidism, A. fib with slow ventricular response, admitted on 8/23/2022.     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  Associated thrombus could not be excluded on echo from 8/18/2022.  Coronary angio on 8/25 showed very mild RCA d-se.  Platelets decreased likely, 167 today, down from 206 on admission, on heparin drip.  CV surgery following and plan on surgery on Monday with Dr. Loredo, OR time 11: 30.    #Cardiomyopathy, likely nonischemic, with decreased LVEF at 40 to 45%, on echo 8/23/2022.  MARII on 8/24 as above.  Normal coronaries on angiogram.  Cholesterol 187, HDL 62, .    #Atrial fibrillation with slow ventricular response in 40-50s.  Patient asymptomatic/fibrillation was found accidentally during pre-colonoscopy evaluation.  First-time seen in the clinic by cardiology on 8/18/2022, Dr. Sawant.  [id number] score is 1 due to gender, thus very low risk of stroke.  Baby aspirin recommended by cardiology.  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    #Hypothyroidism, on replacement with levothyroxine.  Most recent TSH 78.8 on 8/17/2022; normal free T4.    #Hypomagnesemia, low normal potassium was 3.5, magnesium 1.7 on admission.  Replacing and monitoring electrolytes..    Post cath hypotension. Briefly SBP in 70's. Pt asymptomatic. Hypotension recovered, to 90's 250 ml NS bolus given Pt was seen.    Plan:  Continue telemetry monitoring  Anticoagulation with heparin drip started 8/24, to decrease cardioembolic events, as recommended by cardiology.  Patient was consulted by  cardiothoracic surgeon, with plans for surgery on Monday.  Continue home dose of levothyroxine.   Monitor and replace electrolytes.  Administer dose of Fosamax, which patient takes once a week on Saturdays/to bring home supplies, as no Fosamax in hospital inventory.     Diet: Regular Diet Adult  Snacks/Supplements Adult: Ensure Enlive; With Meals  NPO per Anesthesia Guidelines for Procedure/Surgery Except for: Meds    DVT Prophylaxis: Heparin drip  Arreaga Catheter: Not present  Central Lines: None  Cardiac Monitoring: ACTIVE order. Indication: Post- PCI/Angiogram (24 hours)  Code Status: Full Code      Disposition Plan     Expected Discharge Date: 08/31/2022      Destination: home with family  Discharge Comments: procedure 8/29   Anticipate prolonged hospitalization, due to cardiothoracic surgery expected on 8/29/2023.     The patient's care was discussed with the Bedside Nurse, Patient and Dr. Ibanez. Consultant.    Katerin Mendosa MD  Hospitalist Service  [facility]  Securely message with the Vocera Web Console (learn more here)  Text page via [employer] Paging/Directory     Clinically Significant Risk Factors Present on Admission   ____________________________________________________________    Interval History   Uneventful night.  Intermittent headaches at night, responding to Tylenol.  Patient asking for oxycodone.  Persistent bradycardia at rest, HR 40-50s, up to 70s with activity-ambulating in the room.  No abdominal pain, nausea, vomiting, cough, dysuria.    Patient occupies her time with making lots of phone calls, reading.    Data reviewed today: I reviewed all medications, new labs and imaging results over the last 24 hours. I personally reviewed    Physical Exam   Vital Signs: Temp: 97.9  F (36.6  C) Temp src: Oral BP: 113/72 Pulse: 68   Resp: 18 SpO2: 99 % O2 Device: None (Room air)    Weight: 130 lbs 14.4 oz  General: Alert and oriented x 3. Not in obvious distress.  HEENT: NC, AT.  Neck- supple, No JVP elevation, lymphadenopathy or thyromegaly. Trachea-central.  Chest: Clear to auscultation bilaterally.  Heart: distant S1S2 irreg irregular. No M/R/G.  Abdomen: Soft. NT, ND. No organomegaly. Bowel sounds- active.  Back: No spine tenderness. No CVA tenderness.  Extremities: No leg swelling. Peripheral pulses 2+ bilaterally.  Neuro: Cranial nerves 1-12 grossly normal. No focal neurological deficit    Data   Recent Labs   Lab 08/27/22  0553 08/27/22  0550 08/26/22  0641 08/25/22  0353 08/24/22  1437 08/23/22  1802   WBC 4.9  --   --   --  8.3 5.8   HGB 13.8  --   --   --  15.5 14.3   MCV 95  --   --   --  96 95     --   --   --  209 206   INR  --   --  1.12  --   --   --    NA  --  139  --  140  --  141   POTASSIUM  --  4.2  --  4.0  --  3.5   CHLORIDE  --  107  --  108*  --  107   CO2  --  25  --  25  --  26   BUN  --  15  --  11  --  18   CR  --  0.75  --  0.69  --  0.85   ANIONGAP  --  7  --  7  --  8   YEVGENIY  --  9.0  --  8.7  --  9.5   GLC  --  79  --  94  --  79     No results found for this or any previous visit (from the past 24 hour(s)).  Medications     heparin Stopped (08/29/22 0530)       [Held by provider] alendronate  70 mg Oral Weekly     cyanocobalamin  1,000 mcg Oral Daily     levothyroxine  175 mcg Oral QAM AC     magnesium oxide  400 mg Oral BID     melatonin  3 mg Oral At Bedtime     [Held by provider] traZODone  50 mg Oral At Bedtime     Katerin Mendosa MD   Hospital Medicine Service   [phone]   Pager [phone]   [email or web address]

## 2022-08-28 NOTE — PLAN OF CARE
Patient initially reported some headache, but was able to fall asleep, had already taken tylenol and oxy for this a few hours prior; this morning she denied pain. Tele A-fib, nani HR mostly in the 40's. Continues with Heparin drip at 700Units/hr; anti-xa recheck tomorrow AM (no rate changes). Patient was inquiring about being informed about the surgery on Monday and accepted explanation that it is more liekly to happen Monday morning possibly in the pre-op area depending on time of surgery and if the surgeons are rounding today considering it is Sunday.     Vitals:    08/27/22 1611 08/27/22 1941 08/28/22 0006 08/28/22 0518   BP: 100/70 100/77 92/57 117/70   BP Location: Left arm Left arm Left arm Left arm   Patient Position:   Supine    Pulse: 56 75 (!) 49 (!) 46   Resp: 16 18 18 18   Temp: 97.9  F (36.6  C) 97.8  F (36.6  C) 97.7  F (36.5  C) 97.6  F (36.4  C)   TempSrc: Oral Oral Oral Oral   SpO2: 100% 100% 96% 98%   Weight:       Height:           Problem: Plan of Care - These are the overarching goals to be used throughout the patient stay.    Goal: Optimal Comfort and Wellbeing  Outcome: Ongoing, Progressing  Intervention: Monitor Pain and Promote Comfort  Recent Flowsheet Documentation  Taken 8/28/2022 0018 by Latia Castillo RN  Pain Management Interventions: (already took tylenol; too early for more; fell asleep and wants to sleep because then no pain) rest   Goal Outcome Evaluation:

## 2022-08-28 NOTE — PLAN OF CARE
Problem: Dysrhythmia  Goal: Normalized Cardiac Rhythm  Outcome: Ongoing, Progressing   Goal Outcome Evaluation:    Tele afib with SVR, HR 50-60. Up independently and ambulated in blanco several times.     Heparin running at 700 units/hour. AntiXa in AM. Per order, hold heparin 6 hours prior to surgery tomorrow. Case time currently scheduled for 1130.

## 2022-08-29 ENCOUNTER — ANESTHESIA (OUTPATIENT)
Dept: SURGERY | Facility: HOSPITAL | Age: 57
End: 2022-08-29
Payer: COMMERCIAL

## 2022-08-29 ENCOUNTER — ANCILLARY PROCEDURE (OUTPATIENT)
Dept: ULTRASOUND IMAGING | Facility: HOSPITAL | Age: 57
End: 2022-08-29
Attending: ANESTHESIOLOGY
Payer: COMMERCIAL

## 2022-08-29 ENCOUNTER — APPOINTMENT (OUTPATIENT)
Dept: RADIOLOGY | Facility: HOSPITAL | Age: 57
End: 2022-08-29
Attending: PHYSICIAN ASSISTANT
Payer: COMMERCIAL

## 2022-08-29 PROBLEM — R06.02 SHORT OF BREATH ON EXERTION: Status: ACTIVE | Noted: 2022-08-29

## 2022-08-29 PROBLEM — I51.89 LEFT ATRIAL MASS: Status: ACTIVE | Noted: 2022-08-29

## 2022-08-29 LAB
ALBUMIN SERPL-MCNC: 3.6 G/DL (ref 3.5–5)
ALP SERPL-CCNC: 36 U/L (ref 45–120)
ALT SERPL W P-5'-P-CCNC: 27 U/L (ref 0–45)
ANION GAP SERPL CALCULATED.3IONS-SCNC: 6 MMOL/L (ref 5–18)
APTT PPP: 42 SECONDS (ref 22–38)
APTT PPP: 87 SECONDS (ref 22–38)
AST SERPL W P-5'-P-CCNC: 47 U/L (ref 0–40)
BASE EXCESS BLDA CALC-SCNC: -3.5 MMOL/L
BASE EXCESS BLDA CALC-SCNC: -4.5 MMOL/L
BASE EXCESS BLDA CALC-SCNC: -7.5 MMOL/L
BILIRUB SERPL-MCNC: 0.8 MG/DL (ref 0–1)
BLD PROD TYP BPU: NORMAL
BLOOD COMPONENT TYPE: NORMAL
BUN SERPL-MCNC: 13 MG/DL (ref 8–22)
CALCIUM SERPL-MCNC: 8 MG/DL (ref 8.5–10.5)
CALCIUM, IONIZED MEASURED: 1.15 MMOL/L (ref 1.11–1.3)
CHLORIDE BLD-SCNC: 114 MMOL/L (ref 98–107)
CO2 SERPL-SCNC: 21 MMOL/L (ref 22–31)
CODING SYSTEM: NORMAL
COHGB MFR BLD: 97.8 % (ref 96–97)
COHGB MFR BLD: 99.1 % (ref 96–97)
COHGB MFR BLD: >100 % (ref 96–97)
CREAT SERPL-MCNC: 0.73 MG/DL (ref 0.6–1.1)
CROSSMATCH: NORMAL
ERYTHROCYTE [DISTWIDTH] IN BLOOD BY AUTOMATED COUNT: 13.2 % (ref 10–15)
ERYTHROCYTE [DISTWIDTH] IN BLOOD BY AUTOMATED COUNT: 13.2 % (ref 10–15)
FIBRINOGEN PPP-MCNC: 190 MG/DL (ref 170–490)
GFR SERPL CREATININE-BSD FRML MDRD: >90 ML/MIN/1.73M2
GLUCOSE BLD-MCNC: 150 MG/DL (ref 70–125)
GLUCOSE BLDC GLUCOMTR-MCNC: 110 MG/DL (ref 70–99)
GLUCOSE BLDC GLUCOMTR-MCNC: 122 MG/DL (ref 70–99)
GLUCOSE BLDC GLUCOMTR-MCNC: 124 MG/DL (ref 70–99)
GLUCOSE BLDC GLUCOMTR-MCNC: 142 MG/DL (ref 70–99)
GLUCOSE BLDC GLUCOMTR-MCNC: 145 MG/DL (ref 70–99)
GLUCOSE BLDC GLUCOMTR-MCNC: 154 MG/DL (ref 70–99)
GLUCOSE BLDC GLUCOMTR-MCNC: 171 MG/DL (ref 70–99)
GLUCOSE BLDC GLUCOMTR-MCNC: 79 MG/DL (ref 70–99)
HCO3 BLD-SCNC: 19 MMOL/L (ref 23–29)
HCO3 BLD-SCNC: 22 MMOL/L (ref 23–29)
HCO3 BLD-SCNC: ABNORMAL MMOL/L
HCT VFR BLD AUTO: 29.6 % (ref 35–47)
HCT VFR BLD AUTO: 38.9 % (ref 35–47)
HGB BLD-MCNC: 10 G/DL (ref 11.7–15.7)
HGB BLD-MCNC: 10.4 G/DL (ref 11.7–15.7)
HGB BLD-MCNC: 13.5 G/DL (ref 11.7–15.7)
HOLD SPECIMEN: NORMAL
HOLD SPECIMEN: NORMAL
INR PPP: 1.39 (ref 0.85–1.15)
INR PPP: 1.44 (ref 0.85–1.15)
ION CA PH 7.4: 1.1 MMOL/L (ref 1.11–1.3)
ISSUE DATE AND TIME: NORMAL
LACTATE SERPL-SCNC: 1.5 MMOL/L (ref 0.7–2)
MAGNESIUM SERPL-MCNC: 2.9 MG/DL (ref 1.8–2.6)
MCH RBC QN AUTO: 32.8 PG (ref 26.5–33)
MCH RBC QN AUTO: 33.3 PG (ref 26.5–33)
MCHC RBC AUTO-ENTMCNC: 34.7 G/DL (ref 31.5–36.5)
MCHC RBC AUTO-ENTMCNC: 35.1 G/DL (ref 31.5–36.5)
MCV RBC AUTO: 95 FL (ref 78–100)
MCV RBC AUTO: 95 FL (ref 78–100)
O2/TOTAL GAS SETTING VFR VENT: 0.3 %
O2/TOTAL GAS SETTING VFR VENT: 0.3 %
O2/TOTAL GAS SETTING VFR VENT: 0.7 %
OXYHGB MFR BLD: 95.9 % (ref 96–97)
OXYHGB MFR BLD: 98 % (ref 96–97)
OXYHGB MFR BLD: >98.5 % (ref 96–97)
PCO2 BLD: 40 MM HG (ref 35–45)
PCO2 BLD: 40 MM HG (ref 35–45)
PCO2 BLD: 44 MM HG (ref 35–45)
PEEP: 5 CM H2O
PH BLD: 7.29 [PH] (ref 7.37–7.44)
PH BLD: 7.32 [PH] (ref 7.37–7.44)
PH BLD: 7.34 [PH] (ref 7.37–7.44)
PH: 7.32 (ref 7.35–7.45)
PHOSPHATE SERPL-MCNC: 2.3 MG/DL (ref 2.5–4.5)
PLATELET # BLD AUTO: 135 10E3/UL (ref 150–450)
PLATELET # BLD AUTO: 156 10E3/UL (ref 150–450)
PO2 BLD: 123 MM HG (ref 80–90)
PO2 BLD: 310 MM HG (ref 80–90)
PO2 BLD: 95 MM HG (ref 80–90)
POTASSIUM BLD-SCNC: 3.1 MMOL/L (ref 3.5–5)
PROT SERPL-MCNC: 5 G/DL (ref 6–8)
PSV (LAB BLOOD GAS): 5 CM H2O
PSV (LAB BLOOD GAS): 5 CM H2O
RATE: 16 RR/MIN
RBC # BLD AUTO: 3.12 10E6/UL (ref 3.8–5.2)
RBC # BLD AUTO: 4.11 10E6/UL (ref 3.8–5.2)
SODIUM SERPL-SCNC: 141 MMOL/L (ref 136–145)
TEMPERATURE: 37 DEGREES C
UFH PPP CHRO-ACNC: 0.31 IU/ML
UNIT ABO/RH: NORMAL
UNIT NUMBER: NORMAL
UNIT STATUS: NORMAL
UNIT TYPE ISBT: 6200
VENTILATION MODE: ABNORMAL
VENTILATOR TIDAL VOLUME: 380 ML
WBC # BLD AUTO: 12 10E3/UL (ref 4–11)
WBC # BLD AUTO: 13.6 10E3/UL (ref 4–11)

## 2022-08-29 PROCEDURE — 85018 HEMOGLOBIN: CPT | Performed by: THORACIC SURGERY (CARDIOTHORACIC VASCULAR SURGERY)

## 2022-08-29 PROCEDURE — 83735 ASSAY OF MAGNESIUM: CPT | Performed by: PHYSICIAN ASSISTANT

## 2022-08-29 PROCEDURE — 94002 VENT MGMT INPAT INIT DAY: CPT

## 2022-08-29 PROCEDURE — 250N000009 HC RX 250: Performed by: THORACIC SURGERY (CARDIOTHORACIC VASCULAR SURGERY)

## 2022-08-29 PROCEDURE — P9045 ALBUMIN (HUMAN), 5%, 250 ML: HCPCS

## 2022-08-29 PROCEDURE — 258N000003 HC RX IP 258 OP 636

## 2022-08-29 PROCEDURE — 82803 BLOOD GASES ANY COMBINATION: CPT

## 2022-08-29 PROCEDURE — 33120 EXC ICAR TUM RESC W/CARD BYP: CPT | Performed by: THORACIC SURGERY (CARDIOTHORACIC VASCULAR SURGERY)

## 2022-08-29 PROCEDURE — 272N000004 HC RX 272: Performed by: THORACIC SURGERY (CARDIOTHORACIC VASCULAR SURGERY)

## 2022-08-29 PROCEDURE — 85730 THROMBOPLASTIN TIME PARTIAL: CPT

## 2022-08-29 PROCEDURE — 258N000003 HC RX IP 258 OP 636: Performed by: ANESTHESIOLOGY

## 2022-08-29 PROCEDURE — 250N000011 HC RX IP 250 OP 636: Performed by: THORACIC SURGERY (CARDIOTHORACIC VASCULAR SURGERY)

## 2022-08-29 PROCEDURE — 272N000001 HC OR GENERAL SUPPLY STERILE: Performed by: THORACIC SURGERY (CARDIOTHORACIC VASCULAR SURGERY)

## 2022-08-29 PROCEDURE — 5A1221Z PERFORMANCE OF CARDIAC OUTPUT, CONTINUOUS: ICD-10-PCS | Performed by: THORACIC SURGERY (CARDIOTHORACIC VASCULAR SURGERY)

## 2022-08-29 PROCEDURE — 02U507Z SUPPLEMENT ATRIAL SEPTUM WITH AUTOLOGOUS TISSUE SUBSTITUTE, OPEN APPROACH: ICD-10-PCS | Performed by: THORACIC SURGERY (CARDIOTHORACIC VASCULAR SURGERY)

## 2022-08-29 PROCEDURE — 84132 ASSAY OF SERUM POTASSIUM: CPT

## 2022-08-29 PROCEDURE — 85027 COMPLETE CBC AUTOMATED: CPT

## 2022-08-29 PROCEDURE — 410N000003 HC PER-PERFUSION 1ST 30 MIN: Performed by: THORACIC SURGERY (CARDIOTHORACIC VASCULAR SURGERY)

## 2022-08-29 PROCEDURE — 88305 TISSUE EXAM BY PATHOLOGIST: CPT | Mod: 26 | Performed by: PATHOLOGY

## 2022-08-29 PROCEDURE — 999N000253 HC STATISTIC WEANING TRIALS

## 2022-08-29 PROCEDURE — 410N000004: Performed by: THORACIC SURGERY (CARDIOTHORACIC VASCULAR SURGERY)

## 2022-08-29 PROCEDURE — 99232 SBSQ HOSP IP/OBS MODERATE 35: CPT | Performed by: INTERNAL MEDICINE

## 2022-08-29 PROCEDURE — 250N000013 HC RX MED GY IP 250 OP 250 PS 637: Performed by: THORACIC SURGERY (CARDIOTHORACIC VASCULAR SURGERY)

## 2022-08-29 PROCEDURE — 999N000157 HC STATISTIC RCP TIME EA 10 MIN

## 2022-08-29 PROCEDURE — 200N000001 HC R&B ICU

## 2022-08-29 PROCEDURE — 83605 ASSAY OF LACTIC ACID: CPT | Performed by: PHYSICIAN ASSISTANT

## 2022-08-29 PROCEDURE — 999N000259 HC STATISTIC EXTUBATION

## 2022-08-29 PROCEDURE — 258N000003 HC RX IP 258 OP 636: Performed by: THORACIC SURGERY (CARDIOTHORACIC VASCULAR SURGERY)

## 2022-08-29 PROCEDURE — 82805 BLOOD GASES W/O2 SATURATION: CPT | Performed by: PHYSICIAN ASSISTANT

## 2022-08-29 PROCEDURE — 82330 ASSAY OF CALCIUM: CPT | Performed by: PHYSICIAN ASSISTANT

## 2022-08-29 PROCEDURE — 999N000141 HC STATISTIC PRE-PROCEDURE NURSING ASSESSMENT: Performed by: THORACIC SURGERY (CARDIOTHORACIC VASCULAR SURGERY)

## 2022-08-29 PROCEDURE — 250N000013 HC RX MED GY IP 250 OP 250 PS 637: Performed by: STUDENT IN AN ORGANIZED HEALTH CARE EDUCATION/TRAINING PROGRAM

## 2022-08-29 PROCEDURE — 88305 TISSUE EXAM BY PATHOLOGIST: CPT | Mod: TC | Performed by: THORACIC SURGERY (CARDIOTHORACIC VASCULAR SURGERY)

## 2022-08-29 PROCEDURE — 85027 COMPLETE CBC AUTOMATED: CPT | Performed by: PHYSICIAN ASSISTANT

## 2022-08-29 PROCEDURE — 82805 BLOOD GASES W/O2 SATURATION: CPT | Performed by: THORACIC SURGERY (CARDIOTHORACIC VASCULAR SURGERY)

## 2022-08-29 PROCEDURE — 250N000011 HC RX IP 250 OP 636: Performed by: ANESTHESIOLOGY

## 2022-08-29 PROCEDURE — 02B70ZK EXCISION OF LEFT ATRIAL APPENDAGE, OPEN APPROACH: ICD-10-PCS | Performed by: THORACIC SURGERY (CARDIOTHORACIC VASCULAR SURGERY)

## 2022-08-29 PROCEDURE — 82805 BLOOD GASES W/O2 SATURATION: CPT | Performed by: INTERNAL MEDICINE

## 2022-08-29 PROCEDURE — 36415 COLL VENOUS BLD VENIPUNCTURE: CPT | Performed by: INTERNAL MEDICINE

## 2022-08-29 PROCEDURE — 250N000009 HC RX 250

## 2022-08-29 PROCEDURE — 85610 PROTHROMBIN TIME: CPT | Performed by: PHYSICIAN ASSISTANT

## 2022-08-29 PROCEDURE — 84100 ASSAY OF PHOSPHORUS: CPT | Performed by: PHYSICIAN ASSISTANT

## 2022-08-29 PROCEDURE — 80053 COMPREHEN METABOLIC PANEL: CPT | Performed by: PHYSICIAN ASSISTANT

## 2022-08-29 PROCEDURE — 02B50ZZ EXCISION OF ATRIAL SEPTUM, OPEN APPROACH: ICD-10-PCS | Performed by: THORACIC SURGERY (CARDIOTHORACIC VASCULAR SURGERY)

## 2022-08-29 PROCEDURE — 85520 HEPARIN ASSAY: CPT | Performed by: INTERNAL MEDICINE

## 2022-08-29 PROCEDURE — 999N000065 XR CHEST PORT 1 VIEW

## 2022-08-29 PROCEDURE — 82330 ASSAY OF CALCIUM: CPT

## 2022-08-29 PROCEDURE — 025T0ZZ DESTRUCTION OF LEFT PULMONARY VEIN, OPEN APPROACH: ICD-10-PCS | Performed by: THORACIC SURGERY (CARDIOTHORACIC VASCULAR SURGERY)

## 2022-08-29 PROCEDURE — 025S0ZZ DESTRUCTION OF RIGHT PULMONARY VEIN, OPEN APPROACH: ICD-10-PCS | Performed by: THORACIC SURGERY (CARDIOTHORACIC VASCULAR SURGERY)

## 2022-08-29 PROCEDURE — 5A1223Z PERFORMANCE OF CARDIAC PACING, CONTINUOUS: ICD-10-PCS | Performed by: THORACIC SURGERY (CARDIOTHORACIC VASCULAR SURGERY)

## 2022-08-29 PROCEDURE — C1713 ANCHOR/SCREW BN/BN,TIS/BN: HCPCS | Performed by: THORACIC SURGERY (CARDIOTHORACIC VASCULAR SURGERY)

## 2022-08-29 PROCEDURE — 999N000009 HC STATISTIC AIRWAY CARE

## 2022-08-29 PROCEDURE — 250N000025 HC SEVOFLURANE, PER MIN: Performed by: THORACIC SURGERY (CARDIOTHORACIC VASCULAR SURGERY)

## 2022-08-29 PROCEDURE — 250N000011 HC RX IP 250 OP 636

## 2022-08-29 PROCEDURE — 85730 THROMBOPLASTIN TIME PARTIAL: CPT | Performed by: PHYSICIAN ASSISTANT

## 2022-08-29 PROCEDURE — 33259 ABLATE ATRIA W/BYPASS ADD-ON: CPT | Performed by: THORACIC SURGERY (CARDIOTHORACIC VASCULAR SURGERY)

## 2022-08-29 PROCEDURE — 250N000009 HC RX 250: Performed by: NURSE PRACTITIONER

## 2022-08-29 PROCEDURE — 85610 PROTHROMBIN TIME: CPT

## 2022-08-29 PROCEDURE — 85384 FIBRINOGEN ACTIVITY: CPT

## 2022-08-29 PROCEDURE — C1898 LEAD, PMKR, OTHER THAN TRANS: HCPCS | Performed by: THORACIC SURGERY (CARDIOTHORACIC VASCULAR SURGERY)

## 2022-08-29 PROCEDURE — 370N000017 HC ANESTHESIA TECHNICAL FEE, PER MIN: Performed by: THORACIC SURGERY (CARDIOTHORACIC VASCULAR SURGERY)

## 2022-08-29 PROCEDURE — 250N000011 HC RX IP 250 OP 636: Performed by: PHYSICIAN ASSISTANT

## 2022-08-29 PROCEDURE — P9045 ALBUMIN (HUMAN), 5%, 250 ML: HCPCS | Performed by: THORACIC SURGERY (CARDIOTHORACIC VASCULAR SURGERY)

## 2022-08-29 PROCEDURE — 250N000013 HC RX MED GY IP 250 OP 250 PS 637: Performed by: PHYSICIAN ASSISTANT

## 2022-08-29 PROCEDURE — 99291 CRITICAL CARE FIRST HOUR: CPT | Mod: FT | Performed by: NURSE PRACTITIONER

## 2022-08-29 PROCEDURE — 250N000009 HC RX 250: Performed by: INTERNAL MEDICINE

## 2022-08-29 PROCEDURE — 360N000080 HC SURGERY LEVEL 7, PER MIN: Performed by: THORACIC SURGERY (CARDIOTHORACIC VASCULAR SURGERY)

## 2022-08-29 PROCEDURE — P9016 RBC LEUKOCYTES REDUCED: HCPCS | Performed by: THORACIC SURGERY (CARDIOTHORACIC VASCULAR SURGERY)

## 2022-08-29 DEVICE — SU STERNAL WIRE SINGLE #6 046-032: Type: IMPLANTABLE DEVICE | Site: STERNUM | Status: FUNCTIONAL

## 2022-08-29 RX ORDER — HYDROMORPHONE HCL IN WATER/PF 6 MG/30 ML
0.4 PATIENT CONTROLLED ANALGESIA SYRINGE INTRAVENOUS
Status: DISCONTINUED | OUTPATIENT
Start: 2022-08-29 | End: 2022-09-01

## 2022-08-29 RX ORDER — NITROGLYCERIN 10 MG/100ML
INJECTION INTRAVENOUS PRN
Status: DISCONTINUED | OUTPATIENT
Start: 2022-08-29 | End: 2022-08-29

## 2022-08-29 RX ORDER — CALCIUM GLUCONATE 20 MG/ML
2 INJECTION, SOLUTION INTRAVENOUS
Status: ACTIVE | OUTPATIENT
Start: 2022-08-29 | End: 2022-09-04

## 2022-08-29 RX ORDER — METHADONE HYDROCHLORIDE 10 MG/ML
INJECTION, SOLUTION INTRAMUSCULAR; INTRAVENOUS; SUBCUTANEOUS
Status: DISCONTINUED
Start: 2022-08-29 | End: 2022-08-29 | Stop reason: HOSPADM

## 2022-08-29 RX ORDER — CALCIUM GLUCONATE 20 MG/ML
1 INJECTION, SOLUTION INTRAVENOUS
Status: DISPENSED | OUTPATIENT
Start: 2022-08-29 | End: 2022-09-04

## 2022-08-29 RX ORDER — ASPIRIN 81 MG/1
162 TABLET, CHEWABLE ORAL DAILY
Status: DISCONTINUED | OUTPATIENT
Start: 2022-08-30 | End: 2022-08-30

## 2022-08-29 RX ORDER — OXYCODONE HYDROCHLORIDE 5 MG/1
10 TABLET ORAL EVERY 4 HOURS PRN
Status: DISCONTINUED | OUTPATIENT
Start: 2022-08-29 | End: 2022-09-04 | Stop reason: HOSPADM

## 2022-08-29 RX ORDER — ACETAMINOPHEN 325 MG/1
650 TABLET ORAL EVERY 4 HOURS PRN
Status: DISCONTINUED | OUTPATIENT
Start: 2022-09-01 | End: 2022-09-04 | Stop reason: HOSPADM

## 2022-08-29 RX ORDER — ONDANSETRON 2 MG/ML
4 INJECTION INTRAMUSCULAR; INTRAVENOUS EVERY 6 HOURS PRN
Status: DISCONTINUED | OUTPATIENT
Start: 2022-08-29 | End: 2022-09-04 | Stop reason: HOSPADM

## 2022-08-29 RX ORDER — HEPARIN SODIUM 1000 [USP'U]/ML
INJECTION, SOLUTION INTRAVENOUS; SUBCUTANEOUS PRN
Status: DISCONTINUED | OUTPATIENT
Start: 2022-08-29 | End: 2022-08-29

## 2022-08-29 RX ORDER — CHLORHEXIDINE GLUCONATE ORAL RINSE 1.2 MG/ML
15 SOLUTION DENTAL EVERY 12 HOURS
Status: DISCONTINUED | OUTPATIENT
Start: 2022-08-29 | End: 2022-08-30 | Stop reason: CLARIF

## 2022-08-29 RX ORDER — DEXTROSE MONOHYDRATE 25 G/50ML
25-50 INJECTION, SOLUTION INTRAVENOUS
Status: DISCONTINUED | OUTPATIENT
Start: 2022-08-29 | End: 2022-09-04 | Stop reason: HOSPADM

## 2022-08-29 RX ORDER — POLYETHYLENE GLYCOL 3350 17 G/17G
17 POWDER, FOR SOLUTION ORAL DAILY
Status: DISCONTINUED | OUTPATIENT
Start: 2022-08-30 | End: 2022-09-04 | Stop reason: HOSPADM

## 2022-08-29 RX ORDER — DEXMEDETOMIDINE HYDROCHLORIDE 4 UG/ML
0.2-1.2 INJECTION, SOLUTION INTRAVENOUS CONTINUOUS
Status: DISCONTINUED | OUTPATIENT
Start: 2022-08-29 | End: 2022-08-29 | Stop reason: HOSPADM

## 2022-08-29 RX ORDER — NALOXONE HYDROCHLORIDE 0.4 MG/ML
0.2 INJECTION, SOLUTION INTRAMUSCULAR; INTRAVENOUS; SUBCUTANEOUS
Status: DISCONTINUED | OUTPATIENT
Start: 2022-08-29 | End: 2022-08-29

## 2022-08-29 RX ORDER — PROPOFOL 10 MG/ML
INJECTION, EMULSION INTRAVENOUS PRN
Status: DISCONTINUED | OUTPATIENT
Start: 2022-08-29 | End: 2022-08-29

## 2022-08-29 RX ORDER — MUPIROCIN 20 MG/G
0.5 OINTMENT TOPICAL 2 TIMES DAILY
Status: DISCONTINUED | OUTPATIENT
Start: 2022-08-29 | End: 2022-08-29 | Stop reason: CLARIF

## 2022-08-29 RX ORDER — HEPARIN SODIUM 5000 [USP'U]/.5ML
5000 INJECTION, SOLUTION INTRAVENOUS; SUBCUTANEOUS EVERY 8 HOURS
Status: DISCONTINUED | OUTPATIENT
Start: 2022-08-30 | End: 2022-09-03

## 2022-08-29 RX ORDER — SODIUM CHLORIDE 9 MG/ML
INJECTION, SOLUTION INTRAVENOUS CONTINUOUS PRN
Status: DISCONTINUED | OUTPATIENT
Start: 2022-08-29 | End: 2022-08-29

## 2022-08-29 RX ORDER — NALOXONE HYDROCHLORIDE 0.4 MG/ML
0.4 INJECTION, SOLUTION INTRAMUSCULAR; INTRAVENOUS; SUBCUTANEOUS
Status: DISCONTINUED | OUTPATIENT
Start: 2022-08-29 | End: 2022-08-29

## 2022-08-29 RX ORDER — VANCOMYCIN HYDROCHLORIDE 1 G/20ML
INJECTION, POWDER, LYOPHILIZED, FOR SOLUTION INTRAVENOUS PRN
Status: DISCONTINUED | OUTPATIENT
Start: 2022-08-29 | End: 2022-08-29 | Stop reason: HOSPADM

## 2022-08-29 RX ORDER — MULTIVIT WITH MINERALS/LUTEIN
250 TABLET ORAL DAILY
Status: DISCONTINUED | OUTPATIENT
Start: 2022-08-30 | End: 2022-09-04 | Stop reason: HOSPADM

## 2022-08-29 RX ORDER — METHADONE HYDROCHLORIDE 10 MG/ML
15 INJECTION, SOLUTION INTRAMUSCULAR; INTRAVENOUS; SUBCUTANEOUS ONCE
Status: COMPLETED | OUTPATIENT
Start: 2022-08-29 | End: 2022-08-29

## 2022-08-29 RX ORDER — NICOTINE POLACRILEX 4 MG
15-30 LOZENGE BUCCAL
Status: DISCONTINUED | OUTPATIENT
Start: 2022-08-29 | End: 2022-09-04 | Stop reason: HOSPADM

## 2022-08-29 RX ORDER — NOREPINEPHRINE BITARTRATE 0.02 MG/ML
.01-.1 INJECTION, SOLUTION INTRAVENOUS CONTINUOUS
Status: DISCONTINUED | OUTPATIENT
Start: 2022-08-29 | End: 2022-08-29 | Stop reason: HOSPADM

## 2022-08-29 RX ORDER — CEFAZOLIN SODIUM/WATER 2 G/20 ML
2 SYRINGE (ML) INTRAVENOUS SEE ADMIN INSTRUCTIONS
Status: DISCONTINUED | OUTPATIENT
Start: 2022-08-29 | End: 2022-08-29 | Stop reason: HOSPADM

## 2022-08-29 RX ORDER — HYDROMORPHONE HCL IN WATER/PF 6 MG/30 ML
0.2 PATIENT CONTROLLED ANALGESIA SYRINGE INTRAVENOUS
Status: DISCONTINUED | OUTPATIENT
Start: 2022-08-29 | End: 2022-09-01

## 2022-08-29 RX ORDER — NALOXONE HYDROCHLORIDE 0.4 MG/ML
0.4 INJECTION, SOLUTION INTRAMUSCULAR; INTRAVENOUS; SUBCUTANEOUS
Status: DISCONTINUED | OUTPATIENT
Start: 2022-08-29 | End: 2022-09-04 | Stop reason: HOSPADM

## 2022-08-29 RX ORDER — PHENYLEPHRINE HCL IN 0.9% NACL 50MG/250ML
.1-4 PLASTIC BAG, INJECTION (ML) INTRAVENOUS CONTINUOUS PRN
Status: DISCONTINUED | OUTPATIENT
Start: 2022-08-29 | End: 2022-09-01

## 2022-08-29 RX ORDER — PHENYLEPHRINE HCL IN 0.9% NACL 50MG/250ML
.1-6 PLASTIC BAG, INJECTION (ML) INTRAVENOUS CONTINUOUS
Status: DISCONTINUED | OUTPATIENT
Start: 2022-08-29 | End: 2022-08-29 | Stop reason: HOSPADM

## 2022-08-29 RX ORDER — ALENDRONATE SODIUM 70 MG/1
70 TABLET ORAL WEEKLY
Status: DISCONTINUED | OUTPATIENT
Start: 2022-09-03 | End: 2022-08-29 | Stop reason: CLARIF

## 2022-08-29 RX ORDER — MULTIPLE VITAMINS W/ MINERALS TAB 9MG-400MCG
1 TAB ORAL DAILY
Status: DISCONTINUED | OUTPATIENT
Start: 2022-08-30 | End: 2022-09-04 | Stop reason: HOSPADM

## 2022-08-29 RX ORDER — LIDOCAINE HYDROCHLORIDE 20 MG/ML
INJECTION, SOLUTION INFILTRATION; PERINEURAL PRN
Status: DISCONTINUED | OUTPATIENT
Start: 2022-08-29 | End: 2022-08-29

## 2022-08-29 RX ORDER — CEFAZOLIN SODIUM/WATER 2 G/20 ML
2 SYRINGE (ML) INTRAVENOUS
Status: COMPLETED | OUTPATIENT
Start: 2022-08-29 | End: 2022-08-29

## 2022-08-29 RX ORDER — NALOXONE HYDROCHLORIDE 0.4 MG/ML
0.2 INJECTION, SOLUTION INTRAMUSCULAR; INTRAVENOUS; SUBCUTANEOUS
Status: DISCONTINUED | OUTPATIENT
Start: 2022-08-29 | End: 2022-09-04 | Stop reason: HOSPADM

## 2022-08-29 RX ORDER — OXYCODONE HYDROCHLORIDE 5 MG/1
5 TABLET ORAL EVERY 4 HOURS PRN
Status: DISCONTINUED | OUTPATIENT
Start: 2022-08-29 | End: 2022-09-04 | Stop reason: HOSPADM

## 2022-08-29 RX ORDER — LIDOCAINE 40 MG/G
CREAM TOPICAL
Status: DISCONTINUED | OUTPATIENT
Start: 2022-08-29 | End: 2022-09-04 | Stop reason: HOSPADM

## 2022-08-29 RX ORDER — LIDOCAINE 40 MG/G
CREAM TOPICAL
Status: DISCONTINUED | OUTPATIENT
Start: 2022-08-29 | End: 2022-08-29 | Stop reason: HOSPADM

## 2022-08-29 RX ORDER — DEXMEDETOMIDINE HYDROCHLORIDE 4 UG/ML
.2-.7 INJECTION, SOLUTION INTRAVENOUS CONTINUOUS
Status: DISCONTINUED | OUTPATIENT
Start: 2022-08-29 | End: 2022-08-30

## 2022-08-29 RX ORDER — MAGNESIUM HYDROXIDE 1200 MG/15ML
LIQUID ORAL PRN
Status: DISCONTINUED | OUTPATIENT
Start: 2022-08-29 | End: 2022-08-29 | Stop reason: HOSPADM

## 2022-08-29 RX ORDER — PROTAMINE SULFATE 10 MG/ML
INJECTION, SOLUTION INTRAVENOUS PRN
Status: DISCONTINUED | OUTPATIENT
Start: 2022-08-29 | End: 2022-08-29

## 2022-08-29 RX ORDER — PANTOPRAZOLE SODIUM 40 MG/1
40 TABLET, DELAYED RELEASE ORAL DAILY
Status: DISCONTINUED | OUTPATIENT
Start: 2022-08-29 | End: 2022-09-04 | Stop reason: HOSPADM

## 2022-08-29 RX ORDER — POTASSIUM CHLORIDE 29.8 MG/ML
20 INJECTION INTRAVENOUS
Status: COMPLETED | OUTPATIENT
Start: 2022-08-29 | End: 2022-08-29

## 2022-08-29 RX ORDER — AMOXICILLIN 250 MG
1 CAPSULE ORAL 2 TIMES DAILY
Status: DISCONTINUED | OUTPATIENT
Start: 2022-08-29 | End: 2022-09-04 | Stop reason: HOSPADM

## 2022-08-29 RX ORDER — CHLORHEXIDINE GLUCONATE ORAL RINSE 1.2 MG/ML
15 SOLUTION DENTAL 2 TIMES DAILY
Status: DISCONTINUED | OUTPATIENT
Start: 2022-08-29 | End: 2022-08-29

## 2022-08-29 RX ORDER — MAGNESIUM SULFATE 4 G/50ML
4 INJECTION INTRAVENOUS ONCE
Status: COMPLETED | OUTPATIENT
Start: 2022-08-29 | End: 2022-08-29

## 2022-08-29 RX ORDER — DEXAMETHASONE SODIUM PHOSPHATE 10 MG/ML
INJECTION, SOLUTION INTRAMUSCULAR; INTRAVENOUS PRN
Status: DISCONTINUED | OUTPATIENT
Start: 2022-08-29 | End: 2022-08-29

## 2022-08-29 RX ORDER — CHLORHEXIDINE GLUCONATE ORAL RINSE 1.2 MG/ML
10 SOLUTION DENTAL ONCE
Status: COMPLETED | OUTPATIENT
Start: 2022-08-29 | End: 2022-08-29

## 2022-08-29 RX ORDER — ACETAMINOPHEN 325 MG/1
975 TABLET ORAL EVERY 8 HOURS
Status: DISCONTINUED | OUTPATIENT
Start: 2022-08-29 | End: 2022-09-04 | Stop reason: HOSPADM

## 2022-08-29 RX ORDER — FENTANYL CITRATE 50 UG/ML
INJECTION, SOLUTION INTRAMUSCULAR; INTRAVENOUS PRN
Status: DISCONTINUED | OUTPATIENT
Start: 2022-08-29 | End: 2022-08-29

## 2022-08-29 RX ORDER — ONDANSETRON 4 MG/1
4 TABLET, ORALLY DISINTEGRATING ORAL EVERY 6 HOURS PRN
Status: DISCONTINUED | OUTPATIENT
Start: 2022-08-29 | End: 2022-09-04 | Stop reason: HOSPADM

## 2022-08-29 RX ORDER — BISACODYL 10 MG
10 SUPPOSITORY, RECTAL RECTAL DAILY PRN
Status: DISCONTINUED | OUTPATIENT
Start: 2022-08-29 | End: 2022-09-04 | Stop reason: HOSPADM

## 2022-08-29 RX ORDER — PROCHLORPERAZINE MALEATE 10 MG
10 TABLET ORAL EVERY 6 HOURS PRN
Status: DISCONTINUED | OUTPATIENT
Start: 2022-08-29 | End: 2022-09-04 | Stop reason: HOSPADM

## 2022-08-29 RX ORDER — LANOLIN ALCOHOL/MO/W.PET/CERES
1000 CREAM (GRAM) TOPICAL DAILY
Status: DISCONTINUED | OUTPATIENT
Start: 2022-08-30 | End: 2022-09-04 | Stop reason: HOSPADM

## 2022-08-29 RX ORDER — HYDRALAZINE HYDROCHLORIDE 20 MG/ML
10 INJECTION INTRAMUSCULAR; INTRAVENOUS EVERY 30 MIN PRN
Status: DISCONTINUED | OUTPATIENT
Start: 2022-08-29 | End: 2022-09-04 | Stop reason: HOSPADM

## 2022-08-29 RX ORDER — SODIUM CHLORIDE, SODIUM GLUCONATE, SODIUM ACETATE, POTASSIUM CHLORIDE AND MAGNESIUM CHLORIDE 526; 502; 368; 37; 30 MG/100ML; MG/100ML; MG/100ML; MG/100ML; MG/100ML
1000 INJECTION, SOLUTION INTRAVENOUS
Status: DISCONTINUED | OUTPATIENT
Start: 2022-08-29 | End: 2022-08-29

## 2022-08-29 RX ORDER — CEFAZOLIN SODIUM 1 G/3ML
1 INJECTION, POWDER, FOR SOLUTION INTRAMUSCULAR; INTRAVENOUS EVERY 8 HOURS
Status: COMPLETED | OUTPATIENT
Start: 2022-08-29 | End: 2022-08-30

## 2022-08-29 RX ORDER — VITAMIN B COMPLEX
25 TABLET ORAL DAILY
Status: DISCONTINUED | OUTPATIENT
Start: 2022-08-30 | End: 2022-09-04 | Stop reason: HOSPADM

## 2022-08-29 RX ORDER — ASPIRIN 81 MG/1
81 TABLET, CHEWABLE ORAL ONCE
Status: COMPLETED | OUTPATIENT
Start: 2022-08-29 | End: 2022-08-29

## 2022-08-29 RX ORDER — ONDANSETRON 2 MG/ML
INJECTION INTRAMUSCULAR; INTRAVENOUS PRN
Status: DISCONTINUED | OUTPATIENT
Start: 2022-08-29 | End: 2022-08-29

## 2022-08-29 RX ORDER — SODIUM CHLORIDE, SODIUM LACTATE, POTASSIUM CHLORIDE, CALCIUM CHLORIDE 600; 310; 30; 20 MG/100ML; MG/100ML; MG/100ML; MG/100ML
INJECTION, SOLUTION INTRAVENOUS CONTINUOUS
Status: DISCONTINUED | OUTPATIENT
Start: 2022-08-29 | End: 2022-08-29 | Stop reason: HOSPADM

## 2022-08-29 RX ADMIN — HEPARIN SODIUM 20000 UNITS: 1000 INJECTION INTRAVENOUS; SUBCUTANEOUS at 13:37

## 2022-08-29 RX ADMIN — PHENYLEPHRINE HYDROCHLORIDE 200 MCG: 10 INJECTION INTRAVENOUS at 13:03

## 2022-08-29 RX ADMIN — ALBUMIN (HUMAN) 12.5 G: 12.5 INJECTION, SOLUTION INTRAVENOUS at 16:45

## 2022-08-29 RX ADMIN — Medication 10 MG: at 12:55

## 2022-08-29 RX ADMIN — CEFAZOLIN 1 G: 1 INJECTION, POWDER, FOR SOLUTION INTRAMUSCULAR; INTRAVENOUS at 19:38

## 2022-08-29 RX ADMIN — PHENYLEPHRINE HYDROCHLORIDE 100 MCG: 10 INJECTION INTRAVENOUS at 15:32

## 2022-08-29 RX ADMIN — SODIUM CHLORIDE: 9 INJECTION, SOLUTION INTRAVENOUS at 13:00

## 2022-08-29 RX ADMIN — PHENYLEPHRINE HYDROCHLORIDE 100 MCG: 10 INJECTION INTRAVENOUS at 15:41

## 2022-08-29 RX ADMIN — ALBUMIN (HUMAN) 12.5 G: 12.5 INJECTION, SOLUTION INTRAVENOUS at 21:56

## 2022-08-29 RX ADMIN — MIDAZOLAM 1 MG: 1 INJECTION INTRAMUSCULAR; INTRAVENOUS at 15:02

## 2022-08-29 RX ADMIN — CHLORHEXIDINE GLUCONATE 10 ML: 1.2 SOLUTION ORAL at 09:49

## 2022-08-29 RX ADMIN — ACETAMINOPHEN 650 MG: 325 TABLET ORAL at 06:14

## 2022-08-29 RX ADMIN — AMINOCAPROIC ACID 7.5 G: 250 INJECTION, SOLUTION INTRAVENOUS at 13:02

## 2022-08-29 RX ADMIN — SODIUM CHLORIDE, POTASSIUM CHLORIDE, SODIUM LACTATE AND CALCIUM CHLORIDE: 600; 310; 30; 20 INJECTION, SOLUTION INTRAVENOUS at 10:55

## 2022-08-29 RX ADMIN — SODIUM CHLORIDE, POTASSIUM CHLORIDE, SODIUM LACTATE AND CALCIUM CHLORIDE: 600; 310; 30; 20 INJECTION, SOLUTION INTRAVENOUS at 12:31

## 2022-08-29 RX ADMIN — DEXMEDETOMIDINE HYDROCHLORIDE 0.5 MCG/KG/HR: 400 INJECTION INTRAVENOUS at 13:02

## 2022-08-29 RX ADMIN — MIDAZOLAM 1 MG: 1 INJECTION INTRAMUSCULAR; INTRAVENOUS at 12:40

## 2022-08-29 RX ADMIN — MAGNESIUM SULFATE HEPTAHYDRATE 4 G: 80 INJECTION, SOLUTION INTRAVENOUS at 10:56

## 2022-08-29 RX ADMIN — SODIUM BICARBONATE 100 MEQ: 84 INJECTION, SOLUTION INTRAVENOUS at 22:18

## 2022-08-29 RX ADMIN — PROTAMINE SULFATE 10 MG: 10 INJECTION, SOLUTION INTRAVENOUS at 15:24

## 2022-08-29 RX ADMIN — SODIUM CHLORIDE, POTASSIUM CHLORIDE, SODIUM LACTATE AND CALCIUM CHLORIDE: 600; 310; 30; 20 INJECTION, SOLUTION INTRAVENOUS at 15:08

## 2022-08-29 RX ADMIN — SODIUM BICARBONATE 25 MEQ: 84 INJECTION, SOLUTION INTRAVENOUS at 16:40

## 2022-08-29 RX ADMIN — PROTAMINE SULFATE 20 MG: 10 INJECTION, SOLUTION INTRAVENOUS at 15:26

## 2022-08-29 RX ADMIN — FENTANYL CITRATE 25 MCG: 50 INJECTION, SOLUTION INTRAMUSCULAR; INTRAVENOUS at 12:36

## 2022-08-29 RX ADMIN — FENTANYL CITRATE 50 MCG: 50 INJECTION, SOLUTION INTRAMUSCULAR; INTRAVENOUS at 13:20

## 2022-08-29 RX ADMIN — SUGAMMADEX 150 MG: 100 INJECTION, SOLUTION INTRAVENOUS at 16:34

## 2022-08-29 RX ADMIN — SODIUM BICARBONATE 25 MEQ: 84 INJECTION, SOLUTION INTRAVENOUS at 17:53

## 2022-08-29 RX ADMIN — PROTAMINE SULFATE 50 MG: 10 INJECTION, SOLUTION INTRAVENOUS at 15:27

## 2022-08-29 RX ADMIN — Medication 0.3 MCG/KG/MIN: at 15:19

## 2022-08-29 RX ADMIN — HYDROMORPHONE HYDROCHLORIDE 0.2 MG: 0.2 INJECTION, SOLUTION INTRAMUSCULAR; INTRAVENOUS; SUBCUTANEOUS at 22:57

## 2022-08-29 RX ADMIN — ASPIRIN 81 MG: 81 TABLET, CHEWABLE ORAL at 09:51

## 2022-08-29 RX ADMIN — LIDOCAINE HYDROCHLORIDE 60 MG: 20 INJECTION, SOLUTION INFILTRATION; PERINEURAL at 12:46

## 2022-08-29 RX ADMIN — ALBUMIN (HUMAN) 12.5 G: 12.5 INJECTION, SOLUTION INTRAVENOUS at 18:01

## 2022-08-29 RX ADMIN — PHENYLEPHRINE HYDROCHLORIDE 100 MCG: 10 INJECTION INTRAVENOUS at 12:53

## 2022-08-29 RX ADMIN — PROTAMINE SULFATE 10 MG: 10 INJECTION, SOLUTION INTRAVENOUS at 15:25

## 2022-08-29 RX ADMIN — ONDANSETRON 4 MG: 2 INJECTION INTRAMUSCULAR; INTRAVENOUS at 16:12

## 2022-08-29 RX ADMIN — AMINOCAPROIC ACID 1 G/HR: 250 INJECTION, SOLUTION INTRAVENOUS at 14:04

## 2022-08-29 RX ADMIN — PHENYLEPHRINE HYDROCHLORIDE 100 MCG: 10 INJECTION INTRAVENOUS at 15:35

## 2022-08-29 RX ADMIN — ROCURONIUM BROMIDE 100 MG: 50 INJECTION, SOLUTION INTRAVENOUS at 12:46

## 2022-08-29 RX ADMIN — PHENYLEPHRINE HYDROCHLORIDE 100 MCG: 10 INJECTION INTRAVENOUS at 15:48

## 2022-08-29 RX ADMIN — PROTAMINE SULFATE 10 MG: 10 INJECTION, SOLUTION INTRAVENOUS at 15:23

## 2022-08-29 RX ADMIN — POTASSIUM CHLORIDE 20 MEQ: 400 INJECTION, SOLUTION INTRAVENOUS at 18:39

## 2022-08-29 RX ADMIN — HYDROMORPHONE HYDROCHLORIDE 0.4 MG: 0.2 INJECTION, SOLUTION INTRAMUSCULAR; INTRAVENOUS; SUBCUTANEOUS at 18:51

## 2022-08-29 RX ADMIN — POTASSIUM PHOSPHATE, MONOBASIC AND POTASSIUM PHOSPHATE, DIBASIC 9 MMOL: 224; 236 INJECTION, SOLUTION, CONCENTRATE INTRAVENOUS at 20:27

## 2022-08-29 RX ADMIN — PHENYLEPHRINE HYDROCHLORIDE 200 MCG: 10 INJECTION INTRAVENOUS at 15:22

## 2022-08-29 RX ADMIN — PHENYLEPHRINE HYDROCHLORIDE 200 MCG: 10 INJECTION INTRAVENOUS at 15:20

## 2022-08-29 RX ADMIN — DESMOPRESSIN ACETATE 19.56 MCG: 4 SOLUTION INTRAVENOUS at 15:39

## 2022-08-29 RX ADMIN — ALBUMIN HUMAN: 0.05 INJECTION, SOLUTION INTRAVENOUS at 15:51

## 2022-08-29 RX ADMIN — PHENYLEPHRINE HYDROCHLORIDE 100 MCG: 10 INJECTION INTRAVENOUS at 12:49

## 2022-08-29 RX ADMIN — INSULIN HUMAN 2 UNITS/HR: 1 INJECTION, SOLUTION INTRAVENOUS at 15:38

## 2022-08-29 RX ADMIN — PROPOFOL 170 MG: 10 INJECTION, EMULSION INTRAVENOUS at 12:46

## 2022-08-29 RX ADMIN — EPINEPHRINE 0.03 MCG/KG/MIN: 1 INJECTION INTRAMUSCULAR; INTRAVENOUS; SUBCUTANEOUS at 15:09

## 2022-08-29 RX ADMIN — ROCURONIUM BROMIDE 50 MG: 50 INJECTION, SOLUTION INTRAVENOUS at 13:40

## 2022-08-29 RX ADMIN — FENTANYL CITRATE 25 MCG: 50 INJECTION, SOLUTION INTRAMUSCULAR; INTRAVENOUS at 12:33

## 2022-08-29 RX ADMIN — NITROGLYCERIN 40 MCG: 10 INJECTION INTRAVENOUS at 15:28

## 2022-08-29 RX ADMIN — DEXAMETHASONE SODIUM PHOSPHATE 10 MG: 10 INJECTION, SOLUTION INTRAMUSCULAR; INTRAVENOUS at 12:55

## 2022-08-29 RX ADMIN — Medication 10 MG: at 13:16

## 2022-08-29 RX ADMIN — POTASSIUM CHLORIDE 20 MEQ: 400 INJECTION, SOLUTION INTRAVENOUS at 21:17

## 2022-08-29 RX ADMIN — Medication 2 G: at 12:36

## 2022-08-29 ASSESSMENT — ACTIVITIES OF DAILY LIVING (ADL)
ADLS_ACUITY_SCORE: 18
ADLS_ACUITY_SCORE: 19
ADLS_ACUITY_SCORE: 30
ADLS_ACUITY_SCORE: 18
ADLS_ACUITY_SCORE: 30
ADLS_ACUITY_SCORE: 19
ADLS_ACUITY_SCORE: 18
ADLS_ACUITY_SCORE: 18

## 2022-08-29 ASSESSMENT — ENCOUNTER SYMPTOMS: DYSRHYTHMIAS: 1

## 2022-08-29 NOTE — PLAN OF CARE
Problem: Dysrhythmia  Goal: Normalized Cardiac Rhythm  Outcome: Ongoing, Not Progressing       Problem: Plan of Care - These are the overarching goals to be used throughout the patient stay.    Goal: Optimal Comfort and Wellbeing  Intervention: Monitor Pain and Promote Comfort  Recent Flowsheet Documentation  Taken 8/29/2022 0155 by Apryl Boudreaux RN  Pain Management Interventions: cold applied    Goal Outcome Evaluation:  Patient is alert and oriented. Patient c/o 7/10 headache at the back of head and patient described it as a pressure. Pain started during this hospital admission. Cold pack and prn tylenol effective. Patient has a-fib with hr between 40s-60s. /67 and 105/65. Patient is independent in room and makes needs known. Patient is NPO at midnight. Has a procedure scheduled at 1130. Heparin drip was running at 700 units/hr. Heparin drip stopped at 0545. Patient showered with CHG soap, has new linens, gown, and socks. Jewelry removed and remains at bedside with patient.

## 2022-08-29 NOTE — PROGRESS NOTES
Care Management Follow Up    Length of Stay (days): 6    Expected Discharge Date: 08/31/2022     Concerns to be Addressed:       Patient plan of care discussed at interdisciplinary rounds: Yes    Anticipated Discharge Disposition: Home     Anticipated Discharge Services:  TBD  Anticipated Discharge DME:  TBD    Patient/family educated on Medicare website which has current facility and service quality ratings:    Education Provided on the Discharge Plan:  Per care team  Patient/Family in Agreement with the Plan:  yes    Referrals Placed by CM/SW:    Private pay costs discussed: Not applicable    Additional Information:  Chart review:  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      Pt independent at baseline, resides with her lizz Cifuentes and teenage son.      Care management will assess Pt for discharge needs following surgery (consult order dated 8/30/22).        LILIBETH AndresSW

## 2022-08-29 NOTE — ANESTHESIA PROCEDURE NOTES
Perioperative MARII Procedure Note    Staff -        Anesthesiologist:  Liam Poe MD       Performed By: anesthesiologist  Preanesthesia Checklist:  Patient identified, IV assessed, risks and benefits discussed, monitors and equipment assessed, procedure being performed at surgeon's request and anesthesia consent obtained.    MARII Probe Insertion    Probe Status PRE Insertion: NO obvious damage  Probe type:  Adult 2D  Bite block used:   Oral Airway  Insertion Technique: Jaw Lift  Insertion complications: None obvious  Billing Report:A MARII report is NOT being generated.  Probe Status POST Removal: NO obvious damage  Comments: No issues.  Good views.  VSS.

## 2022-08-29 NOTE — PROGRESS NOTES
RT PROGRESS NOTE    VENT DAY# 1    CURRENT SETTINGS:   Vent Mode: CMV/AC  (Continuous Mandatory Ventilation/ Assist Control)  FiO2 (%): 70 %  Resp Rate (Set): 16 breaths/min  Tidal Volume (Set, mL): 380 mL  PEEP (cm H2O): 5 cmH2O  Resp: 20      PATIENT PARAMETERS:  PIP 14  Pplat:  12  Pmean:  7  Compliance: 63  02 Sats:  100%  BS: clear/ diminished     ETT SIZE 7.5 Secured at 20 cm at teeth/gums    Respiratory Medications: none     NOTE / SHIFT SUMMARY:     16:30- Pt. arrived from OR intubated. ETT 7.5 20@ teeth, VCV 16/380/80%/+5, sats 100%, BS clear/ diminished     17:05-FIO2 decreased to 70% sats 100%    17:25- FIO2 decreased to 50% sats 100%, RR increased to 18      Gabriela Cade, RT

## 2022-08-29 NOTE — ANESTHESIA POSTPROCEDURE EVALUATION
Patient: Torrie FRENCH Pead    Procedure: Procedure(s):  LEFT ATRIAL MXYOMA REMOVAL, ANESTHESIA TRANSESOPHAGEAL ECHCARDIOGRAM,BILATERAL PULMONARY VEIN ISOLATION, EPIAORTIC ULTRASOUND  REMOVAL LEFT ATRIAL APPENDAGE       Anesthesia Type:  General    Note:  Disposition: ICU            ICU Sign Out: Anesthesiologist/ICU physician sign out WAS performed   Postop Pain Control: Uneventful            Sign Out: Well controlled pain   PONV: No   Neuro/Psych: Uneventful            Sign Out: Other neuro status (sedated on vent)   Airway/Respiratory: Uneventful            Sign Out: AIRWAY IN SITU/Resp. Support               Airway in situ/Resp. Support: ETT                 Reason: Planned Pre-op (sedated on vent)   CV/Hemodynamics: Uneventful            Sign Out: Detailed CV status               Blood Pressure: Pressors               Rate/Rhythm: Normal HR (pacer)               Perfusion:  Adequate perfusion indices   Other NRE: NONE   DID A NON-ROUTINE EVENT OCCUR? No    Event details/Postop Comments:  Patient to icu with ambu and monitors.  Vitals stable during transport.  Placed on vent with RT present in ICU.  + BBS.  Vitals stable.  Report given to RN.  Intensivist spoken to and report given.    Precedex gtt running.     Patient reversed.    CXR, sedation, and vent management from this point per primary team.               Last vitals:  Vitals:    08/29/22 0900 08/29/22 1000 08/29/22 1630   BP:  114/71    Pulse: 52 97    Resp:      Temp:      SpO2:   100%       Electronically Signed By: Liam Poe MD  August 29, 2022  4:48 PM

## 2022-08-29 NOTE — ANESTHESIA PROCEDURE NOTES
Airway       Patient location during procedure: OR       Procedure Start/Stop Times: 8/29/2022 12:48 PM  Staff -        CRNA: Kenney Roa APRN CRNA       Performed By: CRNA  Consent for Airway        Urgency: elective  Indications and Patient Condition       Indications for airway management: isela-procedural       Induction type:intravenous       Mask difficulty assessment: 1 - vent by mask    Final Airway Details       Final airway type: endotracheal airway       Successful airway: ETT - single and Single subglottic suction  Endotracheal Airway Details        ETT size (mm): 7.5       Cuffed: yes       Successful intubation technique: video laryngoscopy       VL Blade Size: Glidescope 3       Grade View of Cords: 1       Adjucts: stylet       Position: Right       Measured from: gums/teeth       Secured at (cm): 20       Bite block used: None    Post intubation assessment        Placement verified by: capnometry, equal breath sounds and chest rise        Number of attempts at approach: 1       Number of other approaches attempted: 0       Secured with: silk tape       Ease of procedure: easy       Dentition: Intact and Unchanged    Medication(s) Administered   Medication Administration Time: 8/29/2022 12:48 PM

## 2022-08-29 NOTE — ANESTHESIA PREPROCEDURE EVALUATION
Anesthesia Pre-Procedure Evaluation    Patient: Torrie Meyer   MRN: [id number] : 1965        Procedure : Procedure(s):  LEFT ATRIAL MXYOMA REMOVAL, ANESTHESIA TRANSESOPHAGEAL ECHCARDIOGRAM,  POSSIBLE JURADO IV MAZE PROCEDURE          History reviewed. No pertinent past medical history.   History reviewed. No pertinent surgical history.   No Known Allergies   Social History     Tobacco Use     Smoking status: Never Smoker     Smokeless tobacco: Never Used   Substance Use Topics     Alcohol use: Not on file      Wt Readings from Last 1 Encounters:   22 58.7 kg (129 lb 4.8 oz)        Anesthesia Evaluation            ROS/MED HX  ENT/Pulmonary:  - neg pulmonary ROS     Neurologic:  - neg neurologic ROS     Cardiovascular: Comment: Atrial myxoma      Echo 22  Interpretation Summary     Left ventricular function is decreased. The ejection fraction is 40-45%  (mildly reduced).  The left atrium is moderate to severely dilated.  Increased mitral inflow velocities related to occlusion from left atrial mass.  Leaflets appear normal.  4x4x4 cm mobile inhomogeneous left atrial mass. Appears to be attached to  interatrial septum, most consistent with left atrial myxoma. Cannot exclude  associated thrombus.Extends through mitral valve plane during diastole.  Mildly decreased right ventricular systolic function    (+) -----CHF etiology: nonischmic Last EF: 40-45% date: 22 dysrhythmias, a-fib,     METS/Exercise Tolerance:     Hematologic:  - neg hematologic  ROS     Musculoskeletal:       GI/Hepatic:    (-) esophageal disease   Renal/Genitourinary:  - neg Renal ROS     Endo:     (+) thyroid problem, hypothyroidism,     Psychiatric/Substance Use:  - neg psychiatric ROS     Infectious Disease:  - neg infectious disease ROS     Malignancy:       Other:            Physical Exam    Airway        Mallampati: III   TM distance: > 3 FB   Neck ROM: full   Mouth opening: > 3 cm    Respiratory Devices and Support          Dental       (+) chipped      Cardiovascular   cardiovascular exam normal          Pulmonary   pulmonary exam normal                OUTSIDE LABS:  CBC:   Lab Results   Component Value Date    WBC 4.9 08/27/2022    WBC 8.3 08/24/2022    HGB 13.8 08/27/2022    HGB 15.5 08/24/2022    HCT 39.8 08/27/2022    HCT 45.9 08/24/2022     08/27/2022     08/24/2022     BMP:   Lab Results   Component Value Date     08/27/2022     08/25/2022    POTASSIUM 4.2 08/27/2022    POTASSIUM 4.0 08/25/2022    CHLORIDE 107 08/27/2022    CHLORIDE 108 (H) 08/25/2022    CO2 25 08/27/2022    CO2 25 08/25/2022    BUN 15 08/27/2022    BUN 11 08/25/2022    CR 0.75 08/27/2022    CR 0.69 08/25/2022    GLC 79 08/27/2022    GLC 94 08/25/2022     COAGS:   Lab Results   Component Value Date    PTT 62 (H) 08/26/2022    INR 1.12 08/26/2022     POC: No results found for: BGM, HCG, HCGS  HEPATIC:   Lab Results   Component Value Date    ALBUMIN 5.1 08/17/2022    PROTTOTAL 7.1 08/17/2022    ALT 43 (H) 08/17/2022    AST 37 (H) 08/17/2022    ALKPHOS 64 08/17/2022    BILITOTAL 0.4 08/17/2022     OTHER:   Lab Results   Component Value Date    A1C 4.9 08/24/2022    YEVGENIY 9.0 08/27/2022    MAG 1.7 (L) 08/23/2022    LIPASE 13 04/12/2022    TSH 78.80 (H) 08/17/2022    T4 1.04 08/17/2022    CRP 0.8 (H) 04/12/2022       Anesthesia Plan    ASA Status:  4   NPO Status:  Will be NPO Appropriate at ...    Anesthesia Type: General.     - Airway: ETT   Induction: Intravenous, Propofol.   Maintenance: Balanced.   Techniques and Equipment:     - Airway: Video-Laryngoscope     - Lines/Monitors: 2nd IV, Arterial Line, Central Line, PAC, CVP, MARII, NIRS            MARII Absolute Contra-indication: NONE     - Blood: Blood in Room     Consents    Anesthesia Plan(s) and associated risks, benefits, and realistic alternatives discussed. Questions answered and patient/representative(s) expressed understanding.     - Discussed: Risks, Benefits and Alternatives  for BOTH SEDATION and the PROCEDURE were discussed     - Discussed with:  Patient      - Extended Intubation/Ventilatory Support Discussed: Yes.      - Patient is DNR/DNI Status: No    Use of blood products discussed: Yes.     Postoperative Care    Pain management: Multi-modal analgesia, IV analgesics.   PONV prophylaxis: Ondansetron (or other 5HT-3)     Comments:    Other Comments: GETA    Routine cardiac protocol monitors.    Methadone and mg gtt ordered.  Precedex ordered by surgeon.              Rick Finley MD

## 2022-08-29 NOTE — ANESTHESIA CARE TRANSFER NOTE
Patient: Torrie Meyer    Procedure: Procedure(s):  LEFT ATRIAL MXYOMA REMOVAL, ANESTHESIA TRANSESOPHAGEAL ECHCARDIOGRAM,BILATERAL PULMONARY VEIN ISOLATION, EPIAORTIC ULTRASOUND  REMOVAL LEFT ATRIAL APPENDAGE       Diagnosis: Atrial myxoma [D15.1]  Diagnosis Additional Information: No value filed.    Anesthesia Type:   General     Note:    Oropharynx: endotracheal tube in place and ventilatory support  Level of Consciousness: iatrogenic sedation    Level of Supplemental Oxygen (L/min / FiO2): 90%    Dentition: dentition unchanged  Vital Signs Stable: post-procedure vital signs reviewed and stable  Report to RN Given: handoff report given  Patient transferred to: ICU  Comments: Patient transported to ICU with CRNA and Surgeon. Patient hemodynamically stable with vasopressor and ventilatory support. Report given to ICU RN and MD, all questions answered. CRNA ensured ICU staff was comfortable taking over care of the patient.   ICU Handoff: Call for PAUSE to initiate/utilize ICU HANDOFF, Identified Patient, Identified Responsible Provider, Reviewed the Pertinent Medical History, Discussed Surgical Course, Reviewed Intra-OP Anesthesia Management and Issues during Anesthesia, Set Expectations for Post Procedure Period and Allowed Opportunity for Questions and Acknowledgement of Understanding      Vitals:  Vitals Value Taken Time   /69    Temp 36.9    Pulse 90 08/29/22 1638   Resp 16    SpO2 100 % 08/29/22 1638   Vitals shown include unvalidated device data.    Electronically Signed By: LU Chen CRNA  August 29, 2022  4:39 PM

## 2022-08-29 NOTE — ANESTHESIA PROCEDURE NOTES
Arterial Line Procedure Note    Pre-Procedure   Staff -        Anesthesiologist:  Liam Poe MD       Performed By: anesthesiologist       Location: OR       Pre-Anesthestic Checklist: patient identified, IV checked, risks and benefits discussed, informed consent, monitors and equipment checked, pre-op evaluation and at physician/surgeon's request  Timeout:       Correct Patient: Yes        Correct Procedure: Yes        Correct Site: Yes        Correct Position: Yes   Line Placement:   This line was placed Pre Induction starting at 8/29/2022 12:40 PM and ending at 8/29/2022 12:43 PM  Procedure   Procedure: arterial line       Laterality: left (pt req L side due to R wrist pain)       Insertion Site: radial.  Sterile Prep        Standard elements of sterile barrier followed       Skin prep: Chloraprep  Insertion/Injection        Technique: ultrasound guided        1. Ultrasound was used to evaluate the access site.       2. Artery evaluated via ultrasound for patency/adequacy.       3. Using real-time ultrasound the needle/catheter was observed entering the artery/vein.       4. Permanent image was captured and entered into the patient's record.       5. The visualized structures were anatomically normal.       6. There were no apparent abnormal pathologic findings.       Catheter size: 20G.  Narrative         Secured by: suture       Tegaderm and Biopatch dressing used.       Complications: None apparent,        Arterial waveform: Yes        IBP within 10% of NIBP: Yes   Comments:  Good waveform.  No issues.

## 2022-08-29 NOTE — PROGRESS NOTES
Pt left via cart to [facility]. Sacral mepilex applied, iodine nasal swab, mouth wash rinse complete. Preop shower completed on noc shift. Report given to [facility] RN. All personal belongings sent with pt. SO at bedside.

## 2022-08-29 NOTE — OP NOTE
Procedure Date: 08/29/2022    OPERATING AREA:  Room 8.    PROCEDURES:  1.  Median sternotomy.  2.  Epiaortic ultrasound of the ascending aorta.  3.  Placement on central cardiopulmonary bypass using bicaval cannulation.  4.  Extended transseptal approach and removal of a left atrial myxoma.  5.  Reconstruction of the atrial septum with autologous pericardium.  6.  Bilateral pulmonary vein isolation.  7.  Excision of left atrial appendage.    ATTENDING SURGEON:  Jeniffer Loredo MD    FIRST ASSISTANT:  ST Alicia SAC    ANESTHESIA:  General endotracheal.    SKIN PREP:  Betadine and DuraPrep.    INCISIONS:  Median sternotomy.    DRAINS:  One 32-[place] mediastinal, one 24-[place] Anam drain, left chest.    CULTURE:  None.    SPECIMENS:  1.  Left atrial myxoma.  3.  Left atrial appendage.    CLOSURE:  Routine.    PREOPERATIVE DIAGNOSES:  1.  Easy fatigability.  2.  Slow atrial fibrillation.  3.  Left atrial myxoma.  4.  Normal coronary arteries.    POSTOPERATIVE DIAGNOSES:   1.  Easy fatigability.  2.  Slow atrial fibrillation.  3.  Left atrial myxoma.  4.  Normal coronary arteries.    BRIEF HISTORY:  Ms. Meyer was seen in cardiology clinic and diagnosed with slow atrial fibrillation.  Subsequent echocardiography revealed the presence of a large left atrial myxoma.  Her coronary angiogram revealed normal coronaries.  The above procedures were planned.    FINDINGS AT OPERATION:  By epiaortic ultrasound, the patient's ascending aorta was normal and it was normal to palpation.  Her pulmonary artery pressures were normal by palpation.  Her right and left atria appeared to be somewhat enlarged.  The left atrial appendage was enlarged and excised.  The left atrial myxoma could not be delivered through an incision in the fossa ovalis only; therefore, a longer transseptal approach was done.  It measured 5 cm in its greatest dimension.  It did have an attachment to the atrial septum and this was excised.  The defect  created required closure with an autologous pericardial patch.  The pulmonary veins on either side were unremarkable. There was no thrombus in the left atrial appendage.    DESCRIPTION OF PROCEDURE:  The patient arrived in the operating room and an arterial line was placed.  Satisfactory general endotracheal anesthesia was induced.  An OG tube was inserted followed by a transesophageal echo probe, a central line and a [place]re[place] catheter.  The patient's neck, chest, abdomen, both groins, and lower extremities were prepped and draped in a standard sterile fashion.  A complete median sternotomy was made.  A [place] retractor was inserted.  The pericardium was opened and tented up on pericardial sutures.  The aorta was  from the pulmonary artery.  Epiaortic ultrasound was carried out of the ascending aorta.  Pursestring sutures were placed in the ascending aorta, superior vena cava, and at the cavoatrial junction with the inferior vena cava.  Heparin was administered.  When the ACT was appropriate, cannulation was performed and bicaval cannulation and central cardiopulmonary bypass established.  The aorta was cross clamped and 1200 mL of cold blood cardioplegia was administered in an antegrade fashion.  A good arrest was achieved.  Cold topical saline and slush was applied to the heart intermittently during the period of aortic crossclamp.  Once the heart was arrested, caval snares were placed around the superior vena cava and inferior vena cava and the patient was placed on total cardiopulmonary bypass. Continuous CO2 was administered into the pericardial space.  A right atriotomy was made and stay sutures placed for excellent exposure.  The fossa ovalis appeared to be enlarged.  It was incised along its right side and the mass was seen.  The mass was too large to deliver through this incision; therefore, the incision was extended superiorly for a distance of some 4 cm.  This allowed excellent exposure of the  mass, which was slowly delivered upward and the associated atrial septum excised.  The specimen was sent to pathology as specimen.  The resulting defect in the fossa ovalis was such that it required reconstruction with autologous pericardium.  This was secured superiorly and inferiorly with interrupted sutures of 4-0 Prolene and then a running stitch on either side.  The heart was allowed to fill to demonstrate that in fact, the suture line was hemostatic.  The superior aspect of the septum was then closed in 2 layers and this included the dome of the left atrium using running 4-0 Prolene that was pledgeted on either end.  Again, the heart was allowed to fill to demonstrate that this suture line was hemostatic.  The right atrium was then closed in 2 layers using pledgeted 4-0 Prolene on either end of the suture line.  The patient was taken off total cardiopulmonary bypass.  Throughout the period of aortic crossclamp, approximately every 20 minutes, the patient received a bolus dose of cold blood cardioplegia in an antegrade fashion.  Attention was then turned to performing bilateral pulmonary vein isolation.  The tissue between the right inferior pulmonary vein and the inferior vena cava was taken down using sharp dissection and then using sharp and blunt dissection, the right sided pulmonary veins were mobilized circumferentially.  The AtriCure RF ablation device was then easily passed around the right sided pulmonary veins.  It was fired twice in 2 locations.  Next, attention was turned to the left sided pulmonary veins.  The ligament of Julian was taken down using cautery and then using blunt dissection, the left sided pulmonary veins were mobilized circumferentially.  A Red Christensen catheter was placed around the pulmonary veins and then the AtriCure RF ablation device was passed around the left sided pulmonary veins.  It was fired twice in 2 separate locations.  Attention was then turned to the left atrial  appendage.  It was excised leaving a rim of approximately 4 mm circumferentially.  It was then closed in 2 layers using pledgeted 4-0 Prolene on either end of the suture line.  The heart was allowed to fill and this suture line required reinforcement with 2 pledgeted 4-0 Prolene sutures.  A hot shot was then delivered in an antegrade fashion and de-airing maneuvers were performed.  The aortic crossclamp was released.  The aortic cross-clamp time was one hour and 4 minutes.  The patient did not require defibrillation.  The suture lines were examined and found to be hemostatic.  Ventricular pacing wires were applied and the patient was ventricularly paced at a rate of 90 and then atrial pacing wires were applied that were unipolar and secured in place with 5-0 Prolene.  The patient was then AV sequentially paced.  A period of reperfusion and de-airing ensued.  Gentle ventilation resumed.  When the patient was warm she was started on low-dose epinephrine.  After a while, by transesophageal echo, it was clear that adequate de-airing had taken place.  The inferior vena caval cannula was removed and that site repaired with pledgeted 4-0 Prolene.  The patient was then weaned off cardiopulmonary bypass on both epinephrine and Alex-Synephrine.  Total time on cardiopulmonary bypass was one hour and 28 minutes. The patient was decannulated and the cannulation sites oversewn with 4-0 Prolene.  Protamine was administered to reverse the heparin.  Hemostasis was satisfactory after the addition of DDAVP.  The mediastinum was drained with a 32-[place] chest tube and the right pleural space was drained with a 24-[place] Anam drain.  The sternum was approximated with several #6 stainless steel sternal wires.  The sternotomy wound was irrigated with warm antibiotic-containing solution.  It was closed in 4 layers using 2 layers of running 0 Stratafix, an additional layer of 2-0 Stratafix and a subcuticular stitch of 4-0 Monocryl.   Dermabond was applied to the skin.  The sponge, needle and instrument counts were reported as correct.  The estimated blood loss was 500 mL. Ms. Meyer was brought to the intensive care unit in critical but stable condition.    Jeniffer Loredo MD        D: 2022   T: 2022   MT: bree    Name:     LUISANA MEYER  MRN:      [id number]        Account:        [id number]   :      1965           Procedure Date: 2022     Document: [id number]

## 2022-08-29 NOTE — CONSULTS
ICU Consultation Note:      ASSESSMENT:    Torrie De Leon is a 57 year old female with medical history significant for a fib with slow ventricular rate and hypothyroidism.  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 Loredo. Patient also underwent excision of the left atrial appendage and bilateral pulmonary vein isolation.         PLAN:    Systems to Assess:    Pulmonary     Check ABG and adjust support as indicated, avoid acidotic state    Post-op CXR    Once hemodynamically stable, plan to wake, wean, and extubated      Cardiovascular:    Continuous cardiac monitoring    SBP goal > 90, MAP goal > 65    Goal CI 2-3, CVP goal 8-12     Vasoactive gtts per CV surgery    Temporary pacer wires present, will use in setting of symptomatic arrhythmia:  MUST STAY IN PLACE< [place] UNDERLYING RHYTHM      Neurological:    Sedation with precedex until ready to wean and extubate    Pain control: prn      GI/:    NPO until extubated and fully awake    Arreaga catheter in place for strict and accurate I/O management    GI prophylaxis:  PPI       Renal:    Monitor I/O's    Electrolyte replacements PRN    Avoid/Limit neurotoxic agents    IVF per CV surgery team      Heme/Coag:    Transfusions per CV surgery    Monitor chest tube output hourly; notify CV surgery for excessive output    DVT prophylaxis:  Heparin subcutaneous to begin per CV surgery team       ID:    General precautions    Remove lines and drains when no longer required      Endocrine:    Insulin gtt per ICU protocol      Lines/Drains:  Peripheral IV 08/23/22 Anterior;Right    Right Radial Interventional Procedure Access    Introducer 08/29/22 Internal jugular Right    Urethral Catheter 08/29/22 Non-latex;Temperature probe 16 fr    Chest Tube 1 Mediastinal 32 Cambodian    Chest Tube 2 Right Pleural 24 Cambodian    ETT Cuffed Single     PROVIDER RESTRAINT FOR NON- VIOLENT BEHAVIOR FACE TO FACE EVALUATION    Time of Face to Face  Evaluation (within one hour of initiation): 1700 on 8/29/2022     Patient's Immediate Situation:  Patient demonstrated the following behaviors: risk for pulling at required or needed tubes and lines     Patient's Reaction to the intervention:  Does patient understand the reason for restraint/seclusion? Unable to express     Medical Condition:  Is there any evidence of compromise of Skin integrity, Respiratory, Cardiovascular, Musculoskeletal, Hydration? no    Behavioral Condition:  In consultation with the RN, is there a need to continue this restraint or seclusion? Yes    See Restraint Flowsheet for complete restraint documentation and assessment.    LU Majano CNP        Code Status: FULL    HPI:  Torrie De Leon is a 57 year old female with medical history significant for a fib with slow ventricular rate and hypothyroidism.  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 Loredo. Patient also underwent excision of the left atrial appendage and bilateral pulmonary vein isolation.   Uneventful case.  No SWAN floated, but does have R internal jugular CVC.  A line.  Patient's underlying rhythm is very slow and she must be continually paced at this time.  We are hoping to be able to fast track to extubation.  She is only requiring a small amount of epi at this time, and received some albumin upon arrival to ICU for CVP of 5.  Sign out received from Dr. Loredo and Dr. Poe.         History reviewed. No pertinent past medical history.    Past Surgical History:   Procedure Laterality Date     CV CORONARY ANGIOGRAM N/A 8/25/2022    Procedure: Coronary Angiogram;  Surgeon: Najma Pinedo MD;  [facility] CATH LAB CV        No Known Allergies    Family History   Problem Relation Age of Onset     Cancer Mother         stomach     Cancer Father         prostate     No Known Problems Sister      No Known Problems Daughter      No Known Problems Maternal Grandmother      No  Known Problems Maternal Grandfather      No Known Problems Paternal Grandmother      No Known Problems Paternal Grandfather      No Known Problems Maternal Aunt      No Known Problems Paternal Aunt      Hereditary Breast and Ovarian Cancer Syndrome No family hx of      Breast Cancer No family hx of      Colon Cancer No family hx of      Endometrial Cancer No family hx of      Ovarian Cancer No family hx of          Physical Assessment:  General:  Intubated and sedated .  Lungs:  CTA   Cardiac:  Paced 100%, S1 and S2.  No murmur.   GI:  Hypoactive bowel sounds.  Abdomen soft.    Extremities:  No edema noted.   Pulses:  +2 BUE, +2 BLE.   Neuro:  Unable to access, patient is intubated and sedated.             Intake/Output Summary (Last 24 hours) at 8/29/2022 1532  Last data filed at 8/29/2022 1508  Gross per 24 hour   Intake 2750 ml   Output 2450 ml   Net 300 ml           Lab Results   Component Value Date    WBC 4.9 08/27/2022     Lab Results   Component Value Date    RBC 4.17 08/27/2022     Lab Results   Component Value Date    HGB 13.8 08/27/2022     Lab Results   Component Value Date    HCT 39.8 08/27/2022     No components found for: MCT  Lab Results   Component Value Date    MCV 95 08/27/2022     Lab Results   Component Value Date    MCH 33.1 08/27/2022     Lab Results   Component Value Date    MCHC 34.7 08/27/2022     Lab Results   Component Value Date    RDW 13.2 08/27/2022     Lab Results   Component Value Date     08/27/2022       Last Comprehensive Metabolic Panel:  Sodium   Date Value Ref Range Status   08/27/2022 139 136 - 145 mmol/L Final     Potassium   Date Value Ref Range Status   08/27/2022 4.2 3.5 - 5.0 mmol/L Final     Chloride   Date Value Ref Range Status   08/27/2022 107 98 - 107 mmol/L Final     Carbon Dioxide (CO2)   Date Value Ref Range Status   08/27/2022 25 22 - 31 mmol/L Final     Anion Gap   Date Value Ref Range Status   08/27/2022 7 5 - 18 mmol/L Final     Glucose   Date Value Ref  Range Status   08/27/2022 79 70 - 125 mg/dL Final     GLUCOSE BY METER POCT   Date Value Ref Range Status   08/29/2022 79 70 - 99 mg/dL Final     Urea Nitrogen   Date Value Ref Range Status   08/27/2022 15 8 - 22 mg/dL Final     Creatinine   Date Value Ref Range Status   08/27/2022 0.75 0.60 - 1.10 mg/dL Final     GFR Estimate   Date Value Ref Range Status   08/27/2022 >90 >60 mL/min/1.73m2 Final     Comment:     Effective December 21, 2021 eGFRcr in adults is calculated using the 2021 CKD-EPI creatinine equation which includes age and gender (Caitlin et al., NEJM, DOI: 10.1056/YMUQvu5437829)   07/23/2020 >60 >60 mL/min/1.73m2 Final     Calcium   Date Value Ref Range Status   08/27/2022 9.0 8.5 - 10.5 mg/dL Final               Awaiting post-operative CXR.       LU Newell, CNP  Pulmonary Critical Care  Pager: [phone]      Total Time Billed:  45 minutes of critical care time.  Patient is post-cardiac surgery and remains on mechanical ventilator.  She is at high risk of bleeding and hemodynamic instability in the immediate post-operative period.

## 2022-08-29 NOTE — PRE-PROCEDURE
Patient was seen and examined by me.  Given her slow heart rate I do not think a full Maze is indicated.  We will remove her left atrial myxoma and then do bilateral pulmonary vein isolation and excise her left atrial appendage.  The patient understands that the risks for these procedures include: bleeding, infection, stroke, sternal dehiscence, myocardial infarction, arrhythmias, the need for a pacemaker, embolization of the atrial myxoma, prolonged ventilation, pneumonia, liver/renal failure, aortic dissection, and an operative mortality of 2 percent.  She accepts these risks and is agreeable with the plan of proceeding with surgery later today.

## 2022-08-29 NOTE — PLAN OF CARE
Problem: Dysrhythmia  Goal: Normalized Cardiac Rhythm  Outcome: Ongoing, Progressing   Goal Outcome Evaluation:    Up independently in room and hallway. Showered for procedure- clean linens and gown. A-fib with SVR. HR was at the lowest in the 40's but mostly between 50-60. Hep gtt running at 700 units/hr. Next anti-xa lab draw in the AM. Surgery scheduled for 1130 AM.

## 2022-08-29 NOTE — ANESTHESIA PROCEDURE NOTES
Central Line/PA Catheter Placement    Pre-Procedure   Staff -        Anesthesiologist:  Liam Poe MD       Performed By: anesthesiologist       Location: OR       Pre-Anesthestic Checklist: patient identified, IV checked, site marked, risks and benefits discussed, informed consent, monitors and equipment checked, pre-op evaluation and at physician/surgeon's request  Timeout:       Correct Patient: Yes        Correct Procedure: Yes        Correct Site: Yes        Correct Position: Yes        Correct Laterality: Yes   Line Placement:   This line was placed Post Induction starting at 8/29/2022 12:50 PM and ending at 8/29/2022 12:57 PM    Procedure   Procedure: central line       Laterality: right       Insertion Site: internal jugular.       Patient Position: Trendelenburg and supine  Sterile Prep        All elements of maximal sterile barrier technique followed       Patient Prep/Sterile Barriers: draped, hand hygiene, gloves , hat , mask , draped, gown, sterile gel and probe cover       Skin prep: Chloraprep  Insertion/Injection        Technique: ultrasound guided and Seldinger Technique        1. Ultrasound was used to evaluate the access site.       2. Vein evaluated via ultrasound for patency/adequacy.       3. Using real-time ultrasound the needle/catheter was observed entering the artery/vein.       4. Permanent image was captured and entered into the patient's record.       5. The visualized structures were anatomically normal.       6. There were no apparent abnormal pathologic findings.       Introducer Type: 9 Fr, 2-lumen MAC        Type: Introducer (with hands off)       PA Catheter Type: None  Narrative         Secured by: suture       Tegaderm and Biopatch dressing used.       Complications: None apparent,        blood aspirated from all lumens,        All lumens flushed: Yes       Verification method: Ultrasound and MARII   Comments:  No issues.      Surg req no pac

## 2022-08-29 NOTE — BRIEF OP NOTE
[facility]    Brief Operative Note    Pre-operative diagnosis:  Left atrial myxoma  Slow atrial fibrillation  Post-operative diagnosis:  Same  Procedures:  Median Sternotomy  Epi-aortic US Ascending Aorta  Placement on Central CPB using bicaval cannulation  Extended trans-septal approach and removal of left atrial myxoma with associated atrial septum and reconstruction with autologous pericardium.  Bilateral pulmonary vein isolation  Excision of left atrial appendage  Surgeons: Surgeon(s) and Role:     * Jeniffer Loredo MD - Primary      * Eva Santana STSAC - First Assistant  Anesthesia: General   Estimated Blood Loss: 500 mL from 8/29/2022 12:31 PM to 8/29/2022  4:25 PM  Drains: One 32 Fr mediastinal and one 24 Fr Anam right chest  Specimens:   ID Type Source Tests Collected by Time Destination   1 : Left atrial myxoma Tissue Heart SURGICAL PATHOLOGY EXAM Jeniffer Lroedo MD 8/29/2022  2:12 PM    2 : LEFT ATRIAL APPENDAGE Tissue Heart SURGICAL PATHOLOGY EXAM Jeniffer Loredo MD 8/29/2022  2:59 PM      Findings: Large left atrial myxoma measuring 5 cm in its greatest dimension,  Attached to the atrial septum  Large left atrial appendage  Complications: None  Implants:   Implant Name Type Inv. Item Serial No.  Lot No. LRB No. Used Action   CRESPO STERNAL WIRE SINGLE #6 046-032 - [id number] Wire CRESPO STERNAL WIRE SINGLE #6 046-032  [employer] [place] [id number] N/A 2 Implanted

## 2022-08-29 NOTE — PROGRESS NOTES
[facility]    Medicine Progress Note - Hospitalist Service    Date of Admission:  8/23/2022    Assessment & Plan          #Torrie Meyer is a 57 year old female PMH of hypothyroidism, A. fib with slow ventricular response, admitted on 8/23/2022.     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  Associated thrombus could not be excluded on echo from 8/18/2022.  Coronary angio on 8/25 showed very mild RCA d-se.  Platelets decreased likely, 167 today, down from 206 on admission, on heparin drip.  CV surgery with Dr. Loredo on 8/29.    #Cardiomyopathy, likely nonischemic, with decreased LVEF at 40 to 45%, on echo 8/23/2022.  MARII on 8/24 as above.  Normal coronaries on angiogram.  Cholesterol 187, HDL 62, .    #Atrial fibrillation with slow ventricular response in 40-50s.  Patient asymptomatic/fibrillation was found accidentally during pre-colonoscopy evaluation.  First-time seen in the clinic by cardiology on 8/18/2022, Dr. Sawant.  [id number] score is 1 due to gender, thus very low risk of stroke.  Baby aspirin recommended by cardiology.  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    #Hypothyroidism, on replacement with levothyroxine.  Most recent TSH 78.8 on 8/17/2022; normal free T4.    #Hypomagnesemia, low normal potassium was 3.5, magnesium 1.7 on admission.  Replacing and monitoring electrolytes..    Plan:  Postoperative care, anticoagulation per cardiovascular surgery.  Preoperatively patient was on heparin drip.  Continue home dose of levothyroxine.   Monitor and replace electrolytes.     Diet: Snacks/Supplements Adult: Ensure Enlive; With Meals  NPO per Anesthesia Guidelines for Procedure/Surgery Except for: Meds    DVT Prophylaxis: Heparin drip  [place]  Catheter: PRESENT, indication:    Central Lines: None  Cardiac Monitoring: ACTIVE order. Indication: Open heart surgery (72 hours)  Code Status: Full Code      Disposition Plan      Expected Discharge Date: 08/31/2022      Destination: home with family  Discharge Comments: procedure 8/29   Anticipate prolonged hospitalization, due to cardiothoracic surgery expected on 8/29/2023.     The patient's care was discussed with the Bedside Nurse, Patient and Dr. Ibanez. Consultant.    Katerin Mendosa MD  Hospitalist Service  [facility]  Securely message with the Vocera Web Console (learn more here)  Text page via [facility] Paging/Directory     Clinically Significant Risk Factors Present on Admission   ____________________________________________________________    Interval History   Uneventful night.  Intermittent headaches at night, responding to Tylenol.   Plan for OR/left atrium mass removal today.    Data reviewed today: I reviewed all medications, new labs and imaging results over the last 24 hours. I personally reviewed    Physical Exam   Vital Signs: Temp: 97.7  F (36.5  C) Temp src: Oral BP: 114/71 Pulse: 97   Resp: 20 SpO2: 100 % O2 Device: None (Room air)    Weight: 129 lbs 4.8 oz  General: Alert and oriented x 3. Not in obvious distress.  HEENT: NC, AT. Neck- supple, No JVP elevation, lymphadenopathy or thyromegaly. Trachea-central.  Chest: Clear to auscultation bilaterally.  Heart: distant S1S2 irreg irregular. No M/R/G.  Abdomen: Soft. NT, ND. No organomegaly. Bowel sounds- active.  Back: No spine tenderness. No CVA tenderness.  Extremities: No leg swelling. Peripheral pulses 2+ bilaterally.  Neuro: Cranial nerves 1-12 grossly normal. No focal neurological deficit    Data   Recent Labs   Lab 08/27/22  0553 08/27/22  0550 08/26/22  0641 08/25/22  0353 08/24/22  1437 08/23/22  1802   WBC 4.9  --   --   --  8.3 5.8   HGB 13.8  --   --   --  15.5 14.3   MCV 95  --   --   --  96 95     --    --   --  209 206   INR  --   --  1.12  --   --   --    NA  --  139  --  140  --  141   POTASSIUM  --  4.2  --  4.0  --  3.5   CHLORIDE  --  107  --  108*  --  107   CO2  --  25  --  25  --  26   BUN  --  15  --  11  --  18   CR  --  0.75  --  0.69  --  0.85   ANIONGAP  --  7  --  7  --  8   YEVGENIY  --  9.0  --  8.7  --  9.5   GLC  --  79  --  94  --  79     No results found for this or any previous visit (from the past 24 hour(s)).  Medications     aminocaproic acid (AMICAR) infusion ADULT 1 g/hr (08/29/22 1404)     dexmedetomidine 1 mcg/kg/hr (08/29/22 1337)     EPINEPHrine 0.03 mcg/kg/min (08/29/22 1509)     heparin Stopped (08/29/22 0530)     insulin regular       lactated ringers 100 mL/hr at 08/29/22 1055     niCARdipine       norepinephrine       phenylephrine         [Held by provider] alendronate  70 mg Oral Weekly     cyanocobalamin  1,000 mcg Oral Daily     levothyroxine  175 mcg Oral QAM AC     magnesium oxide  400 mg Oral BID     melatonin  3 mg Oral At Bedtime     [Held by provider] traZODone  50 mg Oral At Bedtime     Katerin Mendosa MD   [facility] Medicine Service   [phone]   Pager [phone]

## 2022-08-30 ENCOUNTER — APPOINTMENT (OUTPATIENT)
Dept: OCCUPATIONAL THERAPY | Facility: HOSPITAL | Age: 57
End: 2022-08-30
Attending: PHYSICIAN ASSISTANT
Payer: COMMERCIAL

## 2022-08-30 ENCOUNTER — APPOINTMENT (OUTPATIENT)
Dept: OCCUPATIONAL THERAPY | Facility: HOSPITAL | Age: 57
End: 2022-08-30
Payer: COMMERCIAL

## 2022-08-30 ENCOUNTER — APPOINTMENT (OUTPATIENT)
Dept: RADIOLOGY | Facility: HOSPITAL | Age: 57
End: 2022-08-30
Attending: PHYSICIAN ASSISTANT
Payer: COMMERCIAL

## 2022-08-30 LAB
ALBUMIN SERPL-MCNC: 3.8 G/DL (ref 3.5–5)
ALP SERPL-CCNC: 27 U/L (ref 45–120)
ALT SERPL W P-5'-P-CCNC: 26 U/L (ref 0–45)
ANION GAP SERPL CALCULATED.3IONS-SCNC: 5 MMOL/L (ref 5–18)
AST SERPL W P-5'-P-CCNC: 56 U/L (ref 0–40)
BASE EXCESS BLDA CALC-SCNC: -0.9 MMOL/L
BASE EXCESS BLDA CALC-SCNC: -4.3 MMOL/L
BILIRUB SERPL-MCNC: 0.6 MG/DL (ref 0–1)
BUN SERPL-MCNC: 13 MG/DL (ref 8–22)
CALCIUM SERPL-MCNC: 7.6 MG/DL (ref 8.5–10.5)
CALCIUM, IONIZED MEASURED: 1.07 MMOL/L (ref 1.11–1.3)
CALCIUM, IONIZED MEASURED: 1.08 MMOL/L (ref 1.11–1.3)
CALCIUM, IONIZED MEASURED: 1.11 MMOL/L (ref 1.11–1.3)
CHLORIDE BLD-SCNC: 116 MMOL/L (ref 98–107)
CO2 SERPL-SCNC: 26 MMOL/L (ref 22–31)
COHGB MFR BLD: 98.2 % (ref 96–97)
COHGB MFR BLD: 99.5 % (ref 96–97)
CREAT SERPL-MCNC: 0.71 MG/DL (ref 0.6–1.1)
ERYTHROCYTE [DISTWIDTH] IN BLOOD BY AUTOMATED COUNT: 13 % (ref 10–15)
FLOW: 2 LPM
FLOW: 2 LPM
GFR SERPL CREATININE-BSD FRML MDRD: >90 ML/MIN/1.73M2
GLUCOSE BLD-MCNC: 120 MG/DL (ref 70–125)
GLUCOSE BLDC GLUCOMTR-MCNC: 115 MG/DL (ref 70–99)
GLUCOSE BLDC GLUCOMTR-MCNC: 118 MG/DL (ref 70–99)
GLUCOSE BLDC GLUCOMTR-MCNC: 125 MG/DL (ref 70–99)
GLUCOSE BLDC GLUCOMTR-MCNC: 129 MG/DL (ref 70–99)
GLUCOSE BLDC GLUCOMTR-MCNC: 144 MG/DL (ref 70–99)
GLUCOSE BLDC GLUCOMTR-MCNC: 144 MG/DL (ref 70–99)
GLUCOSE BLDC GLUCOMTR-MCNC: 145 MG/DL (ref 70–99)
GLUCOSE BLDC GLUCOMTR-MCNC: 151 MG/DL (ref 70–99)
GLUCOSE BLDC GLUCOMTR-MCNC: 153 MG/DL (ref 70–99)
GLUCOSE BLDC GLUCOMTR-MCNC: 198 MG/DL (ref 70–99)
HCO3 BLD-SCNC: 21 MMOL/L (ref 23–29)
HCO3 BLD-SCNC: 24 MMOL/L (ref 23–29)
HCT VFR BLD AUTO: 26.4 % (ref 35–47)
HGB BLD-MCNC: 9.2 G/DL (ref 11.7–15.7)
ION CA PH 7.4: 1.06 MMOL/L (ref 1.11–1.3)
ION CA PH 7.4: 1.08 MMOL/L (ref 1.11–1.3)
ION CA PH 7.4: 1.13 MMOL/L (ref 1.11–1.3)
MAGNESIUM SERPL-MCNC: 1.7 MG/DL (ref 1.8–2.6)
MCH RBC QN AUTO: 33.1 PG (ref 26.5–33)
MCHC RBC AUTO-ENTMCNC: 34.8 G/DL (ref 31.5–36.5)
MCV RBC AUTO: 95 FL (ref 78–100)
OXYHGB MFR BLD: 97.1 % (ref 96–97)
OXYHGB MFR BLD: 97.9 % (ref 96–97)
PCO2 BLD: 41 MM HG (ref 35–45)
PCO2 BLD: 43 MM HG (ref 35–45)
PH BLD: 7.32 [PH] (ref 7.37–7.44)
PH BLD: 7.38 [PH] (ref 7.37–7.44)
PH: 7.39 (ref 7.35–7.45)
PH: 7.4 (ref 7.35–7.45)
PH: 7.44 (ref 7.35–7.45)
PHOSPHATE SERPL-MCNC: 2 MG/DL (ref 2.5–4.5)
PLATELET # BLD AUTO: 128 10E3/UL (ref 150–450)
PO2 BLD: 100 MM HG (ref 80–90)
PO2 BLD: 126 MM HG (ref 80–90)
POTASSIUM BLD-SCNC: 3.8 MMOL/L (ref 3.5–5)
POTASSIUM BLD-SCNC: 4 MMOL/L (ref 3.5–5)
PROT SERPL-MCNC: 4.9 G/DL (ref 6–8)
RBC # BLD AUTO: 2.78 10E6/UL (ref 3.8–5.2)
SODIUM SERPL-SCNC: 147 MMOL/L (ref 136–145)
TEMPERATURE: 37 DEGREES C
TEMPERATURE: 37 DEGREES C
VENTILATION MODE: ABNORMAL
VENTILATION MODE: ABNORMAL
WBC # BLD AUTO: 10.5 10E3/UL (ref 4–11)

## 2022-08-30 PROCEDURE — 200N000001 HC R&B ICU

## 2022-08-30 PROCEDURE — 250N000011 HC RX IP 250 OP 636: Performed by: PHYSICIAN ASSISTANT

## 2022-08-30 PROCEDURE — 99291 CRITICAL CARE FIRST HOUR: CPT | Mod: FT | Performed by: INTERNAL MEDICINE

## 2022-08-30 PROCEDURE — 82330 ASSAY OF CALCIUM: CPT | Performed by: PHYSICIAN ASSISTANT

## 2022-08-30 PROCEDURE — G0463 HOSPITAL OUTPT CLINIC VISIT: HCPCS

## 2022-08-30 PROCEDURE — 999N000157 HC STATISTIC RCP TIME EA 10 MIN

## 2022-08-30 PROCEDURE — 71045 X-RAY EXAM CHEST 1 VIEW: CPT

## 2022-08-30 PROCEDURE — 250N000009 HC RX 250: Performed by: INTERNAL MEDICINE

## 2022-08-30 PROCEDURE — 94799 UNLISTED PULMONARY SVC/PX: CPT

## 2022-08-30 PROCEDURE — 82805 BLOOD GASES W/O2 SATURATION: CPT | Performed by: PHYSICIAN ASSISTANT

## 2022-08-30 PROCEDURE — 250N000011 HC RX IP 250 OP 636: Performed by: THORACIC SURGERY (CARDIOTHORACIC VASCULAR SURGERY)

## 2022-08-30 PROCEDURE — 97166 OT EVAL MOD COMPLEX 45 MIN: CPT | Mod: GO

## 2022-08-30 PROCEDURE — 84132 ASSAY OF SERUM POTASSIUM: CPT | Performed by: PHYSICIAN ASSISTANT

## 2022-08-30 PROCEDURE — 83735 ASSAY OF MAGNESIUM: CPT | Performed by: PHYSICIAN ASSISTANT

## 2022-08-30 PROCEDURE — 250N000013 HC RX MED GY IP 250 OP 250 PS 637: Performed by: PHYSICIAN ASSISTANT

## 2022-08-30 PROCEDURE — 82805 BLOOD GASES W/O2 SATURATION: CPT | Performed by: INTERNAL MEDICINE

## 2022-08-30 PROCEDURE — 250N000009 HC RX 250: Performed by: THORACIC SURGERY (CARDIOTHORACIC VASCULAR SURGERY)

## 2022-08-30 PROCEDURE — 93005 ELECTROCARDIOGRAM TRACING: CPT | Performed by: PHYSICIAN ASSISTANT

## 2022-08-30 PROCEDURE — 84132 ASSAY OF SERUM POTASSIUM: CPT | Performed by: THORACIC SURGERY (CARDIOTHORACIC VASCULAR SURGERY)

## 2022-08-30 PROCEDURE — 84100 ASSAY OF PHOSPHORUS: CPT | Performed by: PHYSICIAN ASSISTANT

## 2022-08-30 PROCEDURE — 85027 COMPLETE CBC AUTOMATED: CPT | Performed by: PHYSICIAN ASSISTANT

## 2022-08-30 PROCEDURE — 97535 SELF CARE MNGMENT TRAINING: CPT | Mod: GO

## 2022-08-30 PROCEDURE — 258N000003 HC RX IP 258 OP 636: Performed by: THORACIC SURGERY (CARDIOTHORACIC VASCULAR SURGERY)

## 2022-08-30 PROCEDURE — 250N000012 HC RX MED GY IP 250 OP 636 PS 637: Performed by: PHYSICIAN ASSISTANT

## 2022-08-30 PROCEDURE — 82330 ASSAY OF CALCIUM: CPT | Performed by: THORACIC SURGERY (CARDIOTHORACIC VASCULAR SURGERY)

## 2022-08-30 RX ORDER — MAGNESIUM SULFATE HEPTAHYDRATE 40 MG/ML
2 INJECTION, SOLUTION INTRAVENOUS ONCE
Status: COMPLETED | OUTPATIENT
Start: 2022-08-30 | End: 2022-08-30

## 2022-08-30 RX ORDER — KETOROLAC TROMETHAMINE 30 MG/ML
30 INJECTION, SOLUTION INTRAMUSCULAR; INTRAVENOUS EVERY 6 HOURS PRN
Status: DISPENSED | OUTPATIENT
Start: 2022-08-30 | End: 2022-09-04

## 2022-08-30 RX ORDER — POTASSIUM CHLORIDE 29.8 MG/ML
20 INJECTION INTRAVENOUS ONCE
Status: COMPLETED | OUTPATIENT
Start: 2022-08-30 | End: 2022-08-30

## 2022-08-30 RX ORDER — LIDOCAINE 4 G/G
2 PATCH TOPICAL
Status: DISCONTINUED | OUTPATIENT
Start: 2022-08-30 | End: 2022-09-04 | Stop reason: HOSPADM

## 2022-08-30 RX ORDER — ASPIRIN 81 MG/1
81 TABLET, CHEWABLE ORAL DAILY
Status: DISCONTINUED | OUTPATIENT
Start: 2022-08-31 | End: 2022-09-01

## 2022-08-30 RX ADMIN — KETOROLAC TROMETHAMINE 30 MG: 30 INJECTION, SOLUTION INTRAMUSCULAR; INTRAVENOUS at 07:48

## 2022-08-30 RX ADMIN — ACETAMINOPHEN 975 MG: 325 TABLET ORAL at 14:01

## 2022-08-30 RX ADMIN — SENNOSIDES AND DOCUSATE SODIUM 1 TABLET: 8.6; 5 TABLET ORAL at 20:13

## 2022-08-30 RX ADMIN — Medication 25 MCG: at 08:14

## 2022-08-30 RX ADMIN — PROCHLORPERAZINE EDISYLATE 10 MG: 5 INJECTION INTRAMUSCULAR; INTRAVENOUS at 14:01

## 2022-08-30 RX ADMIN — INSULIN ASPART 2 UNITS: 100 INJECTION, SOLUTION INTRAVENOUS; SUBCUTANEOUS at 12:31

## 2022-08-30 RX ADMIN — OXYCODONE HYDROCHLORIDE 10 MG: 5 TABLET ORAL at 04:58

## 2022-08-30 RX ADMIN — HEPARIN SODIUM 5000 UNITS: 5000 INJECTION, SOLUTION INTRAVENOUS; SUBCUTANEOUS at 12:15

## 2022-08-30 RX ADMIN — ACETAMINOPHEN 975 MG: 325 TABLET ORAL at 22:12

## 2022-08-30 RX ADMIN — SENNOSIDES AND DOCUSATE SODIUM 1 TABLET: 8.6; 5 TABLET ORAL at 08:03

## 2022-08-30 RX ADMIN — PANTOPRAZOLE SODIUM 40 MG: 40 TABLET, DELAYED RELEASE ORAL at 08:03

## 2022-08-30 RX ADMIN — HEPARIN SODIUM 5000 UNITS: 5000 INJECTION, SOLUTION INTRAVENOUS; SUBCUTANEOUS at 19:48

## 2022-08-30 RX ADMIN — CALCIUM GLUCONATE 1 G: 20 INJECTION, SOLUTION INTRAVENOUS at 06:43

## 2022-08-30 RX ADMIN — B-COMPLEX W/ C & FOLIC ACID TAB 1 TABLET: TAB at 08:03

## 2022-08-30 RX ADMIN — OXYCODONE HYDROCHLORIDE 5 MG: 5 TABLET ORAL at 17:59

## 2022-08-30 RX ADMIN — SODIUM BICARBONATE 50 MEQ: 84 INJECTION, SOLUTION INTRAVENOUS at 01:27

## 2022-08-30 RX ADMIN — SODIUM PHOSPHATE, MONOBASIC, MONOHYDRATE 9 MMOL: 276; 142 INJECTION, SOLUTION INTRAVENOUS at 08:15

## 2022-08-30 RX ADMIN — ONDANSETRON 4 MG: 2 INJECTION INTRAMUSCULAR; INTRAVENOUS at 06:14

## 2022-08-30 RX ADMIN — ACETAMINOPHEN 975 MG: 325 TABLET ORAL at 05:03

## 2022-08-30 RX ADMIN — ASPIRIN 162 MG: 81 TABLET, CHEWABLE ORAL at 08:03

## 2022-08-30 RX ADMIN — POLYETHYLENE GLYCOL 3350 17 G: 17 POWDER, FOR SOLUTION ORAL at 08:00

## 2022-08-30 RX ADMIN — INSULIN ASPART 1 UNITS: 100 INJECTION, SOLUTION INTRAVENOUS; SUBCUTANEOUS at 18:03

## 2022-08-30 RX ADMIN — HYDROMORPHONE HYDROCHLORIDE 0.2 MG: 0.2 INJECTION, SOLUTION INTRAMUSCULAR; INTRAVENOUS; SUBCUTANEOUS at 03:11

## 2022-08-30 RX ADMIN — Medication 250 MG: at 08:03

## 2022-08-30 RX ADMIN — KETOROLAC TROMETHAMINE 30 MG: 30 INJECTION, SOLUTION INTRAMUSCULAR; INTRAVENOUS at 14:01

## 2022-08-30 RX ADMIN — CALCIUM GLUCONATE 1 G: 20 INJECTION, SOLUTION INTRAVENOUS at 14:17

## 2022-08-30 RX ADMIN — CEFAZOLIN 1 G: 1 INJECTION, POWDER, FOR SOLUTION INTRAMUSCULAR; INTRAVENOUS at 03:54

## 2022-08-30 RX ADMIN — LIDOCAINE PATCH 4% 2 PATCH: 40 PATCH TOPICAL at 07:48

## 2022-08-30 RX ADMIN — LEVOTHYROXINE SODIUM 175 MCG: 0.03 TABLET ORAL at 06:43

## 2022-08-30 RX ADMIN — POTASSIUM CHLORIDE 20 MEQ: 29.8 INJECTION, SOLUTION INTRAVENOUS at 01:27

## 2022-08-30 RX ADMIN — MAGNESIUM SULFATE IN WATER 2 G: 40 INJECTION, SOLUTION INTRAVENOUS at 06:42

## 2022-08-30 RX ADMIN — KETOROLAC TROMETHAMINE 30 MG: 30 INJECTION, SOLUTION INTRAMUSCULAR; INTRAVENOUS at 22:12

## 2022-08-30 RX ADMIN — OXYCODONE HYDROCHLORIDE 5 MG: 5 TABLET ORAL at 01:26

## 2022-08-30 RX ADMIN — CEFAZOLIN 1 G: 1 INJECTION, POWDER, FOR SOLUTION INTRAMUSCULAR; INTRAVENOUS at 12:15

## 2022-08-30 RX ADMIN — Medication 1 TABLET: at 08:14

## 2022-08-30 RX ADMIN — Medication 1000 MCG: at 08:03

## 2022-08-30 ASSESSMENT — ACTIVITIES OF DAILY LIVING (ADL)
ADLS_ACUITY_SCORE: 32
ADLS_ACUITY_SCORE: 28
ADLS_ACUITY_SCORE: 32

## 2022-08-30 NOTE — CONSULTS
CLINICAL NUTRITION SERVICES - EDUCATION NOTE    Received diet education consult for s/p cv surgery   Pt in ICU, extubated after midnight.   Pt has been hospitalized for 7 days, up until NPO for surgery, pt was eating well per nursing documentation.  Diet is now Clear liquids.  Will follow for diet advancement, intake and appropriateness for education prior to discharge.

## 2022-08-30 NOTE — PROGRESS NOTES
08/30/22 1031   Quick Adds   Type of Visit Initial Occupational Therapy Evaluation   Living Environment   People in Home child(ninfa), dependent;significant other   Current Living Arrangements house   Home Accessibility stairs to enter home;stairs within home   Number of Stairs, Main Entrance 1   Stair Railings, Main Entrance railings safe and in good condition   Number of Stairs, Within Home, Primary greater than 10 stairs   Stair Railings, Within Home, Primary railings safe and in good condition   Transportation Anticipated family or friend will provide   Living Environment Comments Pt was I w/ADLS and mobility PLOF   Self-Care   Usual Activity Tolerance good   Current Activity Tolerance fair   Equipment Currently Used at Home none   General Information   Onset of Illness/Injury or Date of Surgery 08/23/22   Referring Physician Dr Loredo   Existing Precautions/Restrictions cardiac;sternal   Cognitive Status Examination   Affect/Mental Status (Cognitive) WNL   Follows Commands WNL   Bed Mobility   Comment (Bed Mobility) N/T   Transfers   Transfers sit-stand transfer   Sit-Stand Transfer   Sit-Stand [place] (Transfers) contact guard   Sit/Stand Transfer Comments cues for sternal precautions, assist to scoot edge of chair   Clinical Impression   Criteria for Skilled Therapeutic Interventions Met (OT) Yes, treatment indicated   OT Diagnosis decreased activity tolerance   OT Problem List-Impairments impacting ADL post-surgical precautions;pain;strength;activity tolerance impaired   Assessment of Occupational Performance 3-5 Performance Deficits   OT Discharge Planning   OT Discharge Recommendation (DC Rec) home with outpatient cardiac rehab   Total Evaluation Time (Minutes)   Total Evaluation Time (Minutes) 10   OT Goals   Therapy Frequency (OT) 2 times/day   OT Predicted Duration/Target Date for Goal Attainment 09/06/22   OT Goals Cardiac Phase 1   OT: Understanding of cardiac education to maximize quality  of life, condition management, and health outcomes Patient   OT: Perform aerobic activity with stable cardiovascular response 10 minutes   OT: Functional/aerobic ambulation tolerance with stable cardiovascular response in order to return to home and community environment Supervision/SBA;Greater than 300 feet   OT: Navigation of stairs simulating home set up with stable cardiovascular response in order to return to home and community environment Greater than 10 stairs

## 2022-08-30 NOTE — PLAN OF CARE
[facility] - ICU    RN Progress Note:            Pertinent Assessments:      Please refer to flowsheet rows for full assessment     100% AV paced. Leave setting per Dr. Loredo. She tried to lower down the rate but BP dropped.         Mobility Level:     3         Key Events - This Shift:     Extubated at 23:58 to 2lpm/NC. Repeat ABG post extubation had  ph of 7.32 and Bicarbonate of 21. Gave 1 amp Bicarb and repeat ABG this morning is normal.Titrating Alex to keep MAP>65. Maintained Epi at 0.02 mcg/kg/min per Dr. Loredo.Replacing low Mag,Ionized calcium and Phos per protocol.              Plan:     Increase activity, advance diet as tolerated         Point of Contact Update

## 2022-08-30 NOTE — PROGRESS NOTES
HMS  Patient remain critically ill requiring vasopressors.  Critical care and CV surgery are managing.  Will follow peripherally until patient is transferred out of ICU.  Discussed with Dr. Roper.     Dunia Harrell MD

## 2022-08-30 NOTE — PROGRESS NOTES
Care Management Follow Up    Length of Stay (days): 7     Concerns to be Addressed:       Patient plan of care discussed at interdisciplinary rounds: Yes    Anticipated Discharge Disposition: Goal Home     Anticipated Discharge Services:    Anticipated Discharge DME:      Patient/family educated on Medicare website which has current facility and service quality ratings:    Education Provided on the Discharge Plan:  Per Team  Patient/Family in Agreement with the Plan:  Yes    Referrals Placed by CM/SW:    Private pay costs discussed: Not applicable    Additional Information:  Discussed patient at ICU rounds. Patient requiring IV pressors at this time.     Patients discharge goal is home. CM will continue to follow care progression and aide in discharge planning as needed.     Renetta Valenzuela RN

## 2022-08-30 NOTE — PROGRESS NOTES
CVTS Daily Progress Note   POD#1 s/p left atrial mass removal, PVI/LAAE  Attending: Facundo  LOS: 7    SUBJECTIVE/INTERVAL EVENTS:    Patient arrived to ICU from OR yesterday afternoon. She was subsequently extubated. She remains on gill (0.8) and epi (0.02) this morning; normotensive. Paced at 90bpm; underlying rhythm very bradycardic. Patient progressing well otherwise. Maintaining oxygen saturations on nasal cannula. Up to chair this AM. Pain well controlled although does endorse some surgical pain. - BM / +flatus. Tolerating diet with minimal nausea. UOP adequate. Chest tube output appropriate. Hgb 9.2. Patient denies new chest pain, shortness of breath, abdominal pain, calf pain, nausea. Patient has no questions today.    OBJECTIVE:  Temp:  [97.3  F (36.3  C)-98.3  F (36.8  C)] 98.3  F (36.8  C)  Pulse:  [52-97] 89  Resp:  [14-21] 17  BP: (114-123)/(71-79) 123/79  MAP:  [66 mmHg-101 mmHg] 81 mmHg  Arterial Line BP: ()/(54-81) 107/66  FiO2 (%):  [30 %-80 %] 30 %  SpO2:  [92 %-100 %] 97 %  Vent Mode: CPAP/PS  (Continuous positive airway pressure with Pressure Support)  FiO2 (%): 30 %  Resp Rate (Set): 18 breaths/min  Tidal Volume (Set, mL): 380 mL  PEEP (cm H2O): 5 cmH2O  Pressure Support (cm H2O): 5 cmH2O  Resp: 17    Vitals:    08/26/22 0340 08/27/22 0347 08/28/22 0801 08/29/22 0647   Weight: 65.2 kg (143 lb 11.2 oz) 60.8 kg (134 lb) 59.4 kg (130 lb 14.4 oz) 58.7 kg (129 lb 4.8 oz)    08/30/22 0615   Weight: 70.9 kg (156 lb 6.4 oz)       Current Medications:    Scheduled Meds:    acetaminophen  975 mg Oral Q8H     aspirin  162 mg Oral or NG Tube Daily     ceFAZolin  1 g Intravenous Q8H     cyanocobalamin  1,000 mcg Oral Daily     heparin ANTICOAGULANT  5,000 Units Subcutaneous Q8H     insulin aspart  1-7 Units Subcutaneous TID AC     insulin aspart  1-5 Units Subcutaneous At Bedtime     levothyroxine  175 mcg Oral QAM AC     lidocaine  2 patch Transdermal Q24H     lidocaine   Transdermal Q8H      multivitamin w/minerals  1 tablet Oral Daily     pantoprazole  40 mg Oral or NG Tube Daily    Or     pantoprazole  40 mg Oral Daily     polyethylene glycol  17 g Oral Daily     senna-docusate  1 tablet Oral BID     sodium bicarbonate  25 mEq Intravenous Once     sodium chloride (PF)  3 mL Intracatheter Q8H     sodium phosphate  9 mmol Intravenous Once     vitamin B complex with vitamin C  1 tablet Oral Daily     vitamin C  250 mg Oral Daily     Vitamin D3  25 mcg Oral Daily     Continuous Infusions:    EPINEPHrine 0.02 mcg/kg/min (08/30/22 0400)     niCARdipine       phenylephrine 0.6 mcg/kg/min (08/30/22 9969)     PRN Meds:.[START ON 9/1/2022] acetaminophen, bisacodyl, calcium gluconate, calcium gluconate, calcium gluconate, glucose **OR** dextrose **OR** glucagon, EPINEPHrine, hydrALAZINE, HYDROmorphone **OR** HYDROmorphone, ketorolac, lactated ringers, lidocaine 4%, lidocaine (buffered or not buffered), magnesium hydroxide, naloxone **OR** naloxone **OR** naloxone **OR** naloxone, niCARdipine, ondansetron **OR** ondansetron, oxyCODONE **OR** oxyCODONE, phenylephrine, prochlorperazine **OR** prochlorperazine, sodium chloride (PF)    Cardiographics:    Telemetry monitoring demonstrates paced rhythm at 90bpm per my personal review.    Imaging:  Results for orders placed or performed during the hospital encounter of 08/23/22   XR Chest 2 Views    Impression    IMPRESSION: Right posterior costophrenic angle is clipped on the lateral view. Lungs are clear. Heart and pulmonary vascularity are normal. No signs of acute disease.   US Carotid Bilateral    Impression    IMPRESSION:  1.  Mild plaque formation, velocities consistent with less than 50% stenosis in the right internal carotid artery.  2.  Mild plaque formation, velocities consistent with less than 50% stenosis in the left internal carotid artery.  3.  Flow within the vertebral arteries is antegrade.   XR Chest Port 1 View    Impression    IMPRESSION:  Postoperative changes after cardiac surgery with sternal wires and mediastinal clips present. Heart size and pulmonary vessels are normal. Lungs clear. No pneumothorax. Tubes and catheters appear in good position.   XR Chest Port 1 View    Impression    IMPRESSION: Poststernotomy changes with mediastinal and right pleural chest tubes. Patient's been extubated. Right IJ cordis is at the innominate vein confluence. No pneumothorax.    Linear atelectasis in the left lateral costophrenic angle. Right lung is clear. Heart and pulmonary vascularity are normal.       Labs, personally reviewed.  Hemoglobin   Date Value Ref Range Status   08/30/2022 9.2 (L) 11.7 - 15.7 g/dL Final   08/29/2022 10.0 (L) 11.7 - 15.7 g/dL Final     Comment:     This result has been called to SEE COMM LOG by Jennifer Lowery on 08 29 2022 at 2356, and has not been read back.    08/29/2022 13.5 11.7 - 15.7 g/dL Final     WBC Count   Date Value Ref Range Status   08/30/2022 10.5 4.0 - 11.0 10e3/uL Final   08/29/2022 13.6 (H) 4.0 - 11.0 10e3/uL Final   08/29/2022 12.0 (H) 4.0 - 11.0 10e3/uL Final     Platelet Count   Date Value Ref Range Status   08/30/2022 128 (L) 150 - 450 10e3/uL Final   08/29/2022 135 (L) 150 - 450 10e3/uL Final   08/29/2022 156 150 - 450 10e3/uL Final     Creatinine   Date Value Ref Range Status   08/30/2022 0.71 0.60 - 1.10 mg/dL Final   08/29/2022 0.73 0.60 - 1.10 mg/dL Final   08/27/2022 0.75 0.60 - 1.10 mg/dL Final     Potassium   Date Value Ref Range Status   08/30/2022 4.0 3.5 - 5.0 mmol/L Final   08/30/2022 3.8 3.5 - 5.0 mmol/L Final   08/29/2022 3.1 (L) 3.5 - 5.0 mmol/L Final     Magnesium   Date Value Ref Range Status   08/30/2022 1.7 (L) 1.8 - 2.6 mg/dL Final   08/29/2022 2.9 (H) 1.8 - 2.6 mg/dL Final   08/23/2022 1.7 (L) 1.8 - 2.6 mg/dL Final          I/O:  I/O last 3 completed shifts:  In: 7676.09 [P.O.:720; I.V.:5834.09; Other:670; IV Piggyback:202]  Out: 4285 [Urine:2885; Blood:500; Chest Tube:900]       Physical  Exam:    General: Patient seen in bed. NAD. Conversant. Pleasant.   CV: RRR on monitor. 2+ peripheral pulses in all extremities. Mild edema.   Pulm: Non-labored effort on nasal cannula. Chest tubes in place, serosanguinous output, no air leak. Incision C/D/I.  Abd: Soft, NT, ND  : Zavala with jean paul urine  Ext: Mild pedal edema, SCDs in place, warm, distal pulses intact  Neuro: CNs grossly intact.      ASSESSMENT/PLAN:    Torrie Meyer is a 57 year old female with a history of CAD and a-fib who is POD#1 s/p left atrial mass removal, PVI/LAAE.    Principal Problem:    Atrial myxoma  Active Problems:    Atrial fibrillation with slow ventricular response (H)    Acquired hypothyroidism    Hypomagnesemia    Short of breath on exertion    Left atrial mass        NEURO:   - Scheduled Tylenol and PRN Tylenol/oxycodone/dilaudid/lidocaine patches/Toradol for pain    CV:   - Normotensive  - Weaning pressors as hemodynamics allow, currently on epi and gill  - ASA 81mg  - No indication for statin  - Chest tubes to remain today  - Pacer dependent currently    PULM:   - Maintaining oxygen saturations on nasal cannula  - Encourage pulmonary toilet    FEN/GI:  - Continue electrolyte replacement protocol  - Diet: Cardiac, ADAT  - Bowel regimen  - PTA MVI, vitamins B12, B, C, D3    RENAL:  - Adequate UOP/hr. Continue to monitor closely via zavala.   - Cr 0.71 with recheck tomorrow  - Zavala to remain in for close monitoring of I/O and during period of diuresis/relative immobility.   - Diuresis PRN and pending weaning from pressors    HEME:  - Acute blood loss anemia post-op.   - Hgb stable, no bleeding concerns. Hep SQ, ASA    ID:  - Suzanne op ppx complete, afebrile. No concerns for infection    ENDO:   - Transition to sliding scale insulin  - PTA Synthroid  - PTA Fosamax    PPx:   - DVT: SCDs, SQ heparin TID, ambulation   - GI: Protonix 40mg PO daily    DISPO:   - Continue critical care in ICU due to pressor and pacer need        Patient  discussed with Dr. Loredo.        _______  GARRISON VoraC  Cardiothoracic Surgery  [phone]

## 2022-08-30 NOTE — PROGRESS NOTES
[facility]:  PULMONARY/CRITICAL CARE PROGRESS NOTE     Date / Time of Admission:  8/23/2022  6:58 PM    ID: Torrie Meyer is a 57 year old female, never smoker, with hypothyroidism, recent diagnosis of atrial fibrillation who presented to [facility] ED on 8/23/2022  with a newly identified left atrium myxoma and cardiac systolic dysfunction (LVEF 40-45%, mild RV dysfunction) on outpatient echocardiogram.  Underwent surgical removal of left atrial myxoma and reconstruction of atrial septum on 8/29/2022 with Dr. Loredo, admitted to the ICU for postoperative management.    Reason for Consult: Postoperative ICU management         Assessment: 1.   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 Found a large 4 x 4 x 4 cm mobile homogenous left atrial mass on outpatient TTE for Afib workup.  MARII confirmed 3 - 4 - 4.5 cm mass attached to atrial septum. Minimal symptoms aside from some dyspnea with exertion. Went to OR with Dr. Loredo 8/29 for extended transseptal approach and removal of a left atrial myxoma; reconstruction of the atrial septum with autologous pericardium; bilateral pulmonary vein isolation; excision of left atrial appendage.  mL.    2. Circulatory/cardiogenic shock with mild systolic dysfunction.  TTE 8/24 with LVEF 40-45%, mildly decreased RV systolic fxn.  Coronary angiogram 8/25 unremarkable.  Hypotension in post-operative state.   3. Paroxysmal atrial fibrillation s/p b/l pulmonary vein isolation and HEATHER ligation 8/29/22.  Patient found to have atrial fibrillation on screening EKG for colonoscopy earlier this month, which prompted TTE evaluation.  Went to OR 8/29 for LA myxoma removal, also had bilateral pulmonary vein isolation and excision of left atrial appendage.  4. Bradycardia requiring AV pacing.  Currently 100% paced with temporary wires. CT surgery team attempted to lower HR 8/29 but pt developed  hypotension.   5. Acute respiratory failure with hypoxia, improved.  Intubated 8/29 for OR, metabolic acidosis delayed extubation, given 2 amp bicarb, later extubated 23:58 hr. Currently on 2 L/min NC.   6. Electrolyte abnormalities: Hypernatremia, hyperchloremia, hypomagnesemia, hypocalemia.   7. Normocytic anemia, anemia of acute blood loss.   mL in OR, given 670 mL Cellsaver.   8. Primary hypothyroidism. TSH 78.80 on 8/17 but free T4 normal at 1.04 consistent with subclinical hypothyroidism.     Lines/tubes:  Right IJ CVC double-lumen catheter, placed 8/29  Arreaga catheter, placed 8/29  32 French mediastinal chest tube, placed 8/29  24 French right pleural chest tube, placed 8/29    CODE STATUS:  Full Code    Clinically Significant Risk Factors Present on Admission                            Plan:     Pain control: Tylenol Q8H, PRNs    SBP goal> 90 mmHg, MAP goal > 65 mmHg    Continue epinephrine gtt at 0.02 mcg/kg/min    Continue phenylephrine as needed for BP goals, titrate down as tolerates.    Currently 100% AV paced, no adjustments of pacemaker HR settings at this time.    Continue ASA daily    Pulmonary hygiene: Encourage I-S Q2H while awake, OOB to chair, cardiac rehab as tolerates    Diuresis and chest tube management per CT surgery team    Perioperative pharmacal prophylaxis with cefazolin IV; last dose today    Clear liquid diet. ADAT, monitor for emesis.     PPI for GI prophylaxis, heparin subcutaneous for DVT prophylaxis    Patient and/or family were educated on the above recommendations. Fiancé at bedside during our discussion.  Thank you for allowing our service to participate in the care of this patient.  Please call with any questions or concerns.    Total critical care time: 35 minutes, with over 50% spent in counseling/coordination of care.      Danna Roper MD, MPH  Pulmonary/Critical Care Medicine  08/30/2022  7:40 AM        Subjective/Interval History:   8/29: Went to OR with   "Facundo.     Overnight, acidotic after wean - pH 7.29. Given 2 amp bicarb, repeat ABG 1 hr later with pH 7.32.  Extubated ~ 23:58 hr.    Remains on 2 vasopressors overnight.     Doing well overall.  Had some emesis when trying to drink Gatorade too quickly.  Mild lightheadedness when standing at the bedside and getting into the chair.  Chest discomfort with coughing, deep breathing.  750 mL with incentive spirometry.    Review of Systems:  Pertinent items are noted in HPI.  The Review of Systems is negative other than noted in the HPI        Allergies/Medications:   Allergies:   No Known Allergies    Continuous Infusions:    EPINEPHrine 0.02 mcg/kg/min (08/30/22 0400)     niCARdipine       phenylephrine 0.8 mcg/kg/min (08/30/22 3595)     Scheduled Medications:    acetaminophen  975 mg Oral Q8H     aspirin  162 mg Oral or NG Tube Daily     ceFAZolin  1 g Intravenous Q8H     cyanocobalamin  1,000 mcg Oral Daily     heparin ANTICOAGULANT  5,000 Units Subcutaneous Q8H     insulin aspart  1-7 Units Subcutaneous TID AC     insulin aspart  1-5 Units Subcutaneous At Bedtime     levothyroxine  175 mcg Oral QAM AC     lidocaine  2 patch Transdermal Q24H     lidocaine   Transdermal Q8H     magnesium sulfate  2 g Intravenous Once     multivitamin w/minerals  1 tablet Oral Daily     pantoprazole  40 mg Oral or NG Tube Daily    Or     pantoprazole  40 mg Oral Daily     polyethylene glycol  17 g Oral Daily     senna-docusate  1 tablet Oral BID     sodium bicarbonate  25 mEq Intravenous Once     sodium chloride (PF)  3 mL Intracatheter Q8H     sodium phosphate  9 mmol Intravenous Once     vitamin B complex with vitamin C  1 tablet Oral Daily     vitamin C  250 mg Oral Daily     Vitamin D3  25 mcg Oral Daily            Objective:   Vitals:  /79   Pulse 89   Temp 98.3  F (36.8  C) (Oral)   Resp 17   Ht 1.549 m (5' 1\")   Wt 70.9 kg (156 lb 6.4 oz)   LMP  (Approximate)   SpO2 98%   BMI 29.55 kg/m    GEN: Pleasant female, " laying in the chair in no acute distress  HEENT: Normocephalic, atraumatic.  Extraoccular eye movements intact, anicteric sclera. Moist mucous membranes.   NECK: Supple.    CHEST: Chest tubes to suction, no airleak.  Sternal incision without bleeding/drainage.  PULM: Non-labored breathing.  No use of accessory muscles.  Clear to ausculation bilaterally.   CVS: Regular rate and rhythm.  Normal S1, S2.  No rubs, murmurs, or gallops.    ABDOMEN: Normoactive bowel sounds.  Non-distended.    EXTREMITES:  No clubbing, cyanosis, or edema.    NEURO:  Awake.  Oriented to person, place, time and situation.  Cranial nerves 2-12 grossly intact.  Moving all extremities.      Intake/Output:  I/O last 3 completed shifts:  In: 7676.09 [P.O.:720; I.V.:5834.09; Other:670; IV Piggyback:202]  Out: 4285 [Urine:2885; Blood:500; Chest Tube:900]        Pertinent Studies:   All laboratory data reviewed  Serum Glucose range:   Recent Labs   Lab 08/30/22  0700 08/30/22  0510 08/30/22  0403 08/30/22  0300 08/30/22  0204   * 120  118* 115* 129* 144*     ABG:   Recent Labs   Lab 08/30/22  0511 08/30/22  0510 08/30/22  0039 08/29/22  2304 08/29/22  2134 08/29/22  1709 08/29/22  1709   PH 7.38 7.39 7.32* 7.32* 7.29*  --  7.34*   PCO2 41  --  43 44 40  --  40   PO2 126*  --  100* 95* 123*  --  310*   HCO3 24  --  21* 22* 19*   < >  --    O2PER  --   --   --  0.3 0.3  --  0.7    < > = values in this interval not displayed.     CBC:   Recent Labs   Lab 08/30/22  0510 08/29/22  2303 08/29/22  1645 08/29/22  1533 08/24/22  1437 08/23/22  1802   WBC 10.5  --  13.6* 12.0*   < > 5.8   HGB 9.2* 10.0* 13.5 10.4*   < > 14.3   HCT 26.4*  --  38.9 29.6*   < > 40.9   MCV 95  --  95 95   < > 95   *  --  135* 156   < > 206   NEUTROPHIL  --   --   --   --   --  50   LYMPH  --   --   --   --   --  43   MONOCYTE  --   --   --   --   --  5   EOSINOPHIL  --   --   --   --   --  1    < > = values in this interval not displayed.     Chemistry:   Recent  Labs   Lab 08/30/22  0510 08/30/22  0040 08/29/22  1645 08/27/22  0550 08/25/22  0353 08/23/22  1802   *  --  141 139   < > 141   POTASSIUM 4.0 3.8 3.1* 4.2   < > 3.5   CHLORIDE 116*  --  114* 107   < > 107   CO2 26  --  21* 25   < > 26   BUN 13  --  13 15   < > 18   CR 0.71  --  0.73 0.75   < > 0.85   GFRESTIMATED >90  --  >90 >90   < > 80   YEVGENIY 7.6*  --  8.0* 9.0   < > 9.5   MAG 1.7*  --  2.9*  --   --  1.7*   PROTTOTAL 4.9*  --  5.0*  --   --   --    ALBUMIN 3.8  --  3.6  --   --   --    AST 56*  --  47*  --   --   --    ALT 26  --  27  --   --   --    ALKPHOS 27*  --  36*  --   --   --    BILITOTAL 0.6  --  0.8  --   --   --     < > = values in this interval not displayed.     Coags:  Recent Labs   Lab 08/29/22  1645 08/29/22  1533 08/26/22  1057 08/26/22  0641   INR 1.39* 1.44*  --  1.12   PTT 87* 42* 62*  --      Cardiac Markers:  No results for input(s): CKTOTAL, TROPONINI in the last 168 hours.     Microbiology:  MRSA nasal swab, 8/26: Negative  COVID-19 PCR, 8/23: Negative        Cardiology/Radiology:   Cardiac: All cardiac studies reviewed by me.    EKG:  Reviewed    MARII, 8/24/22:  Summary:  1. The left ventricle is normal in size. Left ventricular systolic performance is mild to moderately reduced. The ejection fraction is estimated to be 40- 45%. 2. There is mild to moderate global reduction in left ventricular systolic performance. 3. There is a 3 x 4 x 4.5 cm mass in the left atrium attached to the atrial septum. The echocardiographic features and anatomic location are paradigmatic of an atrial myxoma. The mass moves forward in diastole abutting the mitral valve leaflets and extends through the mitral annular valve plane. 4. Mild right ventricular enlargement with low normal right ventricular systolic performance. 5. There is moderate left atrial enlargement. There is mild to moderate right atrial enlargement. 6. No thrombus is detected within the left atrial appendage. 7. Color Doppler  "interrogation the atrial septum suggest the presence of a small patent foramen ovale (PFO). No right to left shunting, however, is detected with echo contrast (\"bubble\") study.    TTE, 8/18/22:  Summary:  Left ventricular function is decreased. The ejection fraction is 40-45% (mildly reduced). The left atrium is moderate to severely dilated. Increased mitral inflow velocities related to occlusion from left atrial mass. Leaflets appear normal. 4x4x4 cm mobile inhomogeneous left atrial mass. Appears to be attached to interatrial septum, most consistent with left atrial myxoma. Cannot exclude associated thrombus.Extends through mitral valve plane during diastole. Mildly decreased right ventricular systolic function    Radiology: All imaging studies reviewed by me.    Chest X-Ray, 8/30/22:  Poststernotomy changes with mediastinal and right pleural chest tubes. Patient's been extubated. Right IJ cordis is at the innominate vein confluence. No pneumothorax. Linear atelectasis in the left lateral costophrenic angle. Right lung is clear. Heart and pulmonary vascularity are normal.         "

## 2022-08-30 NOTE — PROGRESS NOTES
RCAT Treatment Plan    Patient Score: 9  Patient Acuity: 4    Clinical Indication for Therapy: prevent atelectasis    Therapy Ordered: Flutter valve QID, IS Q1 W/A    Assessment Summary: RCAT done per protocol, BS diminished, RR 18-20, 2L NC sats 95%. Encourage pt. to take deep breaths and cough, UM=337, titrate oxygen as tolerated, RCAT re-eval 8/31. RT following     Gabriela Cade, RT  8/30/2022

## 2022-08-30 NOTE — PROGRESS NOTES
Patient weaning on pressure support 5/5.  Comfortable.  Good strength.  ABG back with pH of 7.29 and PCO2 of 40.  Bicarb 19.    Give 2 Amps of bicarb and repeat ABG in an hour.  If pH is improved then we can go ahead with extubation.

## 2022-08-30 NOTE — PLAN OF CARE
Goal Outcome Evaluation:    [facility] - ICU    RN Progress Note:            Pertinent Assessments:      Please refer to flowsheet rows for full assessment     Patient drowsy and slow to wake up. VSS on two vasoactive gtts.          Mobility Level:     Bedrest until extubation         Key Events - This Shift:     Sedation turned off at 1900 but pt remained lethargic and slow to wakeup. Hence, weaning from vent commenced around 2010. ABG after an hour of weaning showed  metabolic acidosis. Bicarb given & acidosis improved. CVTS team updated on pt's status including somewhat moderate chest tube output. As such, Hgb recheck ordered.               Plan:     Waiting for ABG results before extubating pt.          Point of Contact Update Yes    Name: Ricky (pt's SO) & Yelena (pt's friend)  Phone Number: At bedside  Summary of Conversation: Cardiac surgery recovery pathway.

## 2022-08-31 ENCOUNTER — APPOINTMENT (OUTPATIENT)
Dept: OCCUPATIONAL THERAPY | Facility: HOSPITAL | Age: 57
End: 2022-08-31
Payer: COMMERCIAL

## 2022-08-31 LAB
ANION GAP SERPL CALCULATED.3IONS-SCNC: 5 MMOL/L (ref 5–18)
BUN SERPL-MCNC: 9 MG/DL (ref 8–22)
CALCIUM SERPL-MCNC: 7.9 MG/DL (ref 8.5–10.5)
CALCIUM, IONIZED MEASURED: 1.12 MMOL/L (ref 1.11–1.3)
CHLORIDE BLD-SCNC: 106 MMOL/L (ref 98–107)
CO2 SERPL-SCNC: 24 MMOL/L (ref 22–31)
CREAT SERPL-MCNC: 0.57 MG/DL (ref 0.6–1.1)
GFR SERPL CREATININE-BSD FRML MDRD: >90 ML/MIN/1.73M2
GLUCOSE BLD-MCNC: 114 MG/DL (ref 70–125)
GLUCOSE BLDC GLUCOMTR-MCNC: 121 MG/DL (ref 70–99)
GLUCOSE BLDC GLUCOMTR-MCNC: 123 MG/DL (ref 70–99)
GLUCOSE BLDC GLUCOMTR-MCNC: 142 MG/DL (ref 70–99)
GLUCOSE BLDC GLUCOMTR-MCNC: 144 MG/DL (ref 70–99)
ION CA PH 7.4: 1.14 MMOL/L (ref 1.11–1.3)
MAGNESIUM SERPL-MCNC: 1.8 MG/DL (ref 1.8–2.6)
PH: 7.43 (ref 7.35–7.45)
PHOSPHATE SERPL-MCNC: 1.6 MG/DL (ref 2.5–4.5)
POTASSIUM BLD-SCNC: 4.1 MMOL/L (ref 3.5–5)
SODIUM SERPL-SCNC: 135 MMOL/L (ref 136–145)
TSH SERPL DL<=0.005 MIU/L-ACNC: 3.57 UIU/ML (ref 0.3–5)

## 2022-08-31 PROCEDURE — 99255 IP/OBS CONSLTJ NEW/EST HI 80: CPT | Performed by: INTERNAL MEDICINE

## 2022-08-31 PROCEDURE — 999N000157 HC STATISTIC RCP TIME EA 10 MIN

## 2022-08-31 PROCEDURE — 99291 CRITICAL CARE FIRST HOUR: CPT | Mod: FT | Performed by: INTERNAL MEDICINE

## 2022-08-31 PROCEDURE — 97110 THERAPEUTIC EXERCISES: CPT | Mod: GO

## 2022-08-31 PROCEDURE — 80048 BASIC METABOLIC PNL TOTAL CA: CPT | Performed by: PHYSICIAN ASSISTANT

## 2022-08-31 PROCEDURE — 84100 ASSAY OF PHOSPHORUS: CPT | Performed by: THORACIC SURGERY (CARDIOTHORACIC VASCULAR SURGERY)

## 2022-08-31 PROCEDURE — 250N000013 HC RX MED GY IP 250 OP 250 PS 637: Performed by: PHYSICIAN ASSISTANT

## 2022-08-31 PROCEDURE — 84443 ASSAY THYROID STIM HORMONE: CPT | Performed by: INTERNAL MEDICINE

## 2022-08-31 PROCEDURE — 94799 UNLISTED PULMONARY SVC/PX: CPT

## 2022-08-31 PROCEDURE — 83735 ASSAY OF MAGNESIUM: CPT | Performed by: THORACIC SURGERY (CARDIOTHORACIC VASCULAR SURGERY)

## 2022-08-31 PROCEDURE — 200N000001 HC R&B ICU

## 2022-08-31 PROCEDURE — 97535 SELF CARE MNGMENT TRAINING: CPT | Mod: GO

## 2022-08-31 PROCEDURE — 250N000011 HC RX IP 250 OP 636: Performed by: PHYSICIAN ASSISTANT

## 2022-08-31 PROCEDURE — 82330 ASSAY OF CALCIUM: CPT | Performed by: PHYSICIAN ASSISTANT

## 2022-08-31 RX ORDER — FUROSEMIDE 10 MG/ML
20 INJECTION INTRAMUSCULAR; INTRAVENOUS 3 TIMES DAILY
Status: DISCONTINUED | OUTPATIENT
Start: 2022-08-31 | End: 2022-09-01

## 2022-08-31 RX ADMIN — B-COMPLEX W/ C & FOLIC ACID TAB 1 TABLET: TAB at 09:30

## 2022-08-31 RX ADMIN — OXYCODONE HYDROCHLORIDE 10 MG: 5 TABLET ORAL at 17:40

## 2022-08-31 RX ADMIN — HEPARIN SODIUM 5000 UNITS: 5000 INJECTION, SOLUTION INTRAVENOUS; SUBCUTANEOUS at 13:01

## 2022-08-31 RX ADMIN — Medication 25 MCG: at 09:36

## 2022-08-31 RX ADMIN — PROCHLORPERAZINE EDISYLATE 10 MG: 5 INJECTION INTRAMUSCULAR; INTRAVENOUS at 08:16

## 2022-08-31 RX ADMIN — OXYCODONE HYDROCHLORIDE 5 MG: 5 TABLET ORAL at 12:28

## 2022-08-31 RX ADMIN — SENNOSIDES AND DOCUSATE SODIUM 1 TABLET: 8.6; 5 TABLET ORAL at 09:30

## 2022-08-31 RX ADMIN — Medication 1000 MCG: at 09:30

## 2022-08-31 RX ADMIN — Medication 1 TABLET: at 09:30

## 2022-08-31 RX ADMIN — HEPARIN SODIUM 5000 UNITS: 5000 INJECTION, SOLUTION INTRAVENOUS; SUBCUTANEOUS at 04:56

## 2022-08-31 RX ADMIN — FUROSEMIDE 20 MG: 10 INJECTION, SOLUTION INTRAMUSCULAR; INTRAVENOUS at 17:40

## 2022-08-31 RX ADMIN — HEPARIN SODIUM 5000 UNITS: 5000 INJECTION, SOLUTION INTRAVENOUS; SUBCUTANEOUS at 21:09

## 2022-08-31 RX ADMIN — LIDOCAINE PATCH 4% 2 PATCH: 40 PATCH TOPICAL at 09:31

## 2022-08-31 RX ADMIN — OXYCODONE HYDROCHLORIDE 5 MG: 5 TABLET ORAL at 02:07

## 2022-08-31 RX ADMIN — INSULIN ASPART 1 UNITS: 100 INJECTION, SOLUTION INTRAVENOUS; SUBCUTANEOUS at 09:32

## 2022-08-31 RX ADMIN — LEVOTHYROXINE SODIUM 175 MCG: 0.03 TABLET ORAL at 06:45

## 2022-08-31 RX ADMIN — ASPIRIN 81 MG: 81 TABLET, CHEWABLE ORAL at 09:30

## 2022-08-31 RX ADMIN — ACETAMINOPHEN 975 MG: 325 TABLET ORAL at 05:40

## 2022-08-31 RX ADMIN — SENNOSIDES AND DOCUSATE SODIUM 1 TABLET: 8.6; 5 TABLET ORAL at 21:09

## 2022-08-31 RX ADMIN — PANTOPRAZOLE SODIUM 40 MG: 40 TABLET, DELAYED RELEASE ORAL at 09:31

## 2022-08-31 RX ADMIN — POLYETHYLENE GLYCOL 3350 17 G: 17 POWDER, FOR SOLUTION ORAL at 09:31

## 2022-08-31 RX ADMIN — ACETAMINOPHEN 975 MG: 325 TABLET ORAL at 16:00

## 2022-08-31 RX ADMIN — Medication 250 MG: at 09:30

## 2022-08-31 RX ADMIN — FUROSEMIDE 20 MG: 10 INJECTION, SOLUTION INTRAMUSCULAR; INTRAVENOUS at 12:28

## 2022-08-31 RX ADMIN — ACETAMINOPHEN 975 MG: 325 TABLET ORAL at 21:09

## 2022-08-31 ASSESSMENT — ACTIVITIES OF DAILY LIVING (ADL)
ADLS_ACUITY_SCORE: 28

## 2022-08-31 NOTE — CONSULTS
Impression and Plan     Assessment:  1. Left atrial myxoma s/p surgical resection  2. Sinus arrest - currently with temporary pacemaker set to 90 bpm. She did not have an intrinsic rhythm at all yesterday and now has a baseline junctional rhythm of 35 bpm. She is still on epinephrine for blood pressure support.     Plan:    Continue with temporary pacemaker to see if her intrinsic rhythm returns. If she does not recover a reliable rhythm in the next 1-2 days, will plan for placement of a permanent pacemaker on Friday.    Discussed with Dr. Hernandez.    Primary Cardiologist: Dr. Anahi Sawant MD    History of Present Illness      Ms. Torrie Meyer is a 57 year old female with atrial fibrillation who was admitted for surgical resection of a large left atrial myxoma. After surgery, Torrie is doing generally well.  She continues to have a chest tube in place and is on epinephrine for blood pressure support.  She was recently weaned off of phenylephrine.  She continues to have a temporary pacemaker in place and has a baseline junctional rhythm at a heart rate of 35 bpm.  The pacemaker is currently set at 95 bpm.  When the pacemaker was paused she becomes lightheaded and dizzy sometimes nauseated.  With the pacemaker on she is feeling generally well and is able to tolerate a clear liquid diet.    Other than noted above, Ms. Meyer denies any chest pain/pressure/tightness, shortness of breath at rest or with exertion, light headedness/dizziness, pre-syncope, syncope, lower extremity swelling, palpitations, paroxysmal nocturnal dyspnea (PND), or orthopnea.      Review of Systems:  Further review of systems is otherwise negative/noncontributory (based on review of medical record (admission H&P) and 13 point review of systems reviewed. Pertinent positives noted).    Cardiac Diagnostics       Telemetry (personally reviewed): paced rhythm. Pausing the temporary pacemaker reveals an underlying junctional rhythm at around 35  "bpm.    Most recent:  Echocardiogram (results reviewed):   TTE 8/24/22  1. The left ventricle is normal in size. Left ventricular systolic performance is mild to moderately reduced. The ejection fraction is estimated to be 40-45%.  2. There is mild to moderate global reduction in left ventricular systolic performance.  3. There is a 3 x 4 x 4.5 cm mass in the left atrium attached to the atrial septum. The echocardiographic features and anatomic location are paradigmaticof an atrial myxoma. The mass moves forward in diastole abutting the mitral valve leaflets and extends through the mitral annular valve plane.  4. Mild right ventricular enlargement with low normal right ventricular systolic performance.  5. There is moderate left atrial enlargement. There is mild to moderate right atrial enlargement.  6. No thrombus is detected within the left atrial appendage.  7. Color Doppler interrogation the atrial septum suggest the presence of a small patent foramen ovale (PFO). No right to left shunting, however, is detected with echo contrast (\"bubble\") study.    Cardiac Cath (results reviewed): 8/25/22  57-year-old female with recent diagnosis of atrial myxoma, here for preoperative coronary angiography.   Coronary angiography showed no coronary artery disease with minimal atherosclerosis and a right dominant coronary system.   Calcified mass was noted on fluoroscopy, which likely represented the atrial myxoma      Medical History  Surgical History Family History Social History   History reviewed. No pertinent past medical history.  Past Surgical History:   Procedure Laterality Date     CV CORONARY ANGIOGRAM N/A 8/25/2022    Procedure: Coronary Angiogram;  Surgeon: Najma Pinedo MD;  Location: [facility] CATH LAB CV     MITRAL VALVE REPAIR N/A 8/29/2022    Procedure: LEFT ATRIAL MXYOMA REMOVAL, ANESTHESIA TRANSESOPHAGEAL ECHCARDIOGRAM,BILATERAL PULMONARY VEIN ISOLATION, EPIAORTIC ULTRASOUND;  Surgeon: Jeniffer Loredo, " "MD;  Location: [facility] OR     OR LIGATION, LEFT ATRIAL APPENDAGE Left 8/29/2022    Procedure: REMOVAL LEFT ATRIAL APPENDAGE;  Surgeon: Jeniffer Loredo MD;  Location: [facility] Main OR     Family History   Problem Relation Age of Onset     Cancer Mother         stomach     Cancer Father         prostate     No Known Problems Sister      No Known Problems Daughter      No Known Problems Maternal Grandmother      No Known Problems Maternal Grandfather      No Known Problems Paternal Grandmother      No Known Problems Paternal Grandfather      No Known Problems Maternal Aunt      No Known Problems Paternal Aunt      Hereditary Breast and Ovarian Cancer Syndrome No family hx of      Breast Cancer No family hx of      Colon Cancer No family hx of      Endometrial Cancer No family hx of      Ovarian Cancer No family hx of            Social History     Socioeconomic History     Marital status: Single     Spouse name: Not on file     Number of children: Not on file     Years of education: Not on file     Highest education level: Not on file   Occupational History     Not on file   Tobacco Use     Smoking status: Never Smoker     Smokeless tobacco: Never Used   Substance and Sexual Activity     Alcohol use: Not on file     Drug use: Not on file     Sexual activity: Not on file   Other Topics Concern     Not on file   Social History Narrative     Not on file     Social Determinants of Health     Financial Resource Strain: Not on file   Food Insecurity: Not on file   Transportation Needs: Not on file   Physical Activity: Not on file   Stress: Not on file   Social Connections: Not on file   Intimate Partner Violence: Not on file   Housing Stability: Not on file             Physical Examination   VITALS: /79   Pulse 89   Temp 98.5  F (36.9  C) (Oral)   Resp 17   Ht 1.549 m (5' 1\")   Wt 67.6 kg (149 lb 1.6 oz)   LMP  (Approximate)   SpO2 98%   BMI 28.17 kg/m    BMI: Body mass index is 28.17 kg/m .  Wt " Readings from Last 3 Encounters:   08/31/22 67.6 kg (149 lb 1.6 oz)   08/18/22 62.1 kg (137 lb)       Intake/Output Summary (Last 24 hours) at 8/31/2022 1113  Last data filed at 8/31/2022 1030  Gross per 24 hour   Intake 2643.49 ml   Output 1705 ml   Net 938.49 ml       General Appearance:  Alert, cooperative, no distress, appears stated age sitting comfortably in a chair   Head:  Normocephalic, without obvious abnormality, atraumatic   Eyes:  PERRL, conjunctiva/corneas clear, EOM's intact   Ears:  Normal external ear canals bilaterally   Nose: Nares normal, septum midline, no drainage   Throat: Lips, mucosa, and tongue normal; teeth and gums normal   Neck: Supple, no JVD   Back:   Symmetric, no abnormal curvature, ROM normal   Lungs:   Clear to auscultation bilaterally, respirations unlabored   Chest Wall:  Stable sternotomy incision   Heart:  Regular rate and rhythm, S1, S2 normal,no murmur, rub or gallop   Abdomen:   Soft, non-tender   Extremities: Extremities normal, atraumatic, no cyanosis or edema   Skin: Skin color, texture, turgor normal, no rashes or lesions   Psychiatric: Normal affect, pleasant and cooperative   Neurologic: Alert and oriented X 3, Moves all 4 extremities            Imaging      Chest x-ray, 8/30 - IMPRESSION: Poststernotomy changes with mediastinal and right pleural chest tubes. Patient's been extubated. Right IJ cordis is at the innominate vein confluence. No pneumothorax.     Linear atelectasis in the left lateral costophrenic angle. Right lung is clear. Heart and pulmonary vascularity are normal.       Lab Results    Chemistry/lipid CBC Cardiac Enzymes/BNP/TSH/INR   Recent Labs   Lab Test 08/23/22  1802   CHOL 187   HDL 62      TRIG 66     Recent Labs   Lab Test 08/23/22  1802 07/23/20  1038    83     Recent Labs   Lab Test 08/31/22  0746 08/31/22  0456   NA  --  135*   POTASSIUM  --  4.1   CHLORIDE  --  106   CO2  --  24   * 114   BUN  --  9   CR  --  0.57*    GFRESTIMATED  --  >90   YEVGENIY  --  7.9*     Recent Labs   Lab Test 08/31/22  0456 08/30/22  0510 08/29/22  1645   CR 0.57* 0.71 0.73     Recent Labs   Lab Test 08/24/22  1437   A1C 4.9          Recent Labs   Lab Test 08/30/22  0510   WBC 10.5   HGB 9.2*   HCT 26.4*   MCV 95   *     Recent Labs   Lab Test 08/30/22  0510 08/29/22  2303 08/29/22  1645   HGB 9.2* 10.0* 13.5    No results for input(s): TROPONINI in the last 50827 hours.  No results for input(s): BNP, NTBNPI, NTBNP in the last 51834 hours.  Recent Labs   Lab Test 08/17/22  1041   TSH 78.80*     Recent Labs   Lab Test 08/29/22  1645 08/29/22  1533 08/26/22  0641   INR 1.39* 1.44* 1.12           Current Inpatient Scheduled Medications   Scheduled Meds:    acetaminophen  975 mg Oral Q8H     aspirin  81 mg Oral or NG Tube Daily     cyanocobalamin  1,000 mcg Oral Daily     furosemide  20 mg Intravenous TID     heparin ANTICOAGULANT  5,000 Units Subcutaneous Q8H     insulin aspart  1-7 Units Subcutaneous TID AC     insulin aspart  1-5 Units Subcutaneous At Bedtime     levothyroxine  175 mcg Oral QAM AC     lidocaine  2 patch Transdermal Q24H     lidocaine   Transdermal Q8H     multivitamin w/minerals  1 tablet Oral Daily     pantoprazole  40 mg Oral or NG Tube Daily    Or     pantoprazole  40 mg Oral Daily     polyethylene glycol  17 g Oral Daily     senna-docusate  1 tablet Oral BID     sodium chloride (PF)  3 mL Intracatheter Q8H     vitamin B complex with vitamin C  1 tablet Oral Daily     vitamin C  250 mg Oral Daily     Vitamin D3  25 mcg Oral Daily     Continuous Infusions:    EPINEPHrine 0.02 mcg/kg/min (08/31/22 0000)     niCARdipine       phenylephrine Stopped (08/30/22 1725)       No current outpatient medications on file.          Medications Prior to Admission   Prior to Admission medications    Medication Sig Start Date End Date Taking? Authorizing Provider   alendronate (FOSAMAX) 70 MG tablet TAKE 1 TABLET BY MOUTH 1 TIME A WEEK 12/11/21   Yes Reported, Patient   aspirin (ASA) 81 MG EC tablet Take 1 tablet (81 mg) by mouth daily 8/18/22  Yes Anahi Sawant MD   cyanocobalamin (VITAMIN B-12) 1000 MCG tablet Take 1,000 mcg by mouth daily   Yes Unknown, Entered By History   fish oil-omega-3 fatty acids 1000 MG capsule Take 1 g by mouth daily   Yes Unknown, Entered By History   levothyroxine (SYNTHROID/LEVOTHROID) 175 MCG tablet Take 175 mcg by mouth daily 2/2/22  Yes Reported, Patient   multivitamin w/minerals (THERA-VIT-M) tablet Take 1 tablet by mouth daily   Yes Unknown, Entered By History   vitamin B-Complex Take 1 tablet by mouth daily   Yes Unknown, Entered By History   vitamin C (ASCORBIC ACID) 250 MG tablet Take 250 mg by mouth daily   Yes Unknown, Entered By History   Vitamin D3 (CHOLECALCIFEROL) 25 mcg (1000 units) tablet Take 1 tablet by mouth daily   Yes Unknown, Entered By History

## 2022-08-31 NOTE — PROGRESS NOTES
Worked with PT on IS and aerobika at beginning of shift. Minimal re education needed. Rt to follow at least once more.    Shiv Bañuelos, RT on 8/31/2022 at 5:07 AM

## 2022-08-31 NOTE — PROGRESS NOTES
CVTS Daily Progress Note   POD#2 s/p left atrial mass removal, PVI/LAAE  Attending: Facundo  LOS: 8    SUBJECTIVE/INTERVAL EVENTS:    No acute events overnight She was weaned from epi yesterday and remains on gill at decreasing doses; normotensive. Paced at 90bpm; underlying rhythm very bradycardic. Patient progressing well otherwise. Maintaining oxygen saturations on nasal cannula. Up to chair this AM. Pain well controlled although does endorse some surgical pain. - BM / +flatus. Tolerating diet without nausea this morning; did have some emesis yesterday but feels this has improved. UOP adequate. Chest tube output appropriate. Patient denies new chest pain, shortness of breath, abdominal pain, calf pain, nausea. Patient has no questions today.    OBJECTIVE:  Temp:  [97.4  F (36.3  C)-98.5  F (36.9  C)] 98.5  F (36.9  C)  Pulse:  [88-90] 89  MAP:  [70 mmHg-87 mmHg] 78 mmHg  Arterial Line BP: ()/(54-69) 105/63  SpO2:  [91 %-99 %] 98 %  Resp: 17    Vitals:    08/27/22 0347 08/28/22 0801 08/29/22 0647 08/30/22 0615   Weight: 60.8 kg (134 lb) 59.4 kg (130 lb 14.4 oz) 58.7 kg (129 lb 4.8 oz) 70.9 kg (156 lb 6.4 oz)    08/31/22 0600   Weight: 67.6 kg (149 lb 1.6 oz)       Current Medications:    Scheduled Meds:    acetaminophen  975 mg Oral Q8H     aspirin  81 mg Oral or NG Tube Daily     cyanocobalamin  1,000 mcg Oral Daily     heparin ANTICOAGULANT  5,000 Units Subcutaneous Q8H     insulin aspart  1-7 Units Subcutaneous TID AC     insulin aspart  1-5 Units Subcutaneous At Bedtime     levothyroxine  175 mcg Oral QAM AC     lidocaine  2 patch Transdermal Q24H     lidocaine   Transdermal Q8H     multivitamin w/minerals  1 tablet Oral Daily     pantoprazole  40 mg Oral or NG Tube Daily    Or     pantoprazole  40 mg Oral Daily     polyethylene glycol  17 g Oral Daily     senna-docusate  1 tablet Oral BID     sodium chloride (PF)  3 mL Intracatheter Q8H     vitamin B complex with vitamin C  1 tablet Oral Daily      vitamin C  250 mg Oral Daily     Vitamin D3  25 mcg Oral Daily     Continuous Infusions:    EPINEPHrine 0.02 mcg/kg/min (08/31/22 0000)     niCARdipine       phenylephrine Stopped (08/30/22 1725)     PRN Meds:.[START ON 9/1/2022] acetaminophen, bisacodyl, calcium gluconate, calcium gluconate, calcium gluconate, glucose **OR** dextrose **OR** glucagon, EPINEPHrine, hydrALAZINE, HYDROmorphone **OR** HYDROmorphone, ketorolac, lactated ringers, lidocaine 4%, lidocaine (buffered or not buffered), magnesium hydroxide, naloxone **OR** naloxone **OR** naloxone **OR** naloxone, niCARdipine, ondansetron **OR** ondansetron, oxyCODONE **OR** oxyCODONE, phenylephrine, prochlorperazine **OR** prochlorperazine, sodium chloride (PF)    Cardiographics:    Telemetry monitoring demonstrates paced rhythm at 90bpm per my personal review.    Imaging:  Results for orders placed or performed during the hospital encounter of 08/23/22   XR Chest 2 Views    Impression    IMPRESSION: Right posterior costophrenic angle is clipped on the lateral view. Lungs are clear. Heart and pulmonary vascularity are normal. No signs of acute disease.   US Carotid Bilateral    Impression    IMPRESSION:  1.  Mild plaque formation, velocities consistent with less than 50% stenosis in the right internal carotid artery.  2.  Mild plaque formation, velocities consistent with less than 50% stenosis in the left internal carotid artery.  3.  Flow within the vertebral arteries is antegrade.   XR Chest Port 1 View    Impression    IMPRESSION: Postoperative changes after cardiac surgery with sternal wires and mediastinal clips present. Heart size and pulmonary vessels are normal. Lungs clear. No pneumothorax. Tubes and catheters appear in good position.   XR Chest Port 1 View    Impression    IMPRESSION: Poststernotomy changes with mediastinal and right pleural chest tubes. Patient's been extubated. Right IJ cordis is at the innominate vein confluence. No  pneumothorax.    Linear atelectasis in the left lateral costophrenic angle. Right lung is clear. Heart and pulmonary vascularity are normal.       Labs, personally reviewed.  Hemoglobin   Date Value Ref Range Status   08/30/2022 9.2 (L) 11.7 - 15.7 g/dL Final   08/29/2022 10.0 (L) 11.7 - 15.7 g/dL Final     Comment:     This result has been called to SEE COMM LOG by Jennifer Lowery on 08 29 2022 at 2356, and has not been read back.    08/29/2022 13.5 11.7 - 15.7 g/dL Final     WBC Count   Date Value Ref Range Status   08/30/2022 10.5 4.0 - 11.0 10e3/uL Final   08/29/2022 13.6 (H) 4.0 - 11.0 10e3/uL Final   08/29/2022 12.0 (H) 4.0 - 11.0 10e3/uL Final     Platelet Count   Date Value Ref Range Status   08/30/2022 128 (L) 150 - 450 10e3/uL Final   08/29/2022 135 (L) 150 - 450 10e3/uL Final   08/29/2022 156 150 - 450 10e3/uL Final     Creatinine   Date Value Ref Range Status   08/31/2022 0.57 (L) 0.60 - 1.10 mg/dL Final   08/30/2022 0.71 0.60 - 1.10 mg/dL Final   08/29/2022 0.73 0.60 - 1.10 mg/dL Final     Potassium   Date Value Ref Range Status   08/31/2022 4.1 3.5 - 5.0 mmol/L Final   08/30/2022 4.0 3.5 - 5.0 mmol/L Final   08/30/2022 3.8 3.5 - 5.0 mmol/L Final     Magnesium   Date Value Ref Range Status   08/31/2022 1.8 1.8 - 2.6 mg/dL Final   08/30/2022 1.7 (L) 1.8 - 2.6 mg/dL Final   08/29/2022 2.9 (H) 1.8 - 2.6 mg/dL Final          I/O:  I/O last 3 completed shifts:  In: 2763.94 [P.O.:1100; I.V.:1663.94]  Out: 1810 [Urine:1320; Chest Tube:490]       Physical Exam:    General: Patient seen in bed. NAD. Conversant. Pleasant.   CV: RRR on monitor. 2+ peripheral pulses in all extremities. Mild edema.   Pulm: Non-labored effort on nasal cannula. Chest tubes in place, serosanguinous output, no air leak. Incision C/D/I.  Abd: Soft, NT, ND  : Arreaga with jean paul urine  Ext: Mild pedal edema, SCDs in place, warm, distal pulses intact  Neuro: CNs grossly intact.      ASSESSMENT/PLAN:    Torrie Meyer is a 57 year old female  with a history of CAD and a-fib who is POD#2 s/p left atrial mass removal, PVI/LAAE.    Principal Problem:    Atrial myxoma  Active Problems:    Atrial fibrillation with slow ventricular response (H)    Acquired hypothyroidism    Hypomagnesemia    Short of breath on exertion    Left atrial mass        NEURO:   - Scheduled Tylenol and PRN Tylenol/oxycodone/dilaudid/lidocaine patches/Toradol for pain    CV:   - Normotensive  - Weaning pressors as hemodynamics allow, currently on gill 0.02  - ASA 81mg  - No indication for statin  - Chest tubes to remain today  - Pacer dependent currently; will consult cardiology for possible PPM this admission    PULM:   - Maintaining oxygen saturations on nasal cannula  - Encourage pulmonary toilet    FEN/GI:  - Continue electrolyte replacement protocol  - Diet: Cardiac, ADAT  - Bowel regimen  - PTA MVI, vitamins B12, B, C, D3    RENAL:  - Adequate UOP/hr. Continue to monitor closely.  - Discontinue Arreaga  - Cr 0.57  With Lasix 20mg IV TID    HEME:  - Acute blood loss anemia post-op.   - No bleeding concerns. Hep SQ, ASA    ID:  - Suzanne op ppx complete, afebrile. No concerns for infection    ENDO:   - Sliding scale insulin  - PTA Synthroid  - PTA Fosamax    PPx:   - DVT: SCDs, SQ heparin TID, ambulation   - GI: Protonix 40mg PO daily    DISPO:   - Continue critical care in ICU due to pressor and pacer need        Patient discussed with Dr. Loredo.        _______  Daphne Robbins PA-C  Cardiothoracic Surgery  [phone]

## 2022-08-31 NOTE — PROGRESS NOTES
[facility]:  PULMONARY/CRITICAL CARE PROGRESS NOTE     Date / Time of Admission:  8/23/2022  6:58 PM    ID: Torrie Meyer is a 57 year old female, never smoker, with hypothyroidism, recent diagnosis of atrial fibrillation who presented to [facility] ED on 8/23/2022  with a newly identified left atrium myxoma and cardiac systolic dysfunction (LVEF 40-45%, mild RV dysfunction) on outpatient echocardiogram.  Underwent surgical removal of left atrial myxoma and reconstruction of atrial septum on 8/29/2022 with Dr. Loredo, admitted to the ICU for postoperative management.    Reason for Consult: Postoperative ICU management         Assessment: 1.   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 Found a large 4 x 4 x 4 cm mobile homogenous left atrial mass on outpatient TTE for Afib workup.  MARII confirmed 3 - 4 - 4.5 cm mass attached to atrial septum. Minimal symptoms aside from some dyspnea with exertion. Went to OR with Dr. Loredo 8/29 for extended transseptal approach and removal of a left atrial myxoma; reconstruction of the atrial septum with autologous pericardium; bilateral pulmonary vein isolation; excision of left atrial appendage.  mL.    2. Circulatory/cardiogenic shock with mild systolic dysfunction.  TTE 8/24 with LVEF 40-45%, mildly decreased RV systolic fxn.  Coronary angiogram 8/25 unremarkable.  Hypotension in post-operative state. Transitioned off neosynephrine gtt on 8/30.   3. Paroxysmal atrial fibrillation s/p b/l pulmonary vein isolation and HEATHER ligation 8/29/22.  Patient found to have atrial fibrillation on screening EKG for colonoscopy earlier this month, which prompted TTE evaluation.  Went to OR 8/29 for LA myxoma removal, also had bilateral pulmonary vein isolation and excision of left atrial appendage.  4. Bradycardia requiring atrial pacing.  Currently 100% paced with temporary wires. CT surgery team  attempted to lower HR 8/30 but pt developed hypotension.   5. Acute respiratory failure with hypoxia, improved.  Intubated 8/29 for OR, metabolic acidosis delayed extubation, given 2 amp bicarb, later extubated 23:58 hr. Currently on 2 L/min NC.   6. Electrolyte abnormalities: Hypernatremia, hyperchloremia, hypomagnesemia, hypocalemia.   7. Normocytic anemia, anemia of acute blood loss.   mL in OR, given 670 mL Cellsaver.   8. Primary hypothyroidism. TSH 78.80 on 8/17 but free T4 normal at 1.04 consistent with subclinical hypothyroidism.   9. Recurrent emesis, improved.  Last event yesterday afternoon.    Lines/tubes:  Right IJ CVC double-lumen catheter, placed 8/29  Arreaga catheter, placed 8/29  32 Grenadian mediastinal chest tube, placed 8/29  24 Grenadian right pleural chest tube, placed 8/29    CODE STATUS:  Full Code    Clinically Significant Risk Factors Present on Admission                            Plan:     Pain control: Tylenol Q8H, PRNs    SBP goal> 90 mmHg, MAP goal > 65 mmHg    Keep epinephrine gtt at 0.02 mcg/kg/min    Currently 100% atrial paced, no adjustments of pacemaker HR settings at this time.      Hopefully will have independent recovery and not require a permanent pacemaker.     Patient has baseline low HR.    Continue ASA daily    Pulmonary hygiene: Encourage IS Q2H while awake, OOB to chair, cardiac rehab as tolerates    Diuresis and chest tube management per CT surgery team    Secure central line site - discussed with bedside staff.     Regular diet. ADAT, monitor for emesis.     PPI for GI prophylaxis, heparin subcutaneous for DVT prophylaxis    Patient and/or family were educated on the above recommendations.   Thank you for allowing our service to participate in the care of this patient.  Please call with any questions or concerns.    Total critical care time: 38 minutes, with over 50% spent in counseling/coordination of care.      Danna Roper MD, MPH  Pulmonary/Critical Care  "Medicine  08/31/2022  9:24 AM        Subjective/Interval History:   8/29: Went to OR with Dr. Loredo.  Acidotic after wean - pH 7.29. Given 2 amp bicarb, repeat ABG 1 hr later with pH 7.32.  Extubated ~ 23:58 hr.    8/30: Titrated off phenylephrine gtt. Recurrent emesis events.  Tried to bring pacing down had hypotension.      Doing well overall.  Last emesis event yesterday afternoon.   Chest discomfort with deep breathing otherwise well.   Working on IS in the bed/chair.     Review of Systems:  Pertinent items are noted in HPI.  The Review of Systems is negative other than noted in the HPI        Allergies/Medications:   Allergies:   No Known Allergies    Continuous Infusions:    EPINEPHrine 0.02 mcg/kg/min (08/31/22 0000)     niCARdipine       phenylephrine Stopped (08/30/22 1725)     Scheduled Medications:    acetaminophen  975 mg Oral Q8H     aspirin  81 mg Oral or NG Tube Daily     cyanocobalamin  1,000 mcg Oral Daily     heparin ANTICOAGULANT  5,000 Units Subcutaneous Q8H     insulin aspart  1-7 Units Subcutaneous TID AC     insulin aspart  1-5 Units Subcutaneous At Bedtime     levothyroxine  175 mcg Oral QAM AC     lidocaine  2 patch Transdermal Q24H     lidocaine   Transdermal Q8H     multivitamin w/minerals  1 tablet Oral Daily     pantoprazole  40 mg Oral or NG Tube Daily    Or     pantoprazole  40 mg Oral Daily     polyethylene glycol  17 g Oral Daily     senna-docusate  1 tablet Oral BID     sodium chloride (PF)  3 mL Intracatheter Q8H     vitamin B complex with vitamin C  1 tablet Oral Daily     vitamin C  250 mg Oral Daily     Vitamin D3  25 mcg Oral Daily            Objective:   Vitals:  /79   Pulse 89   Temp 98.5  F (36.9  C) (Oral)   Resp 17   Ht 1.549 m (5' 1\")   Wt 67.6 kg (149 lb 1.6 oz)   LMP  (Approximate)   SpO2 98%   BMI 28.17 kg/m    GEN: Pleasant female, laying in the chair in no acute distress  HEENT: Normocephalic, atraumatic.  Extraoccular eye movements intact, anicteric " sclera. Moist mucous membranes.   NECK: Supple.    CHEST: Chest tubes to suction, no airleak.  Sternal incision without bleeding/drainage.  PULM: Non-labored breathing.  No use of accessory muscles.  Clear to ausculation bilaterally.   CVS: Regular rate and rhythm.  Normal S1, S2.  No rubs, murmurs, or gallops.    ABDOMEN: Normoactive bowel sounds.  Non-distended.    EXTREMITES:  No clubbing, cyanosis, or edema.    NEURO:  Awake.  Oriented to person, place, time and situation.  Cranial nerves 2-12 grossly intact.  Moving all extremities.      Intake/Output:  I/O last 3 completed shifts:  In: 2763.94 [P.O.:1100; I.V.:1663.94]  Out: 1810 [Urine:1320; Chest Tube:490]        Pertinent Studies:   All laboratory data reviewed  Serum Glucose range:   Recent Labs   Lab 08/31/22  0746 08/31/22  0456 08/30/22  2021 08/30/22  1803 08/30/22  1223 08/30/22  0700   * 114 151* 144* 198* 125*     ABG:   Recent Labs   Lab 08/31/22  0747 08/30/22  2021 08/30/22  1228 08/30/22  0511 08/30/22  0510 08/30/22  0039 08/29/22  2304 08/29/22  2134 08/29/22  1709 08/29/22  1709   PH 7.43 7.44 7.40 7.38   < > 7.32* 7.32* 7.29*  --  7.34*   PCO2  --   --   --  41  --  43 44 40  --  40   PO2  --   --   --  126*  --  100* 95* 123*  --  310*   HCO3  --   --   --  24  --  21* 22* 19*   < >  --    O2PER  --   --   --   --   --   --  0.3 0.3  --  0.7    < > = values in this interval not displayed.     CBC:   Recent Labs   Lab 08/30/22  0510 08/29/22  2303 08/29/22  1645 08/29/22  1533   WBC 10.5  --  13.6* 12.0*   HGB 9.2* 10.0* 13.5 10.4*   HCT 26.4*  --  38.9 29.6*   MCV 95  --  95 95   *  --  135* 156     Chemistry:   Recent Labs   Lab 08/31/22  0456 08/30/22  0510 08/30/22  0040 08/29/22  1645   * 147*  --  141   POTASSIUM 4.1 4.0 3.8 3.1*   CHLORIDE 106 116*  --  114*   CO2 24 26  --  21*   BUN 9 13  --  13   CR 0.57* 0.71  --  0.73   GFRESTIMATED >90 >90  --  >90   YEVGENIY 7.9* 7.6*  --  8.0*   MAG 1.8 1.7*  --  2.9*  "  PROTTOTAL  --  4.9*  --  5.0*   ALBUMIN  --  3.8  --  3.6   AST  --  56*  --  47*   ALT  --  26  --  27   ALKPHOS  --  27*  --  36*   BILITOTAL  --  0.6  --  0.8     Coags:  Recent Labs   Lab 08/29/22  1645 08/29/22  1533 08/26/22  1057 08/26/22  0641   INR 1.39* 1.44*  --  1.12   PTT 87* 42* 62*  --      Cardiac Markers:  No results for input(s): CKTOTAL, TROPONINI in the last 168 hours.     Microbiology:  MRSA nasal swab, 8/26: Negative  COVID-19 PCR, 8/23: Negative        Cardiology/Radiology:   Cardiac: All cardiac studies reviewed by me.    EKG:  Reviewed    MARII, 8/24/22:  Summary:  1. The left ventricle is normal in size. Left ventricular systolic performance is mild to moderately reduced. The ejection fraction is estimated to be 40- 45%. 2. There is mild to moderate global reduction in left ventricular systolic performance. 3. There is a 3 x 4 x 4.5 cm mass in the left atrium attached to the atrial septum. The echocardiographic features and anatomic location are paradigmatic of an atrial myxoma. The mass moves forward in diastole abutting the mitral valve leaflets and extends through the mitral annular valve plane. 4. Mild right ventricular enlargement with low normal right ventricular systolic performance. 5. There is moderate left atrial enlargement. There is mild to moderate right atrial enlargement. 6. No thrombus is detected within the left atrial appendage. 7. Color Doppler interrogation the atrial septum suggest the presence of a small patent foramen ovale (PFO). No right to left shunting, however, is detected with echo contrast (\"bubble\") study.    TTE, 8/18/22:  Summary:  Left ventricular function is decreased. The ejection fraction is 40-45% (mildly reduced). The left atrium is moderate to severely dilated. Increased mitral inflow velocities related to occlusion from left atrial mass. Leaflets appear normal. 4x4x4 cm mobile inhomogeneous left atrial mass. Appears to be attached to interatrial " septum, most consistent with left atrial myxoma. Cannot exclude associated thrombus.Extends through mitral valve plane during diastole. Mildly decreased right ventricular systolic function    Radiology: All imaging studies reviewed by me.    Chest X-Ray, 8/30/22:  Poststernotomy changes with mediastinal and right pleural chest tubes. Patient's been extubated. Right IJ cordis is at the innominate vein confluence. No pneumothorax. Linear atelectasis in the left lateral costophrenic angle. Right lung is clear. Heart and pulmonary vascularity are normal.

## 2022-08-31 NOTE — PLAN OF CARE
Problem: Respiratory Compromise (Cardiovascular Surgery)  Goal: Effective Oxygenation and Ventilation (Cardiovascular Surgery)  Outcome: Ongoing, Progressing  Intervention: Promote Airway Secretion Clearance  Recent Flowsheet Documentation  Taken 8/31/2022 0810 by Gabriela Cade, RT  Administration (IS):   self-administered   instruction provided, follow-up  Level Incentive Spirometer (mL): 1000  Cough And Deep Breathing: done independently per patient  Number of Repetitions (IS): 8  Patient Tolerance (IS): good    Gabriela Cade, RT

## 2022-08-31 NOTE — PROGRESS NOTES
RESPIRATORY CARE NOTE     Pt. independent with IS (2458-3512) and flutter valve, BS clear/ diminished, 2L  NC sats 98%, titrate oxygen as tolerated. Encourage pt. to take deep breaths and cough      Gabriela Cade, RT

## 2022-09-01 ENCOUNTER — APPOINTMENT (OUTPATIENT)
Dept: OCCUPATIONAL THERAPY | Facility: HOSPITAL | Age: 57
End: 2022-09-01
Payer: COMMERCIAL

## 2022-09-01 LAB
ANION GAP SERPL CALCULATED.3IONS-SCNC: 4 MMOL/L (ref 5–18)
CALCIUM, IONIZED MEASURED: 1.17 MMOL/L (ref 1.11–1.3)
CHLORIDE BLD-SCNC: 107 MMOL/L (ref 98–107)
CO2 SERPL-SCNC: 29 MMOL/L (ref 22–31)
GLUCOSE BLDC GLUCOMTR-MCNC: 130 MG/DL (ref 70–99)
ION CA PH 7.4: 1.22 MMOL/L (ref 1.11–1.3)
MAGNESIUM SERPL-MCNC: 1.6 MG/DL (ref 1.8–2.6)
PH: 7.48 (ref 7.35–7.45)
PHOSPHATE SERPL-MCNC: 1.5 MG/DL (ref 2.5–4.5)
PHOSPHATE SERPL-MCNC: 1.9 MG/DL (ref 2.5–4.5)
PHOSPHATE SERPL-MCNC: 2.3 MG/DL (ref 2.5–4.5)
POTASSIUM BLD-SCNC: 3.5 MMOL/L (ref 3.5–5)
POTASSIUM BLD-SCNC: 3.5 MMOL/L (ref 3.5–5)
SODIUM SERPL-SCNC: 140 MMOL/L (ref 136–145)

## 2022-09-01 PROCEDURE — 99233 SBSQ HOSP IP/OBS HIGH 50: CPT | Performed by: INTERNAL MEDICINE

## 2022-09-01 PROCEDURE — 36415 COLL VENOUS BLD VENIPUNCTURE: CPT | Performed by: THORACIC SURGERY (CARDIOTHORACIC VASCULAR SURGERY)

## 2022-09-01 PROCEDURE — 82330 ASSAY OF CALCIUM: CPT | Performed by: PHYSICIAN ASSISTANT

## 2022-09-01 PROCEDURE — 258N000003 HC RX IP 258 OP 636: Performed by: INTERNAL MEDICINE

## 2022-09-01 PROCEDURE — 36415 COLL VENOUS BLD VENIPUNCTURE: CPT | Performed by: INTERNAL MEDICINE

## 2022-09-01 PROCEDURE — 258N000003 HC RX IP 258 OP 636: Performed by: THORACIC SURGERY (CARDIOTHORACIC VASCULAR SURGERY)

## 2022-09-01 PROCEDURE — 250N000013 HC RX MED GY IP 250 OP 250 PS 637: Performed by: INTERNAL MEDICINE

## 2022-09-01 PROCEDURE — 80051 ELECTROLYTE PANEL: CPT | Performed by: INTERNAL MEDICINE

## 2022-09-01 PROCEDURE — 84100 ASSAY OF PHOSPHORUS: CPT | Performed by: THORACIC SURGERY (CARDIOTHORACIC VASCULAR SURGERY)

## 2022-09-01 PROCEDURE — 84132 ASSAY OF SERUM POTASSIUM: CPT | Performed by: INTERNAL MEDICINE

## 2022-09-01 PROCEDURE — 250N000009 HC RX 250: Performed by: THORACIC SURGERY (CARDIOTHORACIC VASCULAR SURGERY)

## 2022-09-01 PROCEDURE — 84100 ASSAY OF PHOSPHORUS: CPT | Performed by: INTERNAL MEDICINE

## 2022-09-01 PROCEDURE — 97110 THERAPEUTIC EXERCISES: CPT | Mod: GO

## 2022-09-01 PROCEDURE — 99232 SBSQ HOSP IP/OBS MODERATE 35: CPT | Performed by: INTERNAL MEDICINE

## 2022-09-01 PROCEDURE — 250N000011 HC RX IP 250 OP 636: Performed by: PHYSICIAN ASSISTANT

## 2022-09-01 PROCEDURE — 83735 ASSAY OF MAGNESIUM: CPT | Performed by: INTERNAL MEDICINE

## 2022-09-01 PROCEDURE — 250N000009 HC RX 250: Performed by: INTERNAL MEDICINE

## 2022-09-01 PROCEDURE — 200N000001 HC R&B ICU

## 2022-09-01 PROCEDURE — 250N000013 HC RX MED GY IP 250 OP 250 PS 637: Performed by: PHYSICIAN ASSISTANT

## 2022-09-01 PROCEDURE — 258N000003 HC RX IP 258 OP 636: Performed by: PHYSICIAN ASSISTANT

## 2022-09-01 PROCEDURE — 97535 SELF CARE MNGMENT TRAINING: CPT | Mod: GO

## 2022-09-01 RX ORDER — FUROSEMIDE 10 MG/ML
20 INJECTION INTRAMUSCULAR; INTRAVENOUS 2 TIMES DAILY
Status: DISCONTINUED | OUTPATIENT
Start: 2022-09-01 | End: 2022-09-04

## 2022-09-01 RX ORDER — POTASSIUM CHLORIDE 1500 MG/1
20 TABLET, EXTENDED RELEASE ORAL ONCE
Status: COMPLETED | OUTPATIENT
Start: 2022-09-01 | End: 2022-09-01

## 2022-09-01 RX ORDER — MAGNESIUM OXIDE 400 MG/1
400 TABLET ORAL EVERY 4 HOURS
Status: COMPLETED | OUTPATIENT
Start: 2022-09-01 | End: 2022-09-01

## 2022-09-01 RX ORDER — TRAMADOL HYDROCHLORIDE 100 MG/1
100 TABLET, EXTENDED RELEASE ORAL DAILY
Status: DISCONTINUED | OUTPATIENT
Start: 2022-09-01 | End: 2022-09-01 | Stop reason: ALTCHOICE

## 2022-09-01 RX ORDER — TRAMADOL HYDROCHLORIDE 50 MG/1
50 TABLET ORAL EVERY 8 HOURS PRN
Status: DISCONTINUED | OUTPATIENT
Start: 2022-09-01 | End: 2022-09-04 | Stop reason: HOSPADM

## 2022-09-01 RX ADMIN — POLYETHYLENE GLYCOL 3350 17 G: 17 POWDER, FOR SOLUTION ORAL at 08:54

## 2022-09-01 RX ADMIN — OXYCODONE HYDROCHLORIDE 10 MG: 5 TABLET ORAL at 00:05

## 2022-09-01 RX ADMIN — HEPARIN SODIUM 5000 UNITS: 5000 INJECTION, SOLUTION INTRAVENOUS; SUBCUTANEOUS at 05:25

## 2022-09-01 RX ADMIN — SENNOSIDES AND DOCUSATE SODIUM 1 TABLET: 8.6; 5 TABLET ORAL at 08:54

## 2022-09-01 RX ADMIN — TRAMADOL HYDROCHLORIDE 50 MG: 50 TABLET, COATED ORAL at 17:48

## 2022-09-01 RX ADMIN — OXYCODONE HYDROCHLORIDE 10 MG: 5 TABLET ORAL at 22:20

## 2022-09-01 RX ADMIN — Medication 400 MG: at 06:12

## 2022-09-01 RX ADMIN — HEPARIN SODIUM 5000 UNITS: 5000 INJECTION, SOLUTION INTRAVENOUS; SUBCUTANEOUS at 12:40

## 2022-09-01 RX ADMIN — ACETAMINOPHEN 975 MG: 325 TABLET ORAL at 22:20

## 2022-09-01 RX ADMIN — KETOROLAC TROMETHAMINE 30 MG: 30 INJECTION, SOLUTION INTRAMUSCULAR; INTRAVENOUS at 09:00

## 2022-09-01 RX ADMIN — TRAMADOL HYDROCHLORIDE 50 MG: 50 TABLET, COATED ORAL at 10:22

## 2022-09-01 RX ADMIN — Medication 400 MG: at 10:22

## 2022-09-01 RX ADMIN — ACETAMINOPHEN 975 MG: 325 TABLET ORAL at 05:23

## 2022-09-01 RX ADMIN — SODIUM PHOSPHATE, MONOBASIC, MONOHYDRATE 15 MMOL: 276; 142 INJECTION, SOLUTION INTRAVENOUS at 06:18

## 2022-09-01 RX ADMIN — LIDOCAINE PATCH 4% 2 PATCH: 40 PATCH TOPICAL at 08:54

## 2022-09-01 RX ADMIN — FUROSEMIDE 20 MG: 10 INJECTION, SOLUTION INTRAMUSCULAR; INTRAVENOUS at 06:11

## 2022-09-01 RX ADMIN — Medication 25 MCG: at 09:01

## 2022-09-01 RX ADMIN — Medication 1 TABLET: at 09:01

## 2022-09-01 RX ADMIN — SENNOSIDES AND DOCUSATE SODIUM 1 TABLET: 8.6; 5 TABLET ORAL at 20:10

## 2022-09-01 RX ADMIN — ACETAMINOPHEN 975 MG: 325 TABLET ORAL at 14:54

## 2022-09-01 RX ADMIN — SODIUM PHOSPHATE, MONOBASIC, MONOHYDRATE 15 MMOL: 276; 142 INJECTION, SOLUTION INTRAVENOUS at 16:02

## 2022-09-01 RX ADMIN — B-COMPLEX W/ C & FOLIC ACID TAB 1 TABLET: TAB at 08:54

## 2022-09-01 RX ADMIN — POTASSIUM CHLORIDE 20 MEQ: 1500 TABLET, EXTENDED RELEASE ORAL at 06:12

## 2022-09-01 RX ADMIN — HEPARIN SODIUM 5000 UNITS: 5000 INJECTION, SOLUTION INTRAVENOUS; SUBCUTANEOUS at 22:20

## 2022-09-01 RX ADMIN — LEVOTHYROXINE SODIUM 175 MCG: 0.03 TABLET ORAL at 06:34

## 2022-09-01 RX ADMIN — Medication 250 MG: at 08:54

## 2022-09-01 RX ADMIN — EPINEPHRINE 0.02 MCG/KG/MIN: 1 INJECTION INTRAMUSCULAR; INTRAVENOUS; SUBCUTANEOUS at 06:25

## 2022-09-01 RX ADMIN — Medication 1000 MCG: at 08:54

## 2022-09-01 RX ADMIN — PANTOPRAZOLE SODIUM 40 MG: 40 TABLET, DELAYED RELEASE ORAL at 08:54

## 2022-09-01 RX ADMIN — BISACODYL 10 MG: 10 SUPPOSITORY RECTAL at 22:20

## 2022-09-01 ASSESSMENT — ACTIVITIES OF DAILY LIVING (ADL)
ADLS_ACUITY_SCORE: 26

## 2022-09-01 NOTE — PROGRESS NOTES
"CLINICAL NUTRITION SERVICES - ASSESSMENT NOTE     Nutrition Prescription    RECOMMENDATIONS FOR MDs/PROVIDERS TO ORDER:      Malnutrition Status:    Not noted    Recommendations already ordered by Registered Dietitian (RD):  Start Ensure Enlive daily  Diet ed complete.  More diet ed available in cardiac rehab    Future/Additional Recommendations:       REASON FOR ASSESSMENT  Torrie Meyer is a/an 57 year old female assessed by the dietitian for LOS    NUTRITION HISTORY  Pt admitted with left atrium myxoma.  Hx hypothyroidism, recent afib.    CURRENT NUTRITION ORDERS  Diet: Regular  Intake/Tolerance: eating up to 50% at meals.    LABS  fMagnesium 1.6  Phosphorus 1.5  Above electrolytes replaced per protocol  -142  Labs reviewed    MEDICATIONS    acetaminophen  975 mg Oral Q8H     cyanocobalamin  1,000 mcg Oral Daily     furosemide  20 mg Intravenous BID     heparin ANTICOAGULANT  5,000 Units Subcutaneous Q8H     levothyroxine  175 mcg Oral QAM AC     lidocaine  2 patch Transdermal Q24H     lidocaine   Transdermal Q8H     multivitamin w/minerals  1 tablet Oral Daily     pantoprazole  40 mg Oral or NG Tube Daily    Or     pantoprazole  40 mg Oral Daily     polyethylene glycol  17 g Oral Daily     senna-docusate  1 tablet Oral BID     sodium chloride (PF)  3 mL Intracatheter Q8H     vitamin B complex with vitamin C  1 tablet Oral Daily     vitamin C  250 mg Oral Daily     Vitamin D3  25 mcg Oral Daily         acetaminophen, bisacodyl, calcium gluconate, calcium gluconate, calcium gluconate, glucose **OR** dextrose **OR** glucagon, hydrALAZINE, ketorolac, lactated ringers, lidocaine 4%, lidocaine (buffered or not buffered), magnesium hydroxide, naloxone **OR** naloxone **OR** naloxone **OR** naloxone, ondansetron **OR** ondansetron, oxyCODONE **OR** oxyCODONE, prochlorperazine **OR** prochlorperazine, sodium chloride (PF), traMADol   Medications reviewed    ANTHROPOMETRICS  Height: 154.9 cm (5' 1\")  Most Recent " Weight: 65.5 kg (144 lb 8 oz)    BMI: Overweight BMI 25-29.9  Weight History:   Wt Readings from Last 6 Encounters:   09/01/22 65.5 kg (144 lb 8 oz)   08/18/22 62.1 kg (137 lb)   132 lb 8/23/22, 140 lb 4/19/22    Dosing Weight: 60 kg, admit wt    ASSESSED NUTRITION NEEDS  Estimated Energy Needs: 1500+ kcals/day (25+)  Justification: Post-op  Estimated Protein Needs: 60+ grams protein/day (1+)  Justification: Post-op  Estimated Fluid Needs: 1500+ mL/day (1 mL/kcal)   Justification: Maintenance    PHYSICAL FINDINGS  See malnutrition section below.  Surgical incisions.       MALNUTRITION:  % Weight Loss:  None noted  % Intake:  Decreased intake does not meet criteria for malnutrition 3 days of decreased intake d/t surgery  Subcutaneous Fat Loss:  None observed  Muscle Loss:  None observed  Fluid Retention:  None noted    Malnutrition Diagnosis: Patient does not meet two of the above criteria necessary for diagnosing malnutrition      NUTRITION DIAGNOSIS  Nutrition Knowledge deficit related to heart disease as evidenced by need for therapeutic diet      INTERVENTIONS  Implementation  Nutrition Education: Reviewed heart healthy diet today with pt.  Provided written materials.   Start Ensure Enlive as pt's po intake is decreased after surgery.     Goals  Participate in diet ed-met  Electrolytes WNL  Patient to consume % of nutritionally adequate meals three times per day, or the equivalent with supplements/snacks.     Monitoring/Evaluation  Progress toward goals will be monitored and evaluated per protocol.

## 2022-09-01 NOTE — PROGRESS NOTES
CVTS Daily Progress Note   POD#3 s/p left atrial mass removal, PVI/LAAE  Attending: Facundo  LOS: 9    SUBJECTIVE/INTERVAL EVENTS:    No acute events overnight. Remains on epi this morning; normotensive/HTN. Paced at 90bpm; underlying rhythm very bradycardic (appears junctional in 30s). Patient progressing well otherwise. Maintaining oxygen saturations on nasal cannula. Up to chair this AM and ambulating with therapy. Pain well controlled. - BM / +flatus. Tolerating diet without nausea. UOP adequate. Chest tube output appropriate. Patient denies new chest pain, shortness of breath, abdominal pain, calf pain, nausea. Patient has no questions today.    OBJECTIVE:  Temp:  [98.2  F (36.8  C)-98.4  F (36.9  C)] 98.3  F (36.8  C)  Pulse:  [89] 89  Resp:  [8-25] 8  MAP:  [71 mmHg-92 mmHg] 90 mmHg  Arterial Line BP: ()/(56-74) 122/71  SpO2:  [85 %-100 %] 100 %  Resp: 8    Vitals:    08/28/22 0801 08/29/22 0647 08/30/22 0615 08/31/22 0600   Weight: 59.4 kg (130 lb 14.4 oz) 58.7 kg (129 lb 4.8 oz) 70.9 kg (156 lb 6.4 oz) 67.6 kg (149 lb 1.6 oz)    09/01/22 0630   Weight: 65.5 kg (144 lb 8 oz)       Current Medications:    Scheduled Meds:    acetaminophen  975 mg Oral Q8H     cyanocobalamin  1,000 mcg Oral Daily     furosemide  20 mg Intravenous TID     heparin ANTICOAGULANT  5,000 Units Subcutaneous Q8H     levothyroxine  175 mcg Oral QAM AC     lidocaine  2 patch Transdermal Q24H     lidocaine   Transdermal Q8H     magnesium oxide  400 mg Oral Q4H     multivitamin w/minerals  1 tablet Oral Daily     pantoprazole  40 mg Oral or NG Tube Daily    Or     pantoprazole  40 mg Oral Daily     polyethylene glycol  17 g Oral Daily     senna-docusate  1 tablet Oral BID     sodium chloride (PF)  3 mL Intracatheter Q8H     sodium phosphate  15 mmol Intravenous Once     vitamin B complex with vitamin C  1 tablet Oral Daily     vitamin C  250 mg Oral Daily     Vitamin D3  25 mcg Oral Daily     Continuous Infusions:    PRN  Meds:.acetaminophen, bisacodyl, calcium gluconate, calcium gluconate, calcium gluconate, glucose **OR** dextrose **OR** glucagon, hydrALAZINE, ketorolac, lactated ringers, lidocaine 4%, lidocaine (buffered or not buffered), magnesium hydroxide, naloxone **OR** naloxone **OR** naloxone **OR** naloxone, ondansetron **OR** ondansetron, oxyCODONE **OR** oxyCODONE, prochlorperazine **OR** prochlorperazine, sodium chloride (PF)    Cardiographics:    Telemetry monitoring demonstrates paced rhythm at 90bpm per my personal review.    Imaging:  Results for orders placed or performed during the hospital encounter of 08/23/22   XR Chest 2 Views    Impression    IMPRESSION: Right posterior costophrenic angle is clipped on the lateral view. Lungs are clear. Heart and pulmonary vascularity are normal. No signs of acute disease.   US Carotid Bilateral    Impression    IMPRESSION:  1.  Mild plaque formation, velocities consistent with less than 50% stenosis in the right internal carotid artery.  2.  Mild plaque formation, velocities consistent with less than 50% stenosis in the left internal carotid artery.  3.  Flow within the vertebral arteries is antegrade.   XR Chest Port 1 View    Impression    IMPRESSION: Postoperative changes after cardiac surgery with sternal wires and mediastinal clips present. Heart size and pulmonary vessels are normal. Lungs clear. No pneumothorax. Tubes and catheters appear in good position.   XR Chest Port 1 View    Impression    IMPRESSION: Poststernotomy changes with mediastinal and right pleural chest tubes. Patient's been extubated. Right IJ cordis is at the innominate vein confluence. No pneumothorax.    Linear atelectasis in the left lateral costophrenic angle. Right lung is clear. Heart and pulmonary vascularity are normal.       Labs, personally reviewed.  Hemoglobin   Date Value Ref Range Status   08/30/2022 9.2 (L) 11.7 - 15.7 g/dL Final   08/29/2022 10.0 (L) 11.7 - 15.7 g/dL Final      Comment:     This result has been called to SEE COMM LOG by Jennifer Lowery on 08 29 2022 at 2356, and has not been read back.    08/29/2022 13.5 11.7 - 15.7 g/dL Final     WBC Count   Date Value Ref Range Status   08/30/2022 10.5 4.0 - 11.0 10e3/uL Final   08/29/2022 13.6 (H) 4.0 - 11.0 10e3/uL Final   08/29/2022 12.0 (H) 4.0 - 11.0 10e3/uL Final     Platelet Count   Date Value Ref Range Status   08/30/2022 128 (L) 150 - 450 10e3/uL Final   08/29/2022 135 (L) 150 - 450 10e3/uL Final   08/29/2022 156 150 - 450 10e3/uL Final     Creatinine   Date Value Ref Range Status   08/31/2022 0.57 (L) 0.60 - 1.10 mg/dL Final   08/30/2022 0.71 0.60 - 1.10 mg/dL Final   08/29/2022 0.73 0.60 - 1.10 mg/dL Final     Potassium   Date Value Ref Range Status   09/01/2022 3.5 3.5 - 5.0 mmol/L Final   08/31/2022 4.1 3.5 - 5.0 mmol/L Final   08/30/2022 4.0 3.5 - 5.0 mmol/L Final     Magnesium   Date Value Ref Range Status   09/01/2022 1.6 (L) 1.8 - 2.6 mg/dL Final   08/31/2022 1.8 1.8 - 2.6 mg/dL Final   08/30/2022 1.7 (L) 1.8 - 2.6 mg/dL Final          I/O:  I/O last 3 completed shifts:  In: 450.02 [P.O.:350; I.V.:100.02]  Out: 4520 [Urine:4350; Chest Tube:170]       Physical Exam:    General: Patient seen up in chair. NAD. Conversant. Pleasant.   CV: RRR on monitor. 2+ peripheral pulses in all extremities. Mild edema.   Pulm: Non-labored effort on nasal cannula. Chest tubes in place, serosanguinous output, no air leak. Incision C/D/I.  Abd: Soft, NT, ND  : Arreaga with jean paul urine  Ext: Mild pedal edema, SCDs in place, warm, distal pulses intact  Neuro: CNs grossly intact.      ASSESSMENT/PLAN:    Torrie Meyer is a 57 year old female with a history of CAD and a-fib who is POD#3 s/p left atrial mass removal, PVI/LAAE.    Principal Problem:    Atrial myxoma  Active Problems:    Atrial fibrillation with slow ventricular response (H)    Acquired hypothyroidism    Hypomagnesemia    Short of breath on exertion    Left atrial  mass        NEURO:   - Scheduled Tylenol and PRN Tylenol/oxycodone/lidocaine patches/Toradol for pain    CV:   - Normotensive/HTN on epi; turn off  - No indication for ASA or statin  - Chest removed this AM without immediate complication  - Cardiology following for possible PPM this admission    PULM:   - Maintaining oxygen saturations on nasal cannula  - Encourage pulmonary toilet    FEN/GI:  - Continue electrolyte replacement protocol  - Diet: Regular  - Bowel regimen  - PTA MVI, vitamins B12, B, C, D3    RENAL:  - Adequate UOP/hr. Continue to monitor closely.  - Discontinue Arreaga  - Diuresis with Lasix 20mg IV BID (decreased)    HEME:  - Acute blood loss anemia post-op.   - No bleeding concerns. Hep SQ, ASA    ID:  - Suzanne op ppx complete, afebrile. No concerns for infection    ENDO:   - No issues with blood sugars; discontinue SSI  - PTA Synthroid  - PTA Fosamax    PPx:   - DVT: SCDs, SQ heparin TID, ambulation   - GI: Protonix 40mg PO daily    DISPO:   - Continue critical care in ICU due to pacer need        Patient discussed with Dr. Loredo.        _______  GARRISON VoraC  Cardiothoracic Surgery  [phone]

## 2022-09-01 NOTE — PROGRESS NOTES
Care Management Follow Up    Length of Stay (days): 9    Expected Discharge Date: 09/04/2022       Concerns to be Addressed:   Postoperative care and recovery (see OP note of 8/29): Chest tube maintenance, Lasix IV three times a day.  Patient plan of care discussed at interdisciplinary rounds: Yes  Follow up from rounds/notes:   Junctional rhythm, 100% paced. Intensivist will sign off and Hospitalist/CV surgery assume care.     Anticipated Discharge Disposition:  Home     Anticipated Discharge Services:  Therapy agrees with a home discharge, family support.  Anticipated Discharge DME:  Per therapy (if indicated).    Patient/family educated on Medicare website which has current facility and service quality ratings:  NA  Education Provided on the Discharge Plan:  Per team  Patient/Family in Agreement with the Plan:  yes    Referrals Placed by CM/SW:  none  Private pay costs discussed: Not applicable     Additional Information:  Patient lives in a house with her fiyi Cifuentes and her 17 year old son.  She is independent with all activities of daily living at baseline and drives and works part time. Her goal is to return home at discharge and therapy agrees with the plan. Care management will continue to watch team recommendations. Anticipate family to transport at discharge.  11:17 AM:  Received a request to follow up with patient for care management services. Patient was curious about what bills she might have for this hospital stay. Writer explained that care management does not have any way to determine this. The bill will be compiled after patient's discharge and sent to her insurance company for review. Patient expressed understanding of this. Her goal is home with family support and outpatient follow up.     Noreen Veliz RN

## 2022-09-01 NOTE — PROGRESS NOTES
[facility]:  PULMONARY/CRITICAL CARE PROGRESS NOTE     Date / Time of Admission:  8/23/2022  6:58 PM    ID: Torrie Meyer is a 57 year old female, never smoker, with hypothyroidism, recent diagnosis of atrial fibrillation who presented to [facility] ED on 8/23/2022  with a newly identified left atrium myxoma and cardiac systolic dysfunction (LVEF 40-45%, mild RV dysfunction) on outpatient echocardiogram.  Underwent surgical removal of left atrial myxoma and reconstruction of atrial septum on 8/29/2022 with Dr. Loredo, admitted to the ICU for postoperative management.    Reason for Consult: Postoperative ICU management         Assessment: 1.   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 Found a large 4 x 4 x 4 cm mobile homogenous left atrial mass on outpatient TTE for Afib workup.  MARII confirmed 3 - 4 - 4.5 cm mass attached to atrial septum. Minimal symptoms aside from some dyspnea with exertion. Went to OR with Dr. Loredo 8/29 for extended transseptal approach and removal of a left atrial myxoma; reconstruction of the atrial septum with autologous pericardium; bilateral pulmonary vein isolation; excision of left atrial appendage.  mL.    2. Circulatory/cardiogenic shock with mild systolic dysfunction - resolved.  TTE 8/24 with LVEF 40-45%, mildly decreased RV systolic fxn.  Coronary angiogram 8/25 unremarkable.  Hypotension in post-operative state. Transitioned off neosynephrine gtt on 8/30.  Transitioned off epinephrine gtt on 9/1.   3. Paroxysmal atrial fibrillation s/p b/l pulmonary vein isolation and HEATHER ligation 8/29/22.  Patient found to have atrial fibrillation on screening EKG for colonoscopy earlier this month, which prompted TTE evaluation.  Went to OR 8/29 for LA myxoma removal, also had bilateral pulmonary vein isolation and excision of left atrial appendage.  4. Sinus arrest, bradycardia requiring atrial  pacing.  Currently 100% paced with temporary wires. Consideration for pacemaker if persistent.  5. Acute respiratory failure with hypoxia, improved.  Intubated 8/29 for OR, metabolic acidosis delayed extubation, given 2 amp bicarb, later extubated 23:58 hr. Currently on 2 L/min NC.   6. Electrolyte abnormalities: Hypomagnesemia, hypophosphatemia.   7. Normocytic anemia, anemia of acute blood loss, thrombocytopenia.   mL in OR, given 670 mL Cellsaver.   8. Primary hypothyroidism. TSH 78.80 on 8/17 but free T4 normal at 1.04 consistent with subclinical hypothyroidism.   9. Recurrent emesis, improved.  Last event yesterday afternoon.    Lines/tubes:  Right IJ CVC double-lumen catheter, placed 8/29 - removed today  Arreaga catheter, placed 8/29  32 Hebrew mediastinal chest tube, placed 8/29  24 Hebrew right pleural chest tube, placed 8/29    CODE STATUS:  Full Code    Clinically Significant Risk Factors Present on Admission                            Plan:     Pain control: Tylenol Q8H, PRNs    SBP goal> 90 mmHg, MAP goal > 65 mmHg    Currently 100% atrial paced, no adjustments of pacemaker HR settings at this time.      Patient has baseline low HR. Hopefully will have independent recovery and not require a permanent pacemaker.    Cardiology following for possible pacemaker.     Patient had questions about pacemaker procedure/logistics. We reviewed but will ask Cards team to also educate further on downstream process.     Continue ASA daily    Pulmonary hygiene: Encourage IS Q2H while awake, OOB to chair, cardiac rehab as tolerates    Diuresis and chest tube management per CT surgery team    Regular diet. ADAT, monitor for emesis.     PPI for GI prophylaxis, heparin subcutaneous for DVT prophylaxis    Patient and/or family were educated on the above recommendations.   Thank you for allowing our service to participate in the care of this patient.  Please call with any questions or concerns. Critical Care Service  will sign off.  Primary care will be transferred to the CT surgery team (Dr. Loredo as attending) for continued management.  No additional consultants required.    Total patient care time: 35 minutes, with over 50% spent in counseling/coordination of care.      Danna Roper MD, MPH  Pulmonary/Critical Care Medicine  09/01/2022  10:20 AM        Subjective/Interval History:   8/29: Went to OR with Dr. Loredo.  Acidotic after wean - pH 7.29. Given 2 amp bicarb, repeat ABG 1 hr later with pH 7.32.  Extubated ~ 23:58 hr.    8/30: Titrated off phenylephrine gtt. Recurrent emesis events.  Tried to bring pacing down had hypotension.    8/31: Kept on epi gtt @ 0.02. 100% A paced still.  Cards consulted.     Overnight, no issues. RIJ catheter removed today.   Patient reports doing well but doesn't like when people turn down her pacing settings as she gets symptomatic.  Is slightly anxious about how insurance works and would like to speak with care management.   Working on IS in the bed/chair.     Review of Systems:  Pertinent items are noted in HPI.  The Review of Systems is negative other than noted in the HPI        Allergies/Medications:   Allergies:   No Known Allergies    Continuous Infusions:    Scheduled Medications:    acetaminophen  975 mg Oral Q8H     cyanocobalamin  1,000 mcg Oral Daily     furosemide  20 mg Intravenous BID     heparin ANTICOAGULANT  5,000 Units Subcutaneous Q8H     levothyroxine  175 mcg Oral QAM AC     lidocaine  2 patch Transdermal Q24H     lidocaine   Transdermal Q8H     magnesium oxide  400 mg Oral Q4H     multivitamin w/minerals  1 tablet Oral Daily     pantoprazole  40 mg Oral or NG Tube Daily    Or     pantoprazole  40 mg Oral Daily     polyethylene glycol  17 g Oral Daily     senna-docusate  1 tablet Oral BID     sodium chloride (PF)  3 mL Intracatheter Q8H     vitamin B complex with vitamin C  1 tablet Oral Daily     vitamin C  250 mg Oral Daily     Vitamin D3  25 mcg Oral Daily     "        Objective:   Vitals:  /79   Pulse 89   Temp 98.3  F (36.8  C) (Oral)   Resp 8   Ht 1.549 m (5' 1\")   Wt 65.5 kg (144 lb 8 oz)   LMP  (Approximate)   SpO2 100%   BMI 27.30 kg/m    GEN: Pleasant female, sitting in the chair in no acute distress  HEENT: Normocephalic, atraumatic.  Extraoccular eye movements intact, anicteric sclera. Moist mucous membranes.   NECK: Supple.    CHEST: Chest tubes to suction, no airleak.  Sternal incision without bleeding/drainage.  PULM: Non-labored breathing.  No use of accessory muscles.  Clear to ausculation bilaterally.   CVS: Paced rhythm, occasionally with atrial fibrillation. Normal S1, S2.  No rubs, murmurs, or gallops.    ABDOMEN: Normoactive bowel sounds.  Non-distended.    EXTREMITES:  No clubbing, cyanosis, or edema.    NEURO:  Awake.  Oriented to person, place, time and situation.  Cranial nerves 2-12 grossly intact.  Moving all extremities.      Intake/Output:  I/O last 3 completed shifts:  In: 450.02 [P.O.:350; I.V.:100.02]  Out: 4520 [Urine:4350; Chest Tube:170]        Pertinent Studies:   All laboratory data reviewed  Serum Glucose range:   Recent Labs   Lab 09/01/22  0753 08/31/22  2110 08/31/22  1558 08/31/22  1217 08/31/22  0746 08/31/22  0456   * 142* 121* 123* 144* 114     ABG:   Recent Labs   Lab 09/01/22  0504 08/31/22  0747 08/30/22  2021 08/30/22  1228 08/30/22  0511 08/30/22  0510 08/30/22  0039 08/29/22  2304 08/29/22  2134 08/29/22  1709 08/29/22  1709   PH 7.48* 7.43 7.44   < > 7.38   < > 7.32* 7.32* 7.29*  --  7.34*   PCO2  --   --   --   --  41  --  43 44 40  --  40   PO2  --   --   --   --  126*  --  100* 95* 123*  --  310*   HCO3  --   --   --   --  24  --  21* 22* 19*   < >  --    O2PER  --   --   --   --   --   --   --  0.3 0.3  --  0.7    < > = values in this interval not displayed.     CBC:   Recent Labs   Lab 08/30/22  0510 08/29/22  2303 08/29/22  1645 08/29/22  1533   WBC 10.5  --  13.6* 12.0*   HGB 9.2* 10.0* 13.5 " 10.4*   HCT 26.4*  --  38.9 29.6*   MCV 95  --  95 95   *  --  135* 156     Chemistry:   Recent Labs   Lab 09/01/22  0503 08/31/22  0456 08/30/22  0510 08/30/22  0040 08/29/22  1645    135* 147*  --  141   POTASSIUM 3.5  3.5 4.1 4.0   < > 3.1*   CHLORIDE 107 106 116*  --  114*   CO2 29 24 26  --  21*   BUN  --  9 13  --  13   CR  --  0.57* 0.71  --  0.73   GFRESTIMATED  --  >90 >90  --  >90   YEVGENIY  --  7.9* 7.6*  --  8.0*   MAG 1.6* 1.8 1.7*  --  2.9*   PROTTOTAL  --   --  4.9*  --  5.0*   ALBUMIN  --   --  3.8  --  3.6   AST  --   --  56*  --  47*   ALT  --   --  26  --  27   ALKPHOS  --   --  27*  --  36*   BILITOTAL  --   --  0.6  --  0.8    < > = values in this interval not displayed.     Coags:  Recent Labs   Lab 08/29/22  1645 08/29/22  1533 08/26/22  1057 08/26/22  0641   INR 1.39* 1.44*  --  1.12   PTT 87* 42* 62*  --      Cardiac Markers:  No results for input(s): CKTOTAL, TROPONINI in the last 168 hours.     Microbiology:  MRSA nasal swab, 8/26: Negative  COVID-19 PCR, 8/23: Negative        Cardiology/Radiology:   Cardiac: All cardiac studies reviewed by me.    EKG:  Reviewed    MARII, 8/24/22:  Summary:  1. The left ventricle is normal in size. Left ventricular systolic performance is mild to moderately reduced. The ejection fraction is estimated to be 40- 45%. 2. There is mild to moderate global reduction in left ventricular systolic performance. 3. There is a 3 x 4 x 4.5 cm mass in the left atrium attached to the atrial septum. The echocardiographic features and anatomic location are paradigmatic of an atrial myxoma. The mass moves forward in diastole abutting the mitral valve leaflets and extends through the mitral annular valve plane. 4. Mild right ventricular enlargement with low normal right ventricular systolic performance. 5. There is moderate left atrial enlargement. There is mild to moderate right atrial enlargement. 6. No thrombus is detected within the left atrial appendage. 7.  "Color Doppler interrogation the atrial septum suggest the presence of a small patent foramen ovale (PFO). No right to left shunting, however, is detected with echo contrast (\"bubble\") study.    TTE, 8/18/22:  Summary:  Left ventricular function is decreased. The ejection fraction is 40-45% (mildly reduced). The left atrium is moderate to severely dilated. Increased mitral inflow velocities related to occlusion from left atrial mass. Leaflets appear normal. 4x4x4 cm mobile inhomogeneous left atrial mass. Appears to be attached to interatrial septum, most consistent with left atrial myxoma. Cannot exclude associated thrombus.Extends through mitral valve plane during diastole. Mildly decreased right ventricular systolic function    Radiology: All imaging studies reviewed by me.    Chest X-Ray, 8/30/22:  Poststernotomy changes with mediastinal and right pleural chest tubes. Patient's been extubated. Right IJ cordis is at the innominate vein confluence. No pneumothorax. Linear atelectasis in the left lateral costophrenic angle. Right lung is clear. Heart and pulmonary vascularity are normal.         "

## 2022-09-01 NOTE — PLAN OF CARE
[facility] - ICU    RN Progress Note:            Pertinent Assessments:      Please refer to flowsheet rows for full assessment     VSS/afebrile. O2 sats upper 90's on RA. Pain controlled with APAP, Tramadol and Toradol.         Mobility Level:     Level 5; pt up to chair x2 and walked in hallways x2 with rehab.         Key Events - This Shift:     Epi turned off this AM. With initial pacer check with CVS, pt's underlying rhythm was junctional in the 30's. At approx 1030, pt noted to be in Afib, rate 's. Pacer was placed on standby mode. Lines and Whitley dc'd. Pt's BP dropped after each walk to the 70-80's, but rebounded once back to bed or back to chair. Daphne Robbins updated; afternoon dose of Lasix held.              Plan:     Continue post-op pathway.      Claribel Coto RN

## 2022-09-01 NOTE — PROGRESS NOTES
"        Impression and Plan     Impression:   1. Left atrial myxoma s/p surgical resection  2. Sinus arrest - currently in atrial fibrillation with HR around 90 bpm.  3. Cardiogenic shock - postoperative from cardiac surgery - resolved.  4. Paroxysmal atrial fibrillation s/p bilateral PVI and HEATHER ligation on 8/29/22 (surgical)  5. Acute respiratory failure with hypoxia - improved.    Plan:    No need for pacemaker today.     Will continue to follow    Primary Cardiologist: Dr. Anahi Sawant MD    Subjective     Feeling better. Breathing okay. Off epinephrine.    Cardiac Diagnostics   Telemetry (personally reviewed): atrial fibrillation    Echocardiogram (results reviewed):   TTE 8/24/22  1. The left ventricle is normal in size. Left ventricular systolic performance is mild to moderately reduced. The ejection fraction is estimated to be 40-45%.  2. There is mild to moderate global reduction in left ventricular systolic performance.  3. There is a 3 x 4 x 4.5 cm mass in the left atrium attached to the atrial septum. The echocardiographic features and anatomic location are paradigmaticof an atrial myxoma. The mass moves forward in diastole abutting the mitral valve leaflets and extends through the mitral annular valve plane.  4. Mild right ventricular enlargement with low normal right ventricular systolic performance.  5. There is moderate left atrial enlargement. There is mild to moderate right atrial enlargement.  6. No thrombus is detected within the left atrial appendage.  7. Color Doppler interrogation the atrial septum suggest the presence of a small patent foramen ovale (PFO). No right to left shunting, however, is detected with echo contrast (\"bubble\") study.     Cardiac Cath (results reviewed): 8/25/22  57-year-old female with recent diagnosis of atrial myxoma, here for preoperative coronary angiography.   Coronary angiography showed no coronary artery disease with minimal atherosclerosis and a right dominant " "coronary system.   Calcified mass was noted on fluoroscopy, which likely represented the atrial myxoma    Physical Examination       /62   Pulse 90   Temp 98.4  F (36.9  C) (Oral)   Resp 22   Ht 1.549 m (5' 1\")   Wt 65.5 kg (144 lb 8 oz)   LMP  (Approximate)   SpO2 97%   BMI 27.30 kg/m            Intake/Output Summary (Last 24 hours) at 9/1/2022 1046  Last data filed at 9/1/2022 1020  Gross per 24 hour   Intake 334.26 ml   Output 5750 ml   Net -5415.74 ml       General: pleasant female. No acute distress.   HENT: external ears normal. Nares patent. Mucous membranes moist.  Eyes: perrla, extraocular muscles intact. No scleral icterus.   Neck: No JVD  Lungs: clear to auscultation  COR: normal rate, irregularly irregular rhythm, No murmurs, rubs, or gallops  Abd: nondistended, BS present  Extrem: No edema         Imaging      No new imaging.    Lab Results   Lab Results   Component Value Date    CHOL 187 08/23/2022    HDL 62 08/23/2022    TRIG 66 08/23/2022    BUN 9 08/31/2022     09/01/2022    CO2 29 09/01/2022       Lab Results   Component Value Date    WBC 10.5 08/30/2022    HGB 9.2 (L) 08/30/2022    HCT 26.4 (L) 08/30/2022    MCV 95 08/30/2022     (L) 08/30/2022       Lab Results   Component Value Date    TSH 3.57 08/31/2022    INR 1.39 (H) 08/29/2022               Current Inpatient Scheduled Medications   Scheduled Meds:    acetaminophen  975 mg Oral Q8H     cyanocobalamin  1,000 mcg Oral Daily     furosemide  20 mg Intravenous BID     heparin ANTICOAGULANT  5,000 Units Subcutaneous Q8H     levothyroxine  175 mcg Oral QAM AC     lidocaine  2 patch Transdermal Q24H     lidocaine   Transdermal Q8H     multivitamin w/minerals  1 tablet Oral Daily     pantoprazole  40 mg Oral or NG Tube Daily    Or     pantoprazole  40 mg Oral Daily     polyethylene glycol  17 g Oral Daily     senna-docusate  1 tablet Oral BID     sodium chloride (PF)  3 mL Intracatheter Q8H     vitamin B complex with " vitamin C  1 tablet Oral Daily     vitamin C  250 mg Oral Daily     Vitamin D3  25 mcg Oral Daily     Continuous Infusions:         Medications Prior to Admission   Prior to Admission medications    Medication Sig Start Date End Date Taking? Authorizing Provider   alendronate (FOSAMAX) 70 MG tablet TAKE 1 TABLET BY MOUTH 1 TIME A WEEK 12/11/21  Yes Reported, Patient   aspirin (ASA) 81 MG EC tablet Take 1 tablet (81 mg) by mouth daily 8/18/22  Yes Anahi Sawant MD   cyanocobalamin (VITAMIN B-12) 1000 MCG tablet Take 1,000 mcg by mouth daily   Yes Unknown, Entered By History   fish oil-omega-3 fatty acids 1000 MG capsule Take 1 g by mouth daily   Yes Unknown, Entered By History   levothyroxine (SYNTHROID/LEVOTHROID) 175 MCG tablet Take 175 mcg by mouth daily 2/2/22  Yes Reported, Patient   multivitamin w/minerals (THERA-VIT-M) tablet Take 1 tablet by mouth daily   Yes Unknown, Entered By History   vitamin B-Complex Take 1 tablet by mouth daily   Yes Unknown, Entered By History   vitamin C (ASCORBIC ACID) 250 MG tablet Take 250 mg by mouth daily   Yes Unknown, Entered By History   Vitamin D3 (CHOLECALCIFEROL) 25 mcg (1000 units) tablet Take 1 tablet by mouth daily   Yes Unknown, Entered By History

## 2022-09-02 ENCOUNTER — APPOINTMENT (OUTPATIENT)
Dept: OCCUPATIONAL THERAPY | Facility: HOSPITAL | Age: 57
End: 2022-09-02
Payer: COMMERCIAL

## 2022-09-02 LAB
ANION GAP SERPL CALCULATED.3IONS-SCNC: 7 MMOL/L (ref 5–18)
BUN SERPL-MCNC: 7 MG/DL (ref 8–22)
CALCIUM SERPL-MCNC: 8.6 MG/DL (ref 8.5–10.5)
CALCIUM, IONIZED MEASURED: 1.15 MMOL/L (ref 1.11–1.3)
CHLORIDE BLD-SCNC: 104 MMOL/L (ref 98–107)
CO2 SERPL-SCNC: 28 MMOL/L (ref 22–31)
CREAT SERPL-MCNC: 0.55 MG/DL (ref 0.6–1.1)
ERYTHROCYTE [DISTWIDTH] IN BLOOD BY AUTOMATED COUNT: 13.2 % (ref 10–15)
GFR SERPL CREATININE-BSD FRML MDRD: >90 ML/MIN/1.73M2
GLUCOSE BLD-MCNC: 103 MG/DL (ref 70–125)
HCT VFR BLD AUTO: 22.9 % (ref 35–47)
HGB BLD-MCNC: 8 G/DL (ref 11.7–15.7)
ION CA PH 7.4: 1.22 MMOL/L (ref 1.11–1.3)
MAGNESIUM SERPL-MCNC: 1.7 MG/DL (ref 1.8–2.6)
MCH RBC QN AUTO: 33.3 PG (ref 26.5–33)
MCHC RBC AUTO-ENTMCNC: 34.9 G/DL (ref 31.5–36.5)
MCV RBC AUTO: 95 FL (ref 78–100)
PH: 7.51 (ref 7.35–7.45)
PLATELET # BLD AUTO: 142 10E3/UL (ref 150–450)
POTASSIUM BLD-SCNC: 3.4 MMOL/L (ref 3.5–5)
POTASSIUM BLD-SCNC: 3.5 MMOL/L (ref 3.5–5)
RBC # BLD AUTO: 2.4 10E6/UL (ref 3.8–5.2)
SODIUM SERPL-SCNC: 139 MMOL/L (ref 136–145)
WBC # BLD AUTO: 8.4 10E3/UL (ref 4–11)

## 2022-09-02 PROCEDURE — 250N000013 HC RX MED GY IP 250 OP 250 PS 637: Performed by: PHYSICIAN ASSISTANT

## 2022-09-02 PROCEDURE — 83735 ASSAY OF MAGNESIUM: CPT | Performed by: INTERNAL MEDICINE

## 2022-09-02 PROCEDURE — 250N000013 HC RX MED GY IP 250 OP 250 PS 637: Performed by: STUDENT IN AN ORGANIZED HEALTH CARE EDUCATION/TRAINING PROGRAM

## 2022-09-02 PROCEDURE — 85027 COMPLETE CBC AUTOMATED: CPT | Performed by: PHYSICIAN ASSISTANT

## 2022-09-02 PROCEDURE — 250N000013 HC RX MED GY IP 250 OP 250 PS 637: Performed by: INTERNAL MEDICINE

## 2022-09-02 PROCEDURE — 82330 ASSAY OF CALCIUM: CPT | Performed by: PHYSICIAN ASSISTANT

## 2022-09-02 PROCEDURE — 80048 BASIC METABOLIC PNL TOTAL CA: CPT | Performed by: PHYSICIAN ASSISTANT

## 2022-09-02 PROCEDURE — 36415 COLL VENOUS BLD VENIPUNCTURE: CPT | Performed by: PHYSICIAN ASSISTANT

## 2022-09-02 PROCEDURE — 84132 ASSAY OF SERUM POTASSIUM: CPT | Performed by: PHYSICIAN ASSISTANT

## 2022-09-02 PROCEDURE — 250N000011 HC RX IP 250 OP 636: Performed by: PHYSICIAN ASSISTANT

## 2022-09-02 PROCEDURE — 97535 SELF CARE MNGMENT TRAINING: CPT | Mod: GO

## 2022-09-02 PROCEDURE — 99207 PR NO CHARGE LOS: CPT | Performed by: PHYSICIAN ASSISTANT

## 2022-09-02 PROCEDURE — 210N000001 HC R&B IMCU HEART CARE

## 2022-09-02 PROCEDURE — 250N000013 HC RX MED GY IP 250 OP 250 PS 637: Performed by: THORACIC SURGERY (CARDIOTHORACIC VASCULAR SURGERY)

## 2022-09-02 PROCEDURE — 97110 THERAPEUTIC EXERCISES: CPT | Mod: GO

## 2022-09-02 PROCEDURE — 99232 SBSQ HOSP IP/OBS MODERATE 35: CPT | Performed by: INTERNAL MEDICINE

## 2022-09-02 RX ORDER — POTASSIUM CHLORIDE 1500 MG/1
40 TABLET, EXTENDED RELEASE ORAL ONCE
Status: COMPLETED | OUTPATIENT
Start: 2022-09-02 | End: 2022-09-02

## 2022-09-02 RX ORDER — POTASSIUM CHLORIDE 1500 MG/1
20 TABLET, EXTENDED RELEASE ORAL ONCE
Status: COMPLETED | OUTPATIENT
Start: 2022-09-03 | End: 2022-09-03

## 2022-09-02 RX ORDER — MAGNESIUM OXIDE 400 MG/1
400 TABLET ORAL EVERY 4 HOURS
Status: COMPLETED | OUTPATIENT
Start: 2022-09-02 | End: 2022-09-02

## 2022-09-02 RX ORDER — MAGNESIUM SULFATE HEPTAHYDRATE 40 MG/ML
2 INJECTION, SOLUTION INTRAVENOUS ONCE
Status: COMPLETED | OUTPATIENT
Start: 2022-09-02 | End: 2022-09-02

## 2022-09-02 RX ADMIN — LIDOCAINE PATCH 4% 2 PATCH: 40 PATCH TOPICAL at 09:10

## 2022-09-02 RX ADMIN — MAGNESIUM HYDROXIDE 30 ML: 2400 SUSPENSION ORAL at 00:12

## 2022-09-02 RX ADMIN — Medication 5 MG: at 20:29

## 2022-09-02 RX ADMIN — Medication 5 MG: at 03:15

## 2022-09-02 RX ADMIN — ACETAMINOPHEN 975 MG: 325 TABLET ORAL at 20:30

## 2022-09-02 RX ADMIN — TRAMADOL HYDROCHLORIDE 50 MG: 50 TABLET, COATED ORAL at 05:38

## 2022-09-02 RX ADMIN — POTASSIUM & SODIUM PHOSPHATES POWDER PACK 280-160-250 MG 1 PACKET: 280-160-250 PACK at 13:12

## 2022-09-02 RX ADMIN — SODIUM PHOSPHATE, DIBASIC AND SODIUM PHOSPHATE, MONOBASIC 1 ENEMA: 7; 19 ENEMA RECTAL at 00:08

## 2022-09-02 RX ADMIN — B-COMPLEX W/ C & FOLIC ACID TAB 1 TABLET: TAB at 09:12

## 2022-09-02 RX ADMIN — Medication 400 MG: at 13:12

## 2022-09-02 RX ADMIN — POTASSIUM & SODIUM PHOSPHATES POWDER PACK 280-160-250 MG 1 PACKET: 280-160-250 PACK at 06:49

## 2022-09-02 RX ADMIN — MAGNESIUM SULFATE IN WATER 2 G: 40 INJECTION, SOLUTION INTRAVENOUS at 09:10

## 2022-09-02 RX ADMIN — Medication 400 MG: at 09:11

## 2022-09-02 RX ADMIN — Medication 1 TABLET: at 09:12

## 2022-09-02 RX ADMIN — LEVOTHYROXINE SODIUM 175 MCG: 0.03 TABLET ORAL at 06:51

## 2022-09-02 RX ADMIN — FUROSEMIDE 20 MG: 10 INJECTION, SOLUTION INTRAMUSCULAR; INTRAVENOUS at 09:11

## 2022-09-02 RX ADMIN — POTASSIUM & SODIUM PHOSPHATES POWDER PACK 280-160-250 MG 1 PACKET: 280-160-250 PACK at 03:15

## 2022-09-02 RX ADMIN — HEPARIN SODIUM 5000 UNITS: 5000 INJECTION, SOLUTION INTRAVENOUS; SUBCUTANEOUS at 20:30

## 2022-09-02 RX ADMIN — Medication 250 MG: at 09:12

## 2022-09-02 RX ADMIN — Medication 1000 MCG: at 09:12

## 2022-09-02 RX ADMIN — HEPARIN SODIUM 5000 UNITS: 5000 INJECTION, SOLUTION INTRAVENOUS; SUBCUTANEOUS at 05:28

## 2022-09-02 RX ADMIN — ACETAMINOPHEN 975 MG: 325 TABLET ORAL at 13:12

## 2022-09-02 RX ADMIN — PANTOPRAZOLE SODIUM 40 MG: 40 TABLET, DELAYED RELEASE ORAL at 09:11

## 2022-09-02 RX ADMIN — FUROSEMIDE 20 MG: 10 INJECTION, SOLUTION INTRAMUSCULAR; INTRAVENOUS at 16:41

## 2022-09-02 RX ADMIN — TRAMADOL HYDROCHLORIDE 50 MG: 50 TABLET, COATED ORAL at 15:18

## 2022-09-02 RX ADMIN — ACETAMINOPHEN 975 MG: 325 TABLET ORAL at 05:29

## 2022-09-02 RX ADMIN — SENNOSIDES AND DOCUSATE SODIUM 1 TABLET: 8.6; 5 TABLET ORAL at 09:11

## 2022-09-02 RX ADMIN — POTASSIUM CHLORIDE 40 MEQ: 1500 TABLET, EXTENDED RELEASE ORAL at 09:10

## 2022-09-02 RX ADMIN — OXYCODONE HYDROCHLORIDE 5 MG: 5 TABLET ORAL at 20:29

## 2022-09-02 RX ADMIN — Medication 25 MCG: at 09:20

## 2022-09-02 RX ADMIN — HEPARIN SODIUM 5000 UNITS: 5000 INJECTION, SOLUTION INTRAVENOUS; SUBCUTANEOUS at 13:12

## 2022-09-02 RX ADMIN — PSYLLIUM HUSK 1 PACKET: 3.4 POWDER ORAL at 09:22

## 2022-09-02 ASSESSMENT — ACTIVITIES OF DAILY LIVING (ADL)
ADLS_ACUITY_SCORE: 26

## 2022-09-02 NOTE — PROGRESS NOTES
CVTS Daily Progress Note   POD # 4 s/p left atrial mass removal, PVI/LAAE  Attending: Dr. Jeniffer Loredo MD  LOS: 10    SUBJECTIVE/INTERVAL EVENTS:  No acute events overnight. Normotensive. Patient progressing well otherwise. Maintaining oxygen saturations on room air. Up to chair this AM and ambulating with therapy. Pain well controlled. - BM / +flatus. Tolerating diet without nausea. UOP adequate. Chest tube removed 09/01/2022. Patient denies new chest pain, shortness of breath, abdominal pain, calf pain, nausea. Patient has no questions today.    OBJECTIVE:  Temp:  [98.3  F (36.8  C)-98.8  F (37.1  C)] 98.6  F (37  C)  Pulse:  [] 81  Resp:  [13-25] 18  BP: ()/(43-76) 120/71  SpO2:  [92 %-99 %] 97 %  Resp: 18    Vitals:    08/29/22 0647 08/30/22 0615 08/31/22 0600 09/01/22 0630   Weight: 58.7 kg (129 lb 4.8 oz) 70.9 kg (156 lb 6.4 oz) 67.6 kg (149 lb 1.6 oz) 65.5 kg (144 lb 8 oz)    09/02/22 0529   Weight: 65.1 kg (143 lb 9.6 oz)       Current Medications:    Scheduled Meds:    acetaminophen  975 mg Oral Q8H     cyanocobalamin  1,000 mcg Oral Daily     furosemide  20 mg Intravenous BID     heparin ANTICOAGULANT  5,000 Units Subcutaneous Q8H     levothyroxine  175 mcg Oral QAM AC     lidocaine  2 patch Transdermal Q24H     lidocaine   Transdermal Q8H     magnesium oxide  400 mg Oral Q4H     magnesium sulfate  2 g Intravenous Once     multivitamin w/minerals  1 tablet Oral Daily     pantoprazole  40 mg Oral or NG Tube Daily    Or     pantoprazole  40 mg Oral Daily     polyethylene glycol  17 g Oral Daily     potassium & sodium phosphates  1 packet Oral or Feeding Tube Q4H     psyllium  1 packet Oral Daily     senna-docusate  1 tablet Oral BID     sodium chloride (PF)  3 mL Intracatheter Q8H     vitamin B complex with vitamin C  1 tablet Oral Daily     vitamin C  250 mg Oral Daily     Vitamin D3  25 mcg Oral Daily     Continuous Infusions:  PRN Meds:.acetaminophen, bisacodyl, calcium gluconate, calcium  gluconate, calcium gluconate, glucose **OR** dextrose **OR** glucagon, hydrALAZINE, ketorolac, lactated ringers, lidocaine 4%, lidocaine (buffered or not buffered), magnesium hydroxide, melatonin, naloxone **OR** naloxone **OR** naloxone **OR** naloxone, ondansetron **OR** ondansetron, oxyCODONE **OR** oxyCODONE, prochlorperazine **OR** prochlorperazine, sodium chloride (PF), traMADol    Cardiographics:    Telemetry monitoring demonstrates A. fib with rates in the 80s-90s per my personal review.    Imaging:  All available imaging reviewed, no acute concerns    Labs, personally reviewed.  BMP  Recent Labs   Lab 09/02/22  0502 09/01/22  0753 09/01/22  0503 08/31/22  2110 08/31/22  1558 08/31/22  0746 08/31/22  0456 08/30/22  0700 08/30/22  0510 08/29/22  1759 08/29/22  1645     --  140  --   --   --  135*  --  147*  --  141   POTASSIUM 3.4*  --  3.5  3.5  --   --   --  4.1  --  4.0   < > 3.1*   CHLORIDE 104  --  107  --   --   --  106  --  116*  --  114*   YEVGENIY 8.6  --   --   --   --   --  7.9*  --  7.6*  --  8.0*   CO2 28  --  29  --   --   --  24  --  26  --  21*   BUN 7*  --   --   --   --   --  9  --  13  --  13   CR 0.55*  --   --   --   --   --  0.57*  --  0.71  --  0.73    130*  --  142* 121*   < > 114   < > 120  118*   < > 150*    < > = values in this interval not displayed.     CBC  Recent Labs   Lab 09/02/22  0502 08/30/22  0510 08/29/22  2303 08/29/22  1645 08/29/22  1533   WBC 8.4 10.5  --  13.6* 12.0*   RBC 2.40* 2.78*  --  4.11 3.12*   HGB 8.0* 9.2* 10.0* 13.5 10.4*   HCT 22.9* 26.4*  --  38.9 29.6*   MCV 95 95  --  95 95   MCH 33.3* 33.1*  --  32.8 33.3*   MCHC 34.9 34.8  --  34.7 35.1   RDW 13.2 13.0  --  13.2 13.2   * 128*  --  135* 156     INR  Recent Labs   Lab 08/29/22  1645 08/29/22  1533   INR 1.39* 1.44*      Hepatic Panel  Recent Labs   Lab 08/30/22  0510 08/29/22  1645   AST 56* 47*   ALT 26 27   ALKPHOS 27* 36*   BILITOTAL 0.6 0.8   ALBUMIN 3.8 3.6       Magnesium   Date  "Value Ref Range Status   09/02/2022 1.7 (L) 1.8 - 2.6 mg/dL Final   09/01/2022 1.6 (L) 1.8 - 2.6 mg/dL Final   08/31/2022 1.8 1.8 - 2.6 mg/dL Final        I/O:  I/O last 3 completed shifts:  In: 2250 [P.O.:1750; I.V.:500]  Out: 3101 [Urine:3101]       Blood pressure 120/71, pulse 81, temperature 98.6  F (37  C), temperature source Oral, resp. rate 18, height 1.549 m (5' 1\"), weight 65.1 kg (143 lb 9.6 oz), SpO2 97 %.  Vitals:    08/31/22 0600 09/01/22 0630 09/02/22 0529   Weight: 67.6 kg (149 lb 1.6 oz) 65.5 kg (144 lb 8 oz) 65.1 kg (143 lb 9.6 oz)        Physical Exam:  Weight 65.1 kg from 65.5 kg yesterday, preop weight 59.9 kg.   24 hr Fluid status; net loss -851 mL. UOP 3101 mL  MAPs: 82    Gen: A&Ox4, out of bed to chair, with NAD  Neuro: no focal deficits   CV: Controlled rate A. fib, no murmurs, rubs or gallops.  No JVD  Pulm: CTA, no wheezing or rhonchi, normal breathing on RA  Abd: nondistended, normal BS, soft, nontender  Ext: Mild peripheral edema  Incision: clean, dry, intact, no erythema, sternum stable  Tubes/drain sites: dressing clean and dry.     ASSESSMENT/PLAN:  Torrie Meyer is a 57 year old female with a history of atrial fibrillation, cardiomyopathy who is POD #4 S/P left myxoma removal, PVI and left atrial appendage excision.  Awake and alert, out of bed to chair, with no apparent distress  Rate controlled A. fib, may start Eliquis given previous history of ablation  Postop respiratory sufficiency resolved, now on room air, saturation 96%  Adequate inspiratory effort, I-S 1250  Incisions are clean dry intact, lungs are diminished to bases  Abdomen soft and nontender  Urine output and BM adequate  Weight 65.1 kg from 65.5 kg yesterday, preop weight 59.9 kg  Continue Lasix 40 mg IV twice a day  Anticipate discharge to home in the a.m.        NEURO:   - Scheduled Tylenol and PRN Tylenol/oxycodone/lidocaine patches/Toradol for pain    CV:   - Normotensive/HTN on epi; turn off  - No indication for " ASA or statin  - Chest removed 09/01/2022  - Cardiology following for possible cardioversion    PULM:   - Maintaining oxygen saturations on room air  - Encourage pulmonary toilet    FEN/GI:  - Continue electrolyte replacement protocol  - Diet: Regular  - Bowel regimen  - PTA MVI, vitamins B12, B, C, D3    RENAL:  - Adequate UOP/hr. Continue to monitor closely.  - Discontinue Arreaga  - Diuresis with Lasix 40mg IV BID     HEME:  - Acute blood loss anemia post-op.   - No bleeding concerns. Hep SQ, ASA    ID:  - Suzanne op ppx complete, afebrile. No concerns for infection    ENDO:   - No issues with blood sugars; discontinue SSI  - PTA Synthroid  - PTA Fosamax     PPx:   - DVT: SCDs, SQ heparin TID, ambulation   - GI: Protonix 40mg PO daily    DISPO:   -Transfer to telemetry floor when bed is available     Patient discussed with Dr. Jeniffer Loredo MD.     Cem Correa PA-C  Cardiothoracic Surgery  Pager [phone]    9:54 AM   September 2, 2022

## 2022-09-02 NOTE — PROGRESS NOTES
"        Impression and Plan     Impression:   1. Left atrial myxoma s/p surgical resection  2. Sinus arrest - currently in atrial fibrillation with HR around 90 bpm.  3. Cardiogenic shock - postoperative from cardiac surgery - resolved.  4. Paroxysmal atrial fibrillation s/p bilateral PVI and HEATHER ligation on 8/29/22 (surgical)  5. Acute respiratory failure with hypoxia - improved.    Plan:    Okay to remove temporary pacemaker.    Would not attempt cardioversion at this time.    Recommend OAC with eliquis for 4 weeks    Will sign off. Please contact me with additional questions or concerns    Discussed with Dr. Loredo    Primary Cardiologist: Dr. Anahi Sawant MD    Subjective     No new cardiac complaints. Feeling better by the day.    Cardiac Diagnostics   Telemetry (personally reviewed): atrial fibrillation, rate 85-90 bpm    Echocardiogram (results reviewed):   TTE 8/24/22  1. The left ventricle is normal in size. Left ventricular systolic performance is mild to moderately reduced. The ejection fraction is estimated to be 40-45%.  2. There is mild to moderate global reduction in left ventricular systolic performance.  3. There is a 3 x 4 x 4.5 cm mass in the left atrium attached to the atrial septum. The echocardiographic features and anatomic location are paradigmaticof an atrial myxoma. The mass moves forward in diastole abutting the mitral valve leaflets and extends through the mitral annular valve plane.  4. Mild right ventricular enlargement with low normal right ventricular systolic performance.  5. There is moderate left atrial enlargement. There is mild to moderate right atrial enlargement.  6. No thrombus is detected within the left atrial appendage.  7. Color Doppler interrogation the atrial septum suggest the presence of a small patent foramen ovale (PFO). No right to left shunting, however, is detected with echo contrast (\"bubble\") study.     Cardiac Cath (results reviewed): 8/25/22  57-year-old female " "with recent diagnosis of atrial myxoma, here for preoperative coronary angiography.   Coronary angiography showed no coronary artery disease with minimal atherosclerosis and a right dominant coronary system.   Calcified mass was noted on fluoroscopy, which likely represented the atrial myxoma    Physical Examination       /72   Pulse 81   Temp 98.6  F (37  C) (Oral)   Resp 18   Ht 1.549 m (5' 1\")   Wt 65.1 kg (143 lb 9.6 oz)   LMP  (Approximate)   SpO2 98%   BMI 27.13 kg/m            Intake/Output Summary (Last 24 hours) at 9/1/2022 1046  Last data filed at 9/1/2022 1020  Gross per 24 hour   Intake 334.26 ml   Output 5750 ml   Net -5415.74 ml       General: pleasant female. No acute distress.   HENT: external ears normal. Nares patent. Mucous membranes moist.  Eyes: perrla, extraocular muscles intact. No scleral icterus.   Neck: No JVD  Lungs: clear to auscultation  COR: normal rate, irregularly irregular rhythm, No murmurs, rubs, or gallops  Abd: nondistended, BS present  Extrem: No edema         Imaging      No new imaging.    Lab Results   Lab Results   Component Value Date    CHOL 187 08/23/2022    HDL 62 08/23/2022    TRIG 66 08/23/2022    BUN 7 (L) 09/02/2022     09/02/2022    CO2 28 09/02/2022       Lab Results   Component Value Date    WBC 8.4 09/02/2022    HGB 8.0 (L) 09/02/2022    HCT 22.9 (L) 09/02/2022    MCV 95 09/02/2022     (L) 09/02/2022       Lab Results   Component Value Date    TSH 3.57 08/31/2022    INR 1.39 (H) 08/29/2022               Current Inpatient Scheduled Medications   Scheduled Meds:    acetaminophen  975 mg Oral Q8H     cyanocobalamin  1,000 mcg Oral Daily     furosemide  20 mg Intravenous BID     heparin ANTICOAGULANT  5,000 Units Subcutaneous Q8H     levothyroxine  175 mcg Oral QAM AC     lidocaine  2 patch Transdermal Q24H     lidocaine   Transdermal Q8H     magnesium oxide  400 mg Oral Q4H     multivitamin w/minerals  1 tablet Oral Daily     pantoprazole  " 40 mg Oral or NG Tube Daily    Or     pantoprazole  40 mg Oral Daily     polyethylene glycol  17 g Oral Daily     potassium & sodium phosphates  1 packet Oral or Feeding Tube Q4H     psyllium  1 packet Oral Daily     senna-docusate  1 tablet Oral BID     sodium chloride (PF)  3 mL Intracatheter Q8H     vitamin B complex with vitamin C  1 tablet Oral Daily     vitamin C  250 mg Oral Daily     Vitamin D3  25 mcg Oral Daily     Continuous Infusions:         Medications Prior to Admission   Prior to Admission medications    Medication Sig Start Date End Date Taking? Authorizing Provider   alendronate (FOSAMAX) 70 MG tablet TAKE 1 TABLET BY MOUTH 1 TIME A WEEK 12/11/21  Yes Reported, Patient   aspirin (ASA) 81 MG EC tablet Take 1 tablet (81 mg) by mouth daily 8/18/22  Yes Anahi Sawant MD   cyanocobalamin (VITAMIN B-12) 1000 MCG tablet Take 1,000 mcg by mouth daily   Yes Unknown, Entered By History   fish oil-omega-3 fatty acids 1000 MG capsule Take 1 g by mouth daily   Yes Unknown, Entered By History   levothyroxine (SYNTHROID/LEVOTHROID) 175 MCG tablet Take 175 mcg by mouth daily 2/2/22  Yes Reported, Patient   multivitamin w/minerals (THERA-VIT-M) tablet Take 1 tablet by mouth daily   Yes Unknown, Entered By History   vitamin B-Complex Take 1 tablet by mouth daily   Yes Unknown, Entered By History   vitamin C (ASCORBIC ACID) 250 MG tablet Take 250 mg by mouth daily   Yes Unknown, Entered By History   Vitamin D3 (CHOLECALCIFEROL) 25 mcg (1000 units) tablet Take 1 tablet by mouth daily   Yes Unknown, Entered By History

## 2022-09-02 NOTE — PROGRESS NOTES
Care Management Follow Up    Length of Stay (days): 10    Expected Discharge Date: 09/04/2022       Concerns to be Addressed:   Postoperative care and recovery (see OP note of 8/29): Lasix IV twice a day.  Patient plan of care discussed at interdisciplinary rounds: Yes    Anticipated Discharge Disposition:  Home     Anticipated Discharge Services:  Therapy agrees with a home discharge, family support.  Anticipated Discharge DME:  Per therapy (if indicated).    Patient/family educated on Medicare website which has current facility and service quality ratings:  NA  Education Provided on the Discharge Plan:  Per team  Patient/Family in Agreement with the Plan:  yes    Referrals Placed by CM/SW:  none  Private pay costs discussed: Not applicable     Additional Information:  Patient lives in a house with her fiance Esau and her 17 year old son.  She is independent with all activities of daily living at baseline and drives and works part time. Her goal is to return home at discharge and therapy agrees with the plan. Care management will continue to watch team recommendations. Anticipate family to transport at discharge.    Noreen Veliz RN

## 2022-09-02 NOTE — PLAN OF CARE
Problem: Plan of Care - These are the overarching goals to be used throughout the patient stay.    Goal: Plan of Care Review/Shift Note  Description: The Plan of Care Review/Shift note should be completed every shift.  The Outcome Evaluation is a brief statement about your assessment that the patient is improving, declining, or no change.  This information will be displayed automatically on your shift note.  Outcome: Ongoing, Progressing   Goal Outcome Evaluation:             [facility] - ICU    RN Progress Note:            Pertinent Assessments:      Please refer to flowsheet rows for full assessment     Had issue with constipation overnight  which resolved with enema.         Mobility Level:     3         Key Events - This Shift:     Was very constipated,               Plan:     Needs to be in bowel regimens.         Point of Contact Update YES-OR-NO: Yes  If No, reason: na  Name:emi  Phone Number:na  Summary of Conversation: emi

## 2022-09-03 ENCOUNTER — APPOINTMENT (OUTPATIENT)
Dept: OCCUPATIONAL THERAPY | Facility: HOSPITAL | Age: 57
End: 2022-09-03
Payer: COMMERCIAL

## 2022-09-03 LAB
CALCIUM, IONIZED MEASURED: 1.19 MMOL/L (ref 1.11–1.3)
HOLD SPECIMEN: NORMAL
ION CA PH 7.4: 1.22 MMOL/L (ref 1.11–1.3)
MAGNESIUM SERPL-MCNC: 1.8 MG/DL (ref 1.8–2.6)
PH: 7.45 (ref 7.35–7.45)
PHOSPHATE SERPL-MCNC: 2.6 MG/DL (ref 2.5–4.5)
POTASSIUM BLD-SCNC: 3.8 MMOL/L (ref 3.5–5)

## 2022-09-03 PROCEDURE — 36415 COLL VENOUS BLD VENIPUNCTURE: CPT | Performed by: PHYSICIAN ASSISTANT

## 2022-09-03 PROCEDURE — 210N000001 HC R&B IMCU HEART CARE

## 2022-09-03 PROCEDURE — 97535 SELF CARE MNGMENT TRAINING: CPT | Mod: GO

## 2022-09-03 PROCEDURE — 82330 ASSAY OF CALCIUM: CPT | Performed by: PHYSICIAN ASSISTANT

## 2022-09-03 PROCEDURE — 84132 ASSAY OF SERUM POTASSIUM: CPT | Performed by: THORACIC SURGERY (CARDIOTHORACIC VASCULAR SURGERY)

## 2022-09-03 PROCEDURE — P9045 ALBUMIN (HUMAN), 5%, 250 ML: HCPCS | Performed by: PHYSICIAN ASSISTANT

## 2022-09-03 PROCEDURE — 250N000013 HC RX MED GY IP 250 OP 250 PS 637: Performed by: THORACIC SURGERY (CARDIOTHORACIC VASCULAR SURGERY)

## 2022-09-03 PROCEDURE — 99207 PR NO CHARGE LOS: CPT | Performed by: PHYSICIAN ASSISTANT

## 2022-09-03 PROCEDURE — 250N000013 HC RX MED GY IP 250 OP 250 PS 637: Performed by: PHYSICIAN ASSISTANT

## 2022-09-03 PROCEDURE — 97110 THERAPEUTIC EXERCISES: CPT | Mod: GO

## 2022-09-03 PROCEDURE — 83735 ASSAY OF MAGNESIUM: CPT | Performed by: PHYSICIAN ASSISTANT

## 2022-09-03 PROCEDURE — 250N000011 HC RX IP 250 OP 636: Performed by: PHYSICIAN ASSISTANT

## 2022-09-03 PROCEDURE — 84100 ASSAY OF PHOSPHORUS: CPT | Performed by: PHYSICIAN ASSISTANT

## 2022-09-03 RX ORDER — POTASSIUM CHLORIDE 1500 MG/1
20 TABLET, EXTENDED RELEASE ORAL ONCE
Status: COMPLETED | OUTPATIENT
Start: 2022-09-03 | End: 2022-09-03

## 2022-09-03 RX ORDER — MAGNESIUM OXIDE 400 MG/1
400 TABLET ORAL EVERY 4 HOURS
Status: COMPLETED | OUTPATIENT
Start: 2022-09-03 | End: 2022-09-03

## 2022-09-03 RX ADMIN — OXYCODONE HYDROCHLORIDE 5 MG: 5 TABLET ORAL at 00:40

## 2022-09-03 RX ADMIN — PANTOPRAZOLE SODIUM 40 MG: 40 TABLET, DELAYED RELEASE ORAL at 08:27

## 2022-09-03 RX ADMIN — Medication 25 MCG: at 08:27

## 2022-09-03 RX ADMIN — POTASSIUM & SODIUM PHOSPHATES POWDER PACK 280-160-250 MG 1 PACKET: 280-160-250 PACK at 08:33

## 2022-09-03 RX ADMIN — ACETAMINOPHEN 975 MG: 325 TABLET ORAL at 14:11

## 2022-09-03 RX ADMIN — HEPARIN SODIUM 5000 UNITS: 5000 INJECTION, SOLUTION INTRAVENOUS; SUBCUTANEOUS at 12:46

## 2022-09-03 RX ADMIN — LIDOCAINE PATCH 4% 2 PATCH: 40 PATCH TOPICAL at 08:27

## 2022-09-03 RX ADMIN — Medication 1000 MCG: at 08:27

## 2022-09-03 RX ADMIN — Medication 400 MG: at 08:27

## 2022-09-03 RX ADMIN — POTASSIUM CHLORIDE 20 MEQ: 1500 TABLET, EXTENDED RELEASE ORAL at 00:06

## 2022-09-03 RX ADMIN — POTASSIUM CHLORIDE 20 MEQ: 1500 TABLET, EXTENDED RELEASE ORAL at 08:27

## 2022-09-03 RX ADMIN — ACETAMINOPHEN 975 MG: 325 TABLET ORAL at 21:37

## 2022-09-03 RX ADMIN — FUROSEMIDE 20 MG: 10 INJECTION, SOLUTION INTRAMUSCULAR; INTRAVENOUS at 08:27

## 2022-09-03 RX ADMIN — TRAMADOL HYDROCHLORIDE 50 MG: 50 TABLET, COATED ORAL at 21:38

## 2022-09-03 RX ADMIN — FUROSEMIDE 20 MG: 10 INJECTION, SOLUTION INTRAMUSCULAR; INTRAVENOUS at 14:11

## 2022-09-03 RX ADMIN — LEVOTHYROXINE SODIUM 175 MCG: 0.03 TABLET ORAL at 06:49

## 2022-09-03 RX ADMIN — HEPARIN SODIUM 5000 UNITS: 5000 INJECTION, SOLUTION INTRAVENOUS; SUBCUTANEOUS at 04:57

## 2022-09-03 RX ADMIN — SENNOSIDES AND DOCUSATE SODIUM 1 TABLET: 8.6; 5 TABLET ORAL at 21:37

## 2022-09-03 RX ADMIN — Medication 250 MG: at 08:28

## 2022-09-03 RX ADMIN — TRAMADOL HYDROCHLORIDE 50 MG: 50 TABLET, COATED ORAL at 11:41

## 2022-09-03 RX ADMIN — APIXABAN 5 MG: 5 TABLET, FILM COATED ORAL at 21:38

## 2022-09-03 RX ADMIN — ACETAMINOPHEN 975 MG: 325 TABLET ORAL at 06:50

## 2022-09-03 RX ADMIN — ALBUMIN (HUMAN) 250 ML: 12.5 INJECTION, SOLUTION INTRAVENOUS at 12:47

## 2022-09-03 RX ADMIN — POTASSIUM & SODIUM PHOSPHATES POWDER PACK 280-160-250 MG 1 PACKET: 280-160-250 PACK at 12:46

## 2022-09-03 RX ADMIN — Medication 1 TABLET: at 08:27

## 2022-09-03 RX ADMIN — Medication 400 MG: at 12:46

## 2022-09-03 RX ADMIN — B-COMPLEX W/ C & FOLIC ACID TAB 1 TABLET: TAB at 08:30

## 2022-09-03 ASSESSMENT — ACTIVITIES OF DAILY LIVING (ADL)
ADLS_ACUITY_SCORE: 25
ADLS_ACUITY_SCORE: 26
ADLS_ACUITY_SCORE: 25
ADLS_ACUITY_SCORE: 26
ADLS_ACUITY_SCORE: 25
ADLS_ACUITY_SCORE: 26
ADLS_ACUITY_SCORE: 25
ADLS_ACUITY_SCORE: 25
ADLS_ACUITY_SCORE: 26
ADLS_ACUITY_SCORE: 26

## 2022-09-03 NOTE — PROGRESS NOTES
CVTS Daily Progress Note   POD # 5 S/P left atrial mass removal, PVI/LAAE  Attending: Dr. Jeniffer Loredo MD  LOS: 11    SUBJECTIVE/INTERVAL EVENTS:  No acute events overnight. Normotensive. Patient progressing well otherwise. Maintaining oxygen saturations on room air. Up to chair this AM and ambulating with therapy. Pain well controlled. - BM / +flatus. Tolerating diet without nausea. UOP adequate. Chest tube removed 09/01/2022. Patient denies new chest pain, shortness of breath, abdominal pain, calf pain, nausea. Patient has no questions today.    OBJECTIVE:  Temp:  [98.3  F (36.8  C)-99  F (37.2  C)] 98.4  F (36.9  C)  Pulse:  [] 88  Resp:  [17-19] 18  BP: ()/(51-88) 104/66  SpO2:  [95 %-97 %] 95 %  Resp: 18    Vitals:    08/30/22 0615 08/31/22 0600 09/01/22 0630 09/02/22 0529   Weight: 70.9 kg (156 lb 6.4 oz) 67.6 kg (149 lb 1.6 oz) 65.5 kg (144 lb 8 oz) 65.1 kg (143 lb 9.6 oz)    09/03/22 0345   Weight: 63.3 kg (139 lb 9.6 oz)       Current Medications:    Scheduled Meds:    acetaminophen  975 mg Oral Q8H     cyanocobalamin  1,000 mcg Oral Daily     furosemide  20 mg Intravenous BID     heparin ANTICOAGULANT  5,000 Units Subcutaneous Q8H     levothyroxine  175 mcg Oral QAM AC     lidocaine  2 patch Transdermal Q24H     lidocaine   Transdermal Q8H     multivitamin w/minerals  1 tablet Oral Daily     pantoprazole  40 mg Oral Daily     polyethylene glycol  17 g Oral Daily     psyllium  1 packet Oral Daily     senna-docusate  1 tablet Oral BID     sodium chloride (PF)  3 mL Intracatheter Q8H     vitamin B complex with vitamin C  1 tablet Oral Daily     vitamin C  250 mg Oral Daily     Vitamin D3  25 mcg Oral Daily     Continuous Infusions:  PRN Meds:.acetaminophen, bisacodyl, calcium gluconate, calcium gluconate, calcium gluconate, glucose **OR** dextrose **OR** glucagon, hydrALAZINE, ketorolac, lactated ringers, lidocaine 4%, lidocaine (buffered or not buffered), magnesium hydroxide, melatonin, naloxone  **OR** naloxone **OR** naloxone **OR** naloxone, ondansetron **OR** ondansetron, oxyCODONE **OR** oxyCODONE, prochlorperazine **OR** prochlorperazine, sodium chloride (PF), traMADol    Cardiographics:    Telemetry monitoring demonstrates rate controlled A. fib with rates in the  per my personal review.    Imaging:  All available imaging reviewed, no acute concerns    Labs, personally reviewed.  BMP  Recent Labs   Lab 09/03/22  0454 09/02/22  1416 09/02/22  0502 09/01/22  0753 09/01/22  0503 08/31/22  2110 08/31/22  1558 08/31/22  0746 08/31/22  0456 08/30/22  0700 08/30/22  0510 08/29/22  1759 08/29/22  1645   NA  --   --  139  --  140  --   --   --  135*  --  147*  --  141   POTASSIUM 3.8 3.5 3.4*  --  3.5  3.5  --   --   --  4.1  --  4.0   < > 3.1*   CHLORIDE  --   --  104  --  107  --   --   --  106  --  116*  --  114*   YEVGENIY  --   --  8.6  --   --   --   --   --  7.9*  --  7.6*  --  8.0*   CO2  --   --  28  --  29  --   --   --  24  --  26  --  21*   BUN  --   --  7*  --   --   --   --   --  9  --  13  --  13   CR  --   --  0.55*  --   --   --   --   --  0.57*  --  0.71  --  0.73   GLC  --   --  103 130*  --  142* 121*   < > 114   < > 120  118*   < > 150*    < > = values in this interval not displayed.     CBC  Recent Labs   Lab 09/02/22  0502 08/30/22  0510 08/29/22  2303 08/29/22  1645 08/29/22  1533   WBC 8.4 10.5  --  13.6* 12.0*   RBC 2.40* 2.78*  --  4.11 3.12*   HGB 8.0* 9.2* 10.0* 13.5 10.4*   HCT 22.9* 26.4*  --  38.9 29.6*   MCV 95 95  --  95 95   MCH 33.3* 33.1*  --  32.8 33.3*   MCHC 34.9 34.8  --  34.7 35.1   RDW 13.2 13.0  --  13.2 13.2   * 128*  --  135* 156     INR  Recent Labs   Lab 08/29/22  1645 08/29/22  1533   INR 1.39* 1.44*      Hepatic Panel  Recent Labs   Lab 08/30/22  0510 08/29/22  1645   AST 56* 47*   ALT 26 27   ALKPHOS 27* 36*   BILITOTAL 0.6 0.8   ALBUMIN 3.8 3.6       Magnesium   Date Value Ref Range Status   09/03/2022 1.8 1.8 - 2.6 mg/dL Final   09/02/2022 1.7 (L)  "1.8 - 2.6 mg/dL Final   09/01/2022 1.6 (L) 1.8 - 2.6 mg/dL Final        I/O:  I/O last 3 completed shifts:  In: 601 [P.O.:598; I.V.:3]  Out: 1700 [Urine:1700]       Blood pressure 104/66, pulse 88, temperature 98.4  F (36.9  C), temperature source Oral, resp. rate 18, height 1.549 m (5' 1\"), weight 63.3 kg (139 lb 9.6 oz), SpO2 95 %.  Vitals:    09/01/22 0630 09/02/22 0529 09/03/22 0345   Weight: 65.5 kg (144 lb 8 oz) 65.1 kg (143 lb 9.6 oz) 63.3 kg (139 lb 9.6 oz)        Physical Exam:  Weight 63.3 kg from 65.1 kg, preop weight 59.9 kg.   24 hr Fluid status; net loss -1099 mL. UOP 1700 mL  MAPs:     Gen: A&Ox4, out of bed to chair, with NAD  Neuro: no focal deficits   CV: RRR, normal S1 S2, no murmurs, rubs or gallops.  No JVD  Pulm: Lungs sound diminished to bases no wheezing or rhonchi, normal breathing on RA  Abd: nondistended, normal BS, soft, nontender  Ext: No peripheral edema appreciated  Incision: clean, dry, intact, no erythema, sternum stable  Tubes/drain sites: dressing clean and dry.     ASSESSMENT/PLAN:  Torrie Meyer is a 57 year old female with a history of atrial fibrillation, cardiomyopathy who is POD #5 S/P left myxoma removal, PVI and left atrial appendage excision.  Awake and alert, out of bed to chair, with no apparent distress  Rate controlled A. fib, may start Eliquis given previous history of ablation  Postop respiratory sufficiency resolved, now on room air, saturation 96%  Adequate inspiratory effort, I-S 1250  Incisions are clean dry intact, lungs are diminished to bases  Abdomen soft and nontender  Urine output and BM adequate  Weight 63.3 kg from 65.1 kg yesterday, preop weight 59.9 kg  Continue Lasix 40 mg IV twice a day  Anticipate discharge to home in the a.m.  No driving for 4 weeks, no lifting, no pushing, no pulling more than 10 pounds for 6 to 8 weeks  No bathtub bathing and no swimming for bathing for 8 to 10 weeks  Shower daily and dab incision dry, and leave open to air, and " monitor for signs or symptoms of infection which include redness, swelling, drainage, and or warmth at incision site, if any of these is present, to call CV surgery at [phone] and or go to the nearest emergency room.  Follow-up with your PCP Merry Martell MD within 7 to 10 days of hospital discharge  Follow-up with your cardiac surgeon Dr. Jeniffer Loredo MD's GAETANO on 10/05/2022 at 10 AM at the [facility].  Follow-up with your primary cardiologist Dr. Anahi Sawant MD on 10/13/2022 at 1:20 PM at the [facility].            NEURO:   - Scheduled Tylenol and PRN Tylenol/oxycodone/lidocaine patches/Toradol for pain    CV:   - Normotensive/hypotensive  - No indication for ASA or statin  - Chest removed 09/01/2022  - Rate controlled A. fib, will start Eliquis 5 mg twice a day    PULM:   - Maintaining oxygen saturations on room air  - Encourage pulmonary toilet    FEN/GI:  - Continue electrolyte replacement protocol  - Diet: Regular  - Bowel regimen  - PTA MVI, vitamins B12, B, C, D3    RENAL:  - Adequate UOP/hr. Continue to monitor closely.  - Discontinue Arreaga  - Diuresis with Lasix 40mg IV BID     HEME:  - Acute blood loss anemia post-op.   - No bleeding concerns. Hep SQ, ASA    ID:  - Suzanne op ppx complete, afebrile. No concerns for infection    ENDO:   - No issues with blood sugars; discontinue SSI  - PTA Synthroid  - PTA Fosamax     PPx:   - DVT: SCDs, SQ heparin TID, ambulation   - GI: Protonix 40mg PO daily    DISPO:   -Transfer to telemetry floor when bed is available     Patient discussed with Dr. Jeniffer Loredo MD.     Cem Correa PA-C  Cardiothoracic Surgery  Pager [phone]    12:52 PM   September 3, 2022

## 2022-09-03 NOTE — DISCHARGE SUMMARY
Cardiovascular Surgery Discharge Summary    Primary Care Physician:  Merry Cash  Discharge Provider: Cem Correa PA-C  Admission Date: 8/23/2022  Admission Diagnoses: Atrial myxoma [D15.1]  Short of breath on exertion [R06.02]  Left atrial mass [I51.89]  Discharge Date: 9/04/2022  Disposition: Home  Condition at Discharge: Good  Code Status: Full Code     Principal Diagnosis:   Atrial myxoma S/P left atrial myxoma excision    Discharge Diagnoses:    Active Problems:      Patient Active Problem List   Diagnosis     Atrial fibrillation with slow ventricular response (H)     Acquired hypothyroidism     Atrial myxoma     Sinus bradycardia     Hypomagnesemia     Short of breath on exertion     Left atrial mass         Consult/s: Dietary, critical care medicine, cardiology    Surgery: 08/29/2022  Left atrial myxoma excision with Dr. Jeniffer Loredo    PROCEDURES:  1.  Median sternotomy.  2.  Epiaortic ultrasound of the ascending aorta.  3.  Placement on central cardiopulmonary bypass using bicaval cannulation.  4.  Extended transseptal approach and removal of a left atrial myxoma.  5.  Reconstruction of the atrial septum with autologous pericardium.  6.  Bilateral pulmonary vein isolation.  7.  Excision of left atrial appendage      Discharge Medications:      Review of your medicines      START taking      Dose / Directions   acetaminophen 325 MG tablet  Commonly known as: TYLENOL  Used for: Status post cardiac surgery      Dose: 650 mg  Take 2 tablets (650 mg) by mouth every 4 hours as needed for other, headaches or fever (For optimal non-opioid multimodal pain management to improve pain control.)  Quantity: 30 tablet  Refills: 0     apixaban ANTICOAGULANT 5 MG tablet  Commonly known as: ELIQUIS  Indication: Atrial Fibrillation Not Caused By A Heart Valve Problem  Used for: Atrial fibrillation with slow ventricular response (H)      Dose: 5 mg  Take 1 tablet (5 mg) by mouth 2 times daily  Quantity: 60  tablet  Refills: 3     furosemide 20 MG tablet  Commonly known as: LASIX  Used for: Status post cardiac surgery      Dose: 20 mg  Take 1 tablet (20 mg) by mouth daily for 7 days  Quantity: 7 tablet  Refills: 0     Lidocaine 4 % Patch  Commonly known as: LIDOCARE  Used for: Status post cardiac surgery      Dose: 2 patch  Place 2 patches onto the skin every 24 hours To prevent lidocaine toxicity, patient should be patch free for 12 hrs daily. Apply 1 patch to each side of the sternal incision for 12 hours and remove for 12 hours.  Quantity: 20 patch  Refills: 0     oxyCODONE 5 MG tablet  Commonly known as: ROXICODONE  Used for: Status post cardiac surgery      Dose: 5 mg  Take 1 tablet (5 mg) by mouth every 4 hours as needed for moderate to severe pain  Quantity: 16 tablet  Refills: 0     pantoprazole 40 MG EC tablet  Commonly known as: PROTONIX  Used for: Status post cardiac surgery      Dose: 40 mg  Take 1 tablet (40 mg) by mouth daily for 30 days  Quantity: 30 tablet  Refills: 0     polyethylene glycol 17 g packet  Commonly known as: MIRALAX  Used for: Status post cardiac surgery      Dose: 17 g  Start taking on: September 5, 2022  Take 17 g by mouth daily  Quantity: 30 packet  Refills: 0     potassium chloride ER 10 MEQ CR tablet  Commonly known as: KLOR-CON M  Used for: Status post cardiac surgery      Dose: 10 mEq  Take 1 tablet (10 mEq) by mouth daily for 7 days  Quantity: 7 tablet  Refills: 0     traMADol 50 MG tablet  Commonly known as: ULTRAM  Used for: Status post cardiac surgery      Dose: 50 mg  Take 1 tablet (50 mg) by mouth every 8 hours as needed for moderate to severe pain  Quantity: 20 tablet  Refills: 0        CONTINUE these medicines which have NOT CHANGED      Dose / Directions   alendronate 70 MG tablet  Commonly known as: FOSAMAX      TAKE 1 TABLET BY MOUTH 1 TIME A WEEK  Refills: 0     aspirin 81 MG EC tablet  Commonly known as: ASA  Used for: Atrial fibrillation with slow ventricular response  (H)      Dose: 81 mg  Take 1 tablet (81 mg) by mouth daily  Quantity: 90 tablet  Refills: 4     cyanocobalamin 1000 MCG tablet  Commonly known as: VITAMIN B-12      Dose: 1,000 mcg  Take 1,000 mcg by mouth daily  Refills: 0     fish oil-omega-3 fatty acids 1000 MG capsule      Dose: 1 g  Take 1 g by mouth daily  Refills: 0     levothyroxine 175 MCG tablet  Commonly known as: SYNTHROID/LEVOTHROID      Dose: 175 mcg  Take 175 mcg by mouth daily  Refills: 0     multivitamin w/minerals tablet      Dose: 1 tablet  Take 1 tablet by mouth daily  Refills: 0     vitamin B-Complex      Dose: 1 tablet  Take 1 tablet by mouth daily  Refills: 0     vitamin C 250 MG tablet  Commonly known as: ASCORBIC ACID      Dose: 250 mg  Take 250 mg by mouth daily  Refills: 0     Vitamin D3 25 mcg (1000 units) tablet  Commonly known as: CHOLECALCIFEROL      Dose: 1 tablet  Take 1 tablet by mouth daily  Refills: 0           Where to get your medicines      [id number] - [address] AT [facility] & [address] [address]    Phone: [phone]     acetaminophen 325 MG tablet    apixaban ANTICOAGULANT 5 MG tablet    furosemide 20 MG tablet    Lidocaine 4 % Patch    oxyCODONE 5 MG tablet    pantoprazole 40 MG EC tablet    polyethylene glycol 17 g packet    potassium chloride ER 10 MEQ CR tablet    traMADol 50 MG tablet         Discharge Instructions:    Follow up appointment with Primary Care Physician: Merry Cash within 7-10 days of discharge from the hospital.  Follow up appointment with Specialist:   Follow-up with your cardiac surgeon Dr. Jeniffer Loredo MD's GAETANO on 10/05/2022 at 10 AM at the [facility].  Follow-up with your primary cardiologist Dr. Anahi Sawant MD on 10/13/2022 at 1:20 PM at the [facility].    Diet: Cardiac    Activity/Restrictions: As  "tolerated with sternal precautions in mind (see below). No driving for 4 weeks or while on pain medication.     - Shower and wash your incisions daily with soap and water. No tub baths/hot tubs for 4 weeks. An antibacterial soap such as Dial or Safeguard is recommended.    - Check your incisions every day. If you notice any redness, drainage, or anything unusual, please call the surgeons office.    - No driving for 4 weeks after surgery or while on pain medication     - Do not lift anything more than 10 pounds for 6 weeks after surgery. After 6 weeks, advance lifting is tolerated.    - You may have watery drainage from your chest tube site for 2-3 weeks after surgery. Your may cover with a Band-Aid to protect your clothing. Remove the Band-Aid every day and wash the site.    - If you have a leg lesion, you may have swelling for 2-3 months. Elevate your leg any time you are not walking.    - If you feel any \"popping\" or \"clicking\" sensations in your chest, your arms are out too far or you are putting too much weight into arm movements. Do not reach over your head or out to the side to pull something. Do not do any arm exercises or use any exercise equipment that involves arm movement. If you feel your sternum moving, call the surgeon's office.    - Increase your daily activity as explained by Cardiac Rehab. You are encouraged to enroll in an Outpatient Cardiac Rehab Program.    - No active sports using your upper arms for 3 months. This includes fishing, hunting, bowling, swimming, tennis or golf.    - No physical activity such as cutting the grass, raking, vacuuming, changing sheets on your bed, snow shoveling, or using a  for 3 months.    - Use incentive spirometer 6-8 times per day for 2 weeks.       Hospital Summary:   Torrie Meyer is a 57 year old female with history of hypothyroidism.  Patient was scheduled to undergo a colonoscopy, and they preprocedure evaluation patient to be in A. fib.  " "Echocardiogram was ordered that showed a 4 x 4 x 4 cm mobile mass on the left atrium.    Patient was admitted to [facility] on 08/23/2022 through the ED for inpatient optimization and further evaluation.    Following this finding, patient was referred to CV surgery for evaluation for left atrial mass excision.    Following CV surgery evaluation, patient was deemed a candidate for left atrial myxoma excision.  Following inpatient optimization, patient was taking today operating room on 08/29/2022 where patient underwent left atrial mass excision, bilateral pulmonary vein isolation and left atrial appendage ligation. Surgery was uneventful and patient was brought to the ICU post-operatively.  She was extubated on POD#0 and weaned from pressors. Patient was awake and alert, afebrile, and with stable vitals. Insulin drip was discontinued and she was transitioned to a sliding scale.  She was transferred to general telemetry status on POD#4 where patient has had return of bowel function, is maintaining oxygen saturations on room air.she had her chest tubes removed 09/01/2022 but patient wires were left in because patient was pacer wire dependent.  On postop day #3, patient went into A. fib with mostly rate control.  Cardiology evaluated and did not see any need for cardioversion as patient was recovering from significant redeem dysfunction postop.  Pacer wires were pulled on 09/03/2022.  She has no complaints of chest pain or shortness of breath on 09/04/22, patient is stable enough to be discharged to home.    Of note, patient is discharged on Eliquis 5 mg twice a day for 2 months.    Vital signs:  Temp: 98  F (36.7  C) Temp src: Oral BP: 118/84 Pulse: 103   Resp: 18 SpO2: 99 % O2 Device: None (Room air) Oxygen Delivery: 2 LPM Height: 154.9 cm (5' 1\") Weight: 62.3 kg (137 lb 4.8 oz)  Estimated body mass index is 25.94 kg/m  as calculated from the following:    Height as of this encounter: 1.549 m (5' 1\").    " Weight as of this encounter: 62.3 kg (137 lb 4.8 oz).          Physical Exam:    Pertinent exam findings on day of discharge include:  Gen: Seen up in chair. NAD. Pleasant and conversant.   CV: RRR on monitor. No edema.  Pulm: Non-labored breathing on room air.  Abd: Soft, non-tender, non-distended  Neuro: CNs grossly intact  Inc: C/D/I    Cem Correa PA-C  Cardiothoracic Surgery  [phone]

## 2022-09-03 NOTE — PLAN OF CARE
Pt is alert and oriented. Vitals stable. PRN Oxycodone given for surgical pain. PRN melatonin given for sleep at the bedtime. Constipation issue was resolved. Sukhwinder Thorpe RN

## 2022-09-04 ENCOUNTER — APPOINTMENT (OUTPATIENT)
Dept: OCCUPATIONAL THERAPY | Facility: HOSPITAL | Age: 57
End: 2022-09-04
Payer: COMMERCIAL

## 2022-09-04 VITALS
WEIGHT: 137.3 LBS | SYSTOLIC BLOOD PRESSURE: 100 MMHG | HEART RATE: 117 BPM | BODY MASS INDEX: 25.92 KG/M2 | OXYGEN SATURATION: 98 % | RESPIRATION RATE: 20 BRPM | DIASTOLIC BLOOD PRESSURE: 68 MMHG | TEMPERATURE: 98 F | HEIGHT: 61 IN

## 2022-09-04 LAB
CALCIUM, IONIZED MEASURED: 1.16 MMOL/L (ref 1.11–1.3)
HOLD SPECIMEN: NORMAL
ION CA PH 7.4: 1.23 MMOL/L (ref 1.11–1.3)
MAGNESIUM SERPL-MCNC: 1.7 MG/DL (ref 1.8–2.6)
PH: 7.51 (ref 7.35–7.45)
PHOSPHATE SERPL-MCNC: 3.3 MG/DL (ref 2.5–4.5)
POTASSIUM BLD-SCNC: 3.6 MMOL/L (ref 3.5–5)

## 2022-09-04 PROCEDURE — 250N000013 HC RX MED GY IP 250 OP 250 PS 637: Performed by: PHYSICIAN ASSISTANT

## 2022-09-04 PROCEDURE — 83735 ASSAY OF MAGNESIUM: CPT | Performed by: THORACIC SURGERY (CARDIOTHORACIC VASCULAR SURGERY)

## 2022-09-04 PROCEDURE — 99207 PR NO CHARGE LOS: CPT | Performed by: PHYSICIAN ASSISTANT

## 2022-09-04 PROCEDURE — 250N000013 HC RX MED GY IP 250 OP 250 PS 637: Performed by: THORACIC SURGERY (CARDIOTHORACIC VASCULAR SURGERY)

## 2022-09-04 PROCEDURE — 84132 ASSAY OF SERUM POTASSIUM: CPT | Performed by: THORACIC SURGERY (CARDIOTHORACIC VASCULAR SURGERY)

## 2022-09-04 PROCEDURE — 250N000011 HC RX IP 250 OP 636: Performed by: PHYSICIAN ASSISTANT

## 2022-09-04 PROCEDURE — 36415 COLL VENOUS BLD VENIPUNCTURE: CPT | Performed by: PHYSICIAN ASSISTANT

## 2022-09-04 PROCEDURE — 82330 ASSAY OF CALCIUM: CPT | Performed by: PHYSICIAN ASSISTANT

## 2022-09-04 PROCEDURE — 97110 THERAPEUTIC EXERCISES: CPT | Mod: GO

## 2022-09-04 PROCEDURE — 84100 ASSAY OF PHOSPHORUS: CPT | Performed by: THORACIC SURGERY (CARDIOTHORACIC VASCULAR SURGERY)

## 2022-09-04 RX ORDER — POTASSIUM CHLORIDE 750 MG/1
10 TABLET, EXTENDED RELEASE ORAL DAILY
Status: DISCONTINUED | OUTPATIENT
Start: 2022-09-04 | End: 2022-09-04 | Stop reason: HOSPADM

## 2022-09-04 RX ORDER — PANTOPRAZOLE SODIUM 40 MG/1
40 TABLET, DELAYED RELEASE ORAL DAILY
Qty: 30 TABLET | Refills: 0 | Status: SHIPPED | OUTPATIENT
Start: 2022-09-04 | End: 2022-09-04

## 2022-09-04 RX ORDER — PANTOPRAZOLE SODIUM 40 MG/1
40 TABLET, DELAYED RELEASE ORAL DAILY
Qty: 30 TABLET | Refills: 0 | Status: SHIPPED | OUTPATIENT
Start: 2022-09-04

## 2022-09-04 RX ORDER — TRAMADOL HYDROCHLORIDE 50 MG/1
50 TABLET ORAL EVERY 8 HOURS PRN
Qty: 20 TABLET | Refills: 0 | Status: SHIPPED | OUTPATIENT
Start: 2022-09-04 | End: 2022-09-04

## 2022-09-04 RX ORDER — FUROSEMIDE 20 MG
20 TABLET ORAL DAILY
Qty: 7 TABLET | Refills: 0 | Status: SHIPPED | OUTPATIENT
Start: 2022-09-04 | End: 2022-10-05

## 2022-09-04 RX ORDER — FUROSEMIDE 20 MG
20 TABLET ORAL DAILY
Qty: 7 TABLET | Refills: 0 | Status: SHIPPED | OUTPATIENT
Start: 2022-09-04 | End: 2022-09-04

## 2022-09-04 RX ORDER — ACETAMINOPHEN 325 MG/1
650 TABLET ORAL EVERY 4 HOURS PRN
Qty: 30 TABLET | Refills: 0 | Status: SHIPPED | OUTPATIENT
Start: 2022-09-04

## 2022-09-04 RX ORDER — TRAMADOL HYDROCHLORIDE 50 MG/1
50 TABLET ORAL EVERY 8 HOURS PRN
Qty: 20 TABLET | Refills: 0 | Status: SHIPPED | OUTPATIENT
Start: 2022-09-04 | End: 2022-10-05

## 2022-09-04 RX ORDER — LIDOCAINE 4 G/G
2 PATCH TOPICAL EVERY 24 HOURS
Qty: 20 PATCH | Refills: 0 | Status: SHIPPED | OUTPATIENT
Start: 2022-09-04

## 2022-09-04 RX ORDER — POTASSIUM CHLORIDE 750 MG/1
10 TABLET, EXTENDED RELEASE ORAL DAILY
Qty: 7 TABLET | Refills: 0 | Status: SHIPPED | OUTPATIENT
Start: 2022-09-04 | End: 2022-10-05

## 2022-09-04 RX ORDER — POLYETHYLENE GLYCOL 3350 17 G/17G
17 POWDER, FOR SOLUTION ORAL DAILY
Qty: 30 PACKET | Refills: 0 | Status: SHIPPED | OUTPATIENT
Start: 2022-09-05 | End: 2022-10-05

## 2022-09-04 RX ORDER — POTASSIUM CHLORIDE 750 MG/1
10 TABLET, EXTENDED RELEASE ORAL DAILY
Qty: 7 TABLET | Refills: 0 | Status: SHIPPED | OUTPATIENT
Start: 2022-09-04 | End: 2022-09-04

## 2022-09-04 RX ORDER — MAGNESIUM OXIDE 400 MG/1
400 TABLET ORAL EVERY 4 HOURS
Status: COMPLETED | OUTPATIENT
Start: 2022-09-04 | End: 2022-09-04

## 2022-09-04 RX ORDER — POTASSIUM CHLORIDE 1500 MG/1
20 TABLET, EXTENDED RELEASE ORAL ONCE
Status: COMPLETED | OUTPATIENT
Start: 2022-09-04 | End: 2022-09-04

## 2022-09-04 RX ORDER — FUROSEMIDE 20 MG
20 TABLET ORAL DAILY
Status: DISCONTINUED | OUTPATIENT
Start: 2022-09-04 | End: 2022-09-04 | Stop reason: HOSPADM

## 2022-09-04 RX ORDER — MAGNESIUM SULFATE HEPTAHYDRATE 40 MG/ML
2 INJECTION, SOLUTION INTRAVENOUS ONCE
Status: COMPLETED | OUTPATIENT
Start: 2022-09-04 | End: 2022-09-04

## 2022-09-04 RX ORDER — OXYCODONE HYDROCHLORIDE 5 MG/1
5 TABLET ORAL EVERY 4 HOURS PRN
Qty: 16 TABLET | Refills: 0 | Status: SHIPPED | OUTPATIENT
Start: 2022-09-04 | End: 2022-09-04

## 2022-09-04 RX ORDER — ACETAMINOPHEN 325 MG/1
650 TABLET ORAL EVERY 4 HOURS PRN
Qty: 30 TABLET | Refills: 0 | Status: SHIPPED | OUTPATIENT
Start: 2022-09-04 | End: 2022-09-04

## 2022-09-04 RX ORDER — OXYCODONE HYDROCHLORIDE 5 MG/1
5 TABLET ORAL EVERY 4 HOURS PRN
Qty: 16 TABLET | Refills: 0 | Status: SHIPPED | OUTPATIENT
Start: 2022-09-04 | End: 2022-10-05

## 2022-09-04 RX ADMIN — Medication 1000 MCG: at 08:23

## 2022-09-04 RX ADMIN — FUROSEMIDE 20 MG: 10 INJECTION, SOLUTION INTRAMUSCULAR; INTRAVENOUS at 08:24

## 2022-09-04 RX ADMIN — LIDOCAINE PATCH 4% 2 PATCH: 40 PATCH TOPICAL at 08:24

## 2022-09-04 RX ADMIN — Medication 250 MG: at 08:23

## 2022-09-04 RX ADMIN — PANTOPRAZOLE SODIUM 40 MG: 40 TABLET, DELAYED RELEASE ORAL at 08:23

## 2022-09-04 RX ADMIN — ACETAMINOPHEN 975 MG: 325 TABLET ORAL at 14:56

## 2022-09-04 RX ADMIN — LEVOTHYROXINE SODIUM 175 MCG: 0.03 TABLET ORAL at 06:10

## 2022-09-04 RX ADMIN — ACETAMINOPHEN 975 MG: 325 TABLET ORAL at 06:09

## 2022-09-04 RX ADMIN — Medication 25 MCG: at 08:23

## 2022-09-04 RX ADMIN — PSYLLIUM HUSK 1 PACKET: 3.4 POWDER ORAL at 08:25

## 2022-09-04 RX ADMIN — SENNOSIDES AND DOCUSATE SODIUM 1 TABLET: 8.6; 5 TABLET ORAL at 08:28

## 2022-09-04 RX ADMIN — ACETAMINOPHEN 650 MG: 325 TABLET ORAL at 11:54

## 2022-09-04 RX ADMIN — POTASSIUM CHLORIDE 20 MEQ: 1500 TABLET, EXTENDED RELEASE ORAL at 08:23

## 2022-09-04 RX ADMIN — B-COMPLEX W/ C & FOLIC ACID TAB 1 TABLET: TAB at 08:25

## 2022-09-04 RX ADMIN — APIXABAN 5 MG: 5 TABLET, FILM COATED ORAL at 08:23

## 2022-09-04 RX ADMIN — Medication 400 MG: at 16:38

## 2022-09-04 RX ADMIN — TRAMADOL HYDROCHLORIDE 50 MG: 50 TABLET, COATED ORAL at 16:38

## 2022-09-04 RX ADMIN — Medication 1 TABLET: at 08:23

## 2022-09-04 RX ADMIN — Medication 400 MG: at 11:54

## 2022-09-04 RX ADMIN — OXYCODONE HYDROCHLORIDE 10 MG: 5 TABLET ORAL at 00:10

## 2022-09-04 RX ADMIN — POTASSIUM CHLORIDE 10 MEQ: 750 TABLET, EXTENDED RELEASE ORAL at 11:50

## 2022-09-04 RX ADMIN — MAGNESIUM SULFATE HEPTAHYDRATE 2 G: 2 INJECTION, SOLUTION INTRAVENOUS at 08:28

## 2022-09-04 RX ADMIN — TRAMADOL HYDROCHLORIDE 50 MG: 50 TABLET, COATED ORAL at 08:23

## 2022-09-04 ASSESSMENT — ACTIVITIES OF DAILY LIVING (ADL)
ADLS_ACUITY_SCORE: 25

## 2022-09-04 NOTE — PLAN OF CARE
Problem: Fluid and Electrolyte Imbalance (Cardiovascular Surgery)  Goal: Fluid and Electrolyte Balance (Cardiovascular Surgery)  Outcome: Ongoing, Progressing     Problem: Pain (Cardiovascular Surgery)  Goal: Acceptable Pain Control  9/3/2022 2226 by Felicitas Douglas RN  Outcome: Ongoing, Progressing  9/3/2022 2225 by Felicitas Douglas RN  Outcome: Ongoing, Progressing     Problem: Postoperative Urinary Retention (Cardiovascular Surgery)  Goal: Effective Urinary Elimination (Cardiovascular Surgery)  Outcome: Ongoing, Progressing     Problem: Cerebral Tissue Perfusion (Cardiovascular Surgery)  Goal: Optimal Cerebral Tissue Perfusion (Cardiovascular Surgery)  9/3/2022 2226 by Felicitas Douglas RN  Outcome: Ongoing, Progressing  9/3/2022 2225 by Felicitas Douglas RN  Outcome: Ongoing, Progressing   Goal Outcome Evaluation:  Pt has dizziness and low BP during therapy this morning, and Bp improved after she  back to bed on flat position. MD notified and she had one time albumin. Pt has been up in chair and walking with cardiac rehab and she tolerated well. this evening.  Rhythm has been a fib with controled HR .she denies any SOB and chest pain. Maintaining oxygen saturation on room air.  Pain is managing with schedule tylenol and Prn tramadol. No new skin concerns noted. Incision site is dry and intact and she had shower.

## 2022-09-04 NOTE — PROGRESS NOTES
Care Management Discharge Note    Discharge Date: 09/04/2022       Discharge Disposition: Home    Discharge Services:  Home    Discharge DME:  Home    Discharge Transportation: family or friend will provide    Private pay costs discussed: Not applicable    PAS Confirmation Code:  NA  Patient/family educated on Medicare website which has current facility and service quality ratings:      Education Provided on the Discharge Plan:    Persons Notified of Discharge Plans: Nursing;   Patient/Family in Agreement with the Plan:  yes    Handoff Referral Completed: No    Additional Information:  Discharge to home.         Noreen Veliz RN      Length of Stay (days): 12    Expected Discharge Date: 09/04/2022 or 9/5/2022       Concerns to be Addressed:   Postoperative care and recovery (see OP note of 8/29): pending clearance for discharge, final orders.   Patient plan of care discussed at interdisciplinary rounds: Yes    Anticipated Discharge Disposition:  Home     Anticipated Discharge Services:  Therapy agrees with a home discharge, family support.  Anticipated Discharge DME:  Per therapy (if indicated).    Patient/family educated on Medicare website which has current facility and service quality ratings:  NA  Education Provided on the Discharge Plan:  Per team  Patient/Family in Agreement with the Plan:  yes    Referrals Placed by CM/SW:  none  Private pay costs discussed: Not applicable     Additional Information:  Patient lives in a house with her fiance Esau and her 17 year old son.  She is independent with all activities of daily living at baseline and drives and works part time. Her goal is to return home at discharge and therapy agrees with the plan. Care management will continue to watch team recommendations. Anticipate family to transport at discharge.    Noreen Veliz RN

## 2022-09-04 NOTE — PROGRESS NOTES
CVTS Daily Progress Note   POD # 6 S/P left atrial mass removal, PVI/LAAE  Attending: Dr. Jeniffer Loredo MD  LOS: 12    SUBJECTIVE/INTERVAL EVENTS:  No acute events overnight. Normotensive. Patient progressing well otherwise. Maintaining oxygen saturations on room air. Up to chair this AM and ambulating with therapy. Pain well controlled. - BM / +flatus. Tolerating diet without nausea. UOP adequate. Chest tube removed 09/01/2022. Patient denies new chest pain, shortness of breath, abdominal pain, calf pain, nausea. Patient has no questions today.    OBJECTIVE:  Temp:  [97.8  F (36.6  C)-99  F (37.2  C)] 98.3  F (36.8  C)  Pulse:  [] 103  Resp:  [16-18] 18  BP: ()/(51-84) 118/84  SpO2:  [96 %-99 %] 99 %  Resp: 18    Vitals:    08/31/22 0600 09/01/22 0630 09/02/22 0529 09/03/22 0345   Weight: 67.6 kg (149 lb 1.6 oz) 65.5 kg (144 lb 8 oz) 65.1 kg (143 lb 9.6 oz) 63.3 kg (139 lb 9.6 oz)    09/04/22 0609   Weight: 62.3 kg (137 lb 4.8 oz)       Current Medications:    Scheduled Meds:    acetaminophen  975 mg Oral Q8H     apixaban ANTICOAGULANT  5 mg Oral BID     cyanocobalamin  1,000 mcg Oral Daily     furosemide  20 mg Intravenous BID     levothyroxine  175 mcg Oral QAM AC     lidocaine  2 patch Transdermal Q24H     lidocaine   Transdermal Q8H     magnesium oxide  400 mg Oral Q4H     magnesium sulfate  2 g Intravenous Once     multivitamin w/minerals  1 tablet Oral Daily     pantoprazole  40 mg Oral Daily     polyethylene glycol  17 g Oral Daily     psyllium  1 packet Oral Daily     senna-docusate  1 tablet Oral BID     sodium chloride (PF)  3 mL Intracatheter Q8H     vitamin B complex with vitamin C  1 tablet Oral Daily     vitamin C  250 mg Oral Daily     Vitamin D3  25 mcg Oral Daily     Continuous Infusions:  PRN Meds:.acetaminophen, bisacodyl, calcium gluconate, calcium gluconate, calcium gluconate, glucose **OR** dextrose **OR** glucagon, hydrALAZINE, lactated ringers, lidocaine 4%, lidocaine (buffered  or not buffered), magnesium hydroxide, melatonin, naloxone **OR** naloxone **OR** naloxone **OR** naloxone, ondansetron **OR** ondansetron, oxyCODONE **OR** oxyCODONE, prochlorperazine **OR** prochlorperazine, sodium chloride (PF), traMADol    Cardiographics:    Telemetry monitoring demonstrates rate controlled A. fib with rates in the 90s per my personal review.    Imaging:  All available imaging reviewed, no acute concerns    Labs, personally reviewed.  BMP  Recent Labs   Lab 09/04/22  0528 09/03/22  0454 09/02/22  1416 09/02/22  0502 09/01/22  0753 09/01/22  0503 08/31/22  2110 08/31/22  1558 08/31/22  0746 08/31/22  0456 08/30/22  0700 08/30/22  0510 08/29/22  1759 08/29/22  1645   NA  --   --   --  139  --  140  --   --   --  135*  --  147*  --  141   POTASSIUM 3.6 3.8 3.5 3.4*  --  3.5  3.5  --   --   --  4.1  --  4.0   < > 3.1*   CHLORIDE  --   --   --  104  --  107  --   --   --  106  --  116*  --  114*   YEVGENIY  --   --   --  8.6  --   --   --   --   --  7.9*  --  7.6*  --  8.0*   CO2  --   --   --  28  --  29  --   --   --  24  --  26  --  21*   BUN  --   --   --  7*  --   --   --   --   --  9  --  13  --  13   CR  --   --   --  0.55*  --   --   --   --   --  0.57*  --  0.71  --  0.73   GLC  --   --   --  103 130*  --  142* 121*   < > 114   < > 120  118*   < > 150*    < > = values in this interval not displayed.     CBC  Recent Labs   Lab 09/02/22  0502 08/30/22  0510 08/29/22  2303 08/29/22  1645 08/29/22  1533   WBC 8.4 10.5  --  13.6* 12.0*   RBC 2.40* 2.78*  --  4.11 3.12*   HGB 8.0* 9.2* 10.0* 13.5 10.4*   HCT 22.9* 26.4*  --  38.9 29.6*   MCV 95 95  --  95 95   MCH 33.3* 33.1*  --  32.8 33.3*   MCHC 34.9 34.8  --  34.7 35.1   RDW 13.2 13.0  --  13.2 13.2   * 128*  --  135* 156     INR  Recent Labs   Lab 08/29/22  1645 08/29/22  1533   INR 1.39* 1.44*      Hepatic Panel  Recent Labs   Lab 08/30/22  0510 08/29/22  1645   AST 56* 47*   ALT 26 27   ALKPHOS 27* 36*   BILITOTAL 0.6 0.8   ALBUMIN 3.8  "3.6       Magnesium   Date Value Ref Range Status   09/04/2022 1.7 (L) 1.8 - 2.6 mg/dL Final   09/03/2022 1.8 1.8 - 2.6 mg/dL Final   09/02/2022 1.7 (L) 1.8 - 2.6 mg/dL Final        I/O:  No intake/output data recorded.       Blood pressure 118/84, pulse 103, temperature 98.3  F (36.8  C), temperature source Oral, resp. rate 18, height 1.549 m (5' 1\"), weight 62.3 kg (137 lb 4.8 oz), SpO2 99 %.  Vitals:    09/02/22 0529 09/03/22 0345 09/04/22 0609   Weight: 65.1 kg (143 lb 9.6 oz) 63.3 kg (139 lb 9.6 oz) 62.3 kg (137 lb 4.8 oz)        Physical Exam:  Weight 62.3 kg from 63.3 kg yesterday, preop weight 59.9 kg.   24 hr Fluid status; net loss -70-83 mL since admission  MAPs:    Gen: A&Ox4, out of bed to chair, with NAD  Neuro: no focal deficits   CV: Rate controlled A. fib, no murmurs, rubs or gallops.  No JVD  Pulm: Lungs are diminished to bases, no wheezing or rhonchi, normal breathing on RA  Abd: nondistended, normal BS, soft, nontender  Ext: No peripheral edema appreciated  Incision: clean, dry, intact, no erythema, sternum stable  Tubes/drain sites: dressing clean and dry.     ASSESSMENT/PLAN:  Torrie Meyer is a 57 year old female with a history of atrial fibrillation, cardiomyopathy who is POD #6 S/P left myxoma removal, PVI and left atrial appendage excision.  Awake and alert, out of bed to chair, with no apparent distress  Rate controlled A. fib, may start Eliquis given previous history of ablation  Postop respiratory sufficiency resolved, now on room air, saturation 96%  Adequate inspiratory effort, I-S 1250  Incisions are clean dry intact, lungs are diminished to bases  Abdomen soft and nontender  Urine output and BM adequate  Weight 23.3 kg from 63.3 kg yesterday, preop weight 59.9 kg  Continue Lasix 20 mg twice a day for 7 days  Ready for discharge to home today 09/04/2022  No driving for 4 weeks, no lifting, no pushing, no pulling more than 10 pounds for 6 to 8 weeks  No bathtub bathing and no swimming " for bathing for 8 to 10 weeks  Shower daily and dab incision dry, and leave open to air, and monitor for signs or symptoms of infection which include redness, swelling, drainage, and or warmth at incision site, if any of these is present, to call CV surgery at [phone] and or go to the nearest emergency room.  Follow-up with your PCP Merry Martell MD within 7 to 10 days of hospital discharge  Follow-up with your cardiac surgeon Dr. Jeniffer Loredo MD's GAETANO on 10/05/2022 at 10 AM at the [facility].  Follow-up with your primary cardiologist Dr. Anahi Sawant MD on 10/13/2022 at 1:20 PM at the [facility].             NEURO:   - Scheduled Tylenol and PRN Tylenol/oxycodone/lidocaine patches/Toradol for pain    CV:   - Normotensive/hypotensive  - No indication for ASA or statin  - Chest removed 09/01/2022  - Rate controlled A. fib, will start Eliquis 5 mg twice a day    PULM:   - Maintaining oxygen saturations on room air  - Encourage pulmonary toilet    FEN/GI:  - Continue electrolyte replacement protocol  - Diet: Regular  - Bowel regimen  - PTA MVI, vitamins B12, B, C, D3    RENAL:  - Adequate UOP/hr. Continue to monitor closely.  - Discontinue Arreaga  - Diuresis with Lasix 40mg IV BID     HEME:  - Acute blood loss anemia post-op.   - No bleeding concerns. Hep SQ, ASA    ID:  - Suzanne op ppx complete, afebrile. No concerns for infection    ENDO:   - No issues with blood sugars; discontinue SSI  - PTA Synthroid  - PTA Fosamax     PPx:   - DVT: SCDs, SQ heparin TID, ambulation   - GI: Protonix 40mg PO daily    DISPO:   - Discharge to home today 09/04/2022     Patient discussed with Dr. Jeniffer Loredo MD.     Cem Correa PA-C  Cardiothoracic Surgery  Pager [phone]    8:30 AM   September 4, 2022

## 2022-09-04 NOTE — PROGRESS NOTES
Patient Name: Torrie Meyer   MRN: [id number]   Date of Admission: 8/23/2022    Procedure: Procedure(s):  LEFT ATRIAL MXYOMA REMOVAL, ANESTHESIA TRANSESOPHAGEAL ECHCARDIOGRAM,BILATERAL PULMONARY VEIN ISOLATION, EPIAORTIC ULTRASOUND  REMOVAL LEFT ATRIAL APPENDAGE    Post Op day #: 6 night shift.     Pt. Has wires and chest tube out.     She remains in afib,  Stayed here due to low bp and  And heart rate was 11- to 120,  Report states she goes up went she walks around.    Tonight she is in pain.    She did have a sower this evening and PT in afternoon.     Gave 0xy for pain, and rates 9/10    Is very concerned  about pain going home.     Incision clean dry and intact.

## 2022-09-04 NOTE — PLAN OF CARE
Cardiac Rehab Discharge Summary    Reason for therapy discharge:    All goals and outcomes met, no further needs identified.    Progress towards therapy goal(s). See goals on Care Plan in [facility] electronic health record for goal details.  Goals met    Therapy recommendation(s):    OP Cardiac Rehab

## 2022-09-04 NOTE — PROGRESS NOTES
Pt. Thanked me for her pain medication last night and stated she slept well.  This am with scheduled tylenol she stated she definitely felt she is ready for pain medication.  She said, she will wait and do tramadol if I need.  But stated the oxycontin really helped at bed  Time but doesn't want to take  Due to afraid of constipation.    She asked about the lidocaine patch and if she will have a prescription for them when she goes home.

## 2022-09-04 NOTE — PLAN OF CARE
"  Problem: Plan of Care - These are the overarching goals to be used throughout the patient stay.    Goal: Plan of Care Review/Shift Note  Description: The Plan of Care Review/Shift note should be completed every shift.  The Outcome Evaluation is a brief statement about your assessment that the patient is improving, declining, or no change.  This information will be displayed automatically on your shift note.  Outcome: Met  Goal: Patient-Specific Goal (Individualized)  Description: You can add care plan individualizations to a care plan. Examples of Individualization might be:  \"Parent requests to be called daily at 9am for status\", \"I have a hard time hearing out of my right ear\", or \"Do not touch me to wake me up as it startles me\".  Outcome: Met  Goal: Absence of Hospital-Acquired Illness or Injury  Outcome: Met  Goal: Optimal Comfort and Wellbeing  Outcome: Met  Goal: Readiness for Transition of Care  Outcome: Met     Problem: Dysrhythmia  Goal: Normalized Cardiac Rhythm  Outcome: Met     Problem: Risk for Delirium  Goal: Optimal Coping  Outcome: Met  Goal: Improved Behavioral Control  Outcome: Met  Goal: Improved Attention and Thought Clarity  Outcome: Met  Goal: Improved Sleep  Outcome: Met     Problem: Activity Intolerance (Cardiovascular Surgery)  Goal: Improved Activity Tolerance  Outcome: Met     Problem: Adjustment to Surgery (Cardiovascular Surgery)  Goal: Optimal Coping with Heart Surgery  Outcome: Met     Problem: Bleeding (Cardiovascular Surgery)  Goal: Bleeding (Cardiovascular Surgery)  Outcome: Met     Problem: Bowel Motility Impaired (Cardiovascular Surgery)  Goal: Effective Bowel Elimination (Cardiovascular Surgery)  Outcome: Met     Problem: Cardiac Function Impaired (Cardiovascular Surgery)  Goal: Effective Cardiac Function  Outcome: Met     Problem: Cerebral Tissue Perfusion (Cardiovascular Surgery)  Goal: Optimal Cerebral Tissue Perfusion (Cardiovascular Surgery)  Outcome: Met     Problem: " Fluid and Electrolyte Imbalance (Cardiovascular Surgery)  Goal: Fluid and Electrolyte Balance (Cardiovascular Surgery)  Outcome: Met     Problem: Glycemic Control Impaired (Cardiovascular Surgery)  Goal: Blood Glucose Level Within Targeted Range (Cardiovascular Surgery)  Outcome: Met     Problem: Infection (Cardiovascular Surgery)  Goal: Absence of Infection Signs and Symptoms  Outcome: Met     Problem: Ongoing Anesthesia Effects (Cardiovascular Surgery)  Goal: Anesthesia/Sedation Recovery  Outcome: Met     Problem: Pain (Cardiovascular Surgery)  Goal: Acceptable Pain Control  Outcome: Met     Problem: Postoperative Nausea and Vomiting (Cardiovascular Surgery)  Goal: Nausea and Vomiting Relief (Cardiovascular Surgery)  Outcome: Met     Problem: Postoperative Urinary Retention (Cardiovascular Surgery)  Goal: Effective Urinary Elimination (Cardiovascular Surgery)  Outcome: Met     Problem: Respiratory Compromise (Cardiovascular Surgery)  Goal: Effective Oxygenation and Ventilation (Cardiovascular Surgery)  Outcome: Met  Intervention: Promote Airway Secretion Clearance  Recent Flowsheet Documentation  Taken 9/4/2022 1630 by Felicitas Douglas RN  Cough And Deep Breathing: done independently per patient  Taken 9/4/2022 0820 by Felicitas Douglas RN  Cough And Deep Breathing: done independently per patient   Goal Outcome Evaluation:  VS stable.  She denies any Sob, chest pain and dizziness. Rhythm has been A- fib with controled HR.  Incision care and chest tube site care is done. Pt  discharged to home and medication and discharge instruction  done with her and her partner.  Pt denies any question and concerns about discharge. All belongs send with her. She transported by W/C

## 2022-09-06 ENCOUNTER — PATIENT OUTREACH (OUTPATIENT)
Dept: CARE COORDINATION | Facility: CLINIC | Age: 57
End: 2022-09-06

## 2022-09-06 NOTE — PROGRESS NOTES
Clinic Care Coordination Contact  [facility]: Post-Discharge Note  SITUATION                                                      Admission:    Admission Date: 08/23/22   Reason for Admission: Atrial myxoma  Discharge:   Discharge Date: 09/04/22  Discharge Diagnosis: Atrial myxoma    BACKGROUND                                                      Per hospital discharge summary and inpatient provider notes:    Torrie Meyer is a 57 year old female with history of hypothyroidism.  Patient was scheduled to undergo a colonoscopy, and they preprocedure evaluation patient to be in A. fib.  Echocardiogram was ordered that showed a 4 x 4 x 4 cm mobile mass on the left atrium.    Patient was admitted to [facility] on 08/23/2022 through the ED for inpatient optimization and further evaluation.    Following this finding, patient was referred to CV surgery for evaluation for left atrial mass excision.     Following CV surgery evaluation, patient was deemed a candidate for left atrial myxoma excision.  Following inpatient optimization, patient was taking today operating room on 08/29/2022 where patient underwent left atrial mass excision, bilateral pulmonary vein isolation and left atrial appendage ligation. Surgery was uneventful and patient was brought to the ICU post-operatively.  She was extubated on POD#0 and weaned from pressors. Patient was awake and alert, afebrile, and with stable vitals. Insulin drip was discontinued and she was transitioned to a sliding scale.  She was transferred to general telemetry status on POD#4 where patient has had return of bowel function, is maintaining oxygen saturations on room air.she had her chest tubes removed 09/01/2022 but patient wires were left in because patient was pacer wire dependent.  On postop day #3, patient went into A. fib with mostly rate control.  Cardiology evaluated and did not see any need for cardioversion as patient was recovering from significant  "redeem dysfunction postop.  Pacer wires were pulled on 09/03/2022.  She has no complaints of chest pain or shortness of breath on 09/04/22, patient is stable enough to be discharged to home.     Of note, patient is discharged on Eliquis 5 mg twice a day for 2 months.     ASSESSMENT           Discharge Assessment  How are you doing now that you are home?: \" Think im doing good took a shower today and been up walking around \"  How are your symptoms? (Red Flag symptoms escalate to triage hotline per guidelines): Improved  Do you feel your condition is stable enough to be safe at home until your provider visit?: Yes  Does the patient have questions regarding their discharge instructions? : No  Were you started on any new medications or were there changes to any of your previous medications? : Yes  Do you have questions regarding any of your medications? : No  Do you have all of your needed medical supplies or equipment (DME)?  (i.e. oxygen tank, CPAP, cane, etc.): Yes  Discharge follow-up appointment scheduled within 14 calendar days? : No  Is patient agreeable to assistance with scheduling? : No (\" Waiting for rehab\")    Post-op ([facility] Only)  If the patient had a surgery or procedure, do they have any questions for a nurse?: No             PLAN                                                      Outpatient Plan:   Follow up appointment with Primary Care Physician: Merry Cash within 7-10 days of discharge from the hospital.  Follow up appointment with Specialist:   Follow-up with your cardiac surgeon Dr. Jeniffer Loredo MD's GAETANO on 10/05/2022 at 10 AM at the [facility].  Follow-up with your primary cardiologist Dr. Anahi Sawant MD on 10/13/2022 at 1:20 PM at the [facility].     Diet: Cardiac     Activity/Restrictions: As tolerated with sternal precautions in mind (see below). No driving for 4 weeks or while on pain medication.      - Shower " "and wash your incisions daily with soap and water. No tub baths/hot tubs for 4 weeks. An antibacterial soap such as Dial or Safeguard is recommended.     - Check your incisions every day. If you notice any redness, drainage, or anything unusual, please call the surgeons office.     - No driving for 4 weeks after surgery or while on pain medication      - Do not lift anything more than 10 pounds for 6 weeks after surgery. After 6 weeks, advance lifting is tolerated.     - You may have watery drainage from your chest tube site for 2-3 weeks after surgery. Your may cover with a Band-Aid to protect your clothing. Remove the Band-Aid every day and wash the site.     - If you have a leg lesion, you may have swelling for 2-3 months. Elevate your leg any time you are not walking.     - If you feel any \"popping\" or \"clicking\" sensations in your chest, your arms are out too far or you are putting too much weight into arm movements. Do not reach over your head or out to the side to pull something. Do not do any arm exercises or use any exercise equipment that involves arm movement. If you feel your sternum moving, call the surgeon's office.     - Increase your daily activity as explained by Cardiac Rehab. You are encouraged to enroll in an Outpatient Cardiac Rehab Program.     - No active sports using your upper arms for 3 months. This includes fishing, hunting, bowling, swimming, tennis or golf.     - No physical activity such as cutting the grass, raking, vacuuming, changing sheets on your bed, snow shoveling, or using a  for 3 months.     - Use incentive spirometer 6-8 times per day for 2 weeks.       Future Appointments   Date Time Provider Department Center   10/5/2022 10:00 AM JN CVTS GAETANO [facility]   10/13/2022  1:20 PM Anahi Sawant MD [facility]   11/22/2022 11:15 AM MICDX1 JNDXIC [facility]         For any urgent concerns, please contact our 24 hour nurse triage line: [phone] ([phone])   "       Claribel Garcia MA

## 2022-09-12 ENCOUNTER — TRANSFERRED RECORDS (OUTPATIENT)
Dept: CARDIOLOGY | Facility: CLINIC | Age: 57
End: 2022-09-12

## 2022-09-12 ENCOUNTER — LAB REQUISITION (OUTPATIENT)
Dept: LAB | Facility: CLINIC | Age: 57
End: 2022-09-12

## 2022-09-12 DIAGNOSIS — D21.9 BENIGN NEOPLASM OF CONNECTIVE AND OTHER SOFT TISSUE, UNSPECIFIED: ICD-10-CM

## 2022-09-12 LAB
BASE EXCESS BLDA CALC-SCNC: -1.8 MMOL/L
BASE EXCESS BLDA CALC-SCNC: -3 MMOL/L
BASE EXCESS BLDA CALC-SCNC: 1.1 MMOL/L
BASE EXCESS BLDV CALC-SCNC: 2.3 MMOL/L
CA-I BLD-MCNC: 1.03 MMOL/L (ref 1.11–1.3)
CA-I BLD-MCNC: 1.08 MMOL/L (ref 1.11–1.3)
CA-I BLD-MCNC: 1.09 MMOL/L (ref 1.11–1.3)
CA-I BLD-MCNC: 1.27 MMOL/L (ref 1.11–1.3)
COHGB MFR BLD: 100 % (ref 96–97)
GLUCOSE BLD-MCNC: 118 MG/DL (ref 70–125)
GLUCOSE BLD-MCNC: 140 MG/DL (ref 70–125)
GLUCOSE BLD-MCNC: 76 MG/DL (ref 70–125)
GLUCOSE BLD-MCNC: 99 MG/DL (ref 70–125)
HCO3 BLDA-SCNC: 23 MMOL/L (ref 23–29)
HCO3 BLDA-SCNC: 23 MMOL/L (ref 23–29)
HCO3 BLDA-SCNC: 26 MMOL/L (ref 23–29)
HCO3 BLDV-SCNC: 26 MMOL/L (ref 24–30)
HGB BLD-MCNC: 10.2 G/DL (ref 11.7–15.7)
HGB BLD-MCNC: 10.5 G/DL (ref 11.7–15.7)
HGB BLD-MCNC: 10.6 G/DL (ref 11.7–15.7)
HGB BLD-MCNC: 11.8 G/DL (ref 11.7–15.7)
LACTATE BLD-SCNC: 0.5 MMOL/L (ref 0.7–2)
LACTATE BLD-SCNC: 0.5 MMOL/L (ref 0.7–2)
LACTATE BLD-SCNC: 0.8 MMOL/L (ref 0.7–2)
LACTATE BLD-SCNC: 0.8 MMOL/L (ref 0.7–2)
PATH REPORT.COMMENTS IMP SPEC: NORMAL
PATH REPORT.FINAL DX SPEC: NORMAL
PATH REPORT.GROSS SPEC: NORMAL
PATH REPORT.MICROSCOPIC SPEC OTHER STN: NORMAL
PATH REPORT.RELEVANT HX SPEC: NORMAL
PCO2 BLDA: 28 MM HG (ref 35–45)
PCO2 BLDA: 37 MM HG (ref 35–45)
PCO2 BLDA: 38 MM HG (ref 35–45)
PCO2 BLDV: 40 MM HG (ref 35–50)
PH BLDA: 7.4 [PH] (ref 7.37–7.44)
PH BLDA: 7.43 [PH] (ref 7.37–7.44)
PH BLDA: 7.47 [PH] (ref 7.37–7.44)
PH BLDV: 7.43 [PH] (ref 7.35–7.45)
PHOTO IMAGE: NORMAL
PO2 BLDA: 246 MM HG (ref 80–90)
PO2 BLDA: 314 MM HG (ref 80–90)
PO2 BLDA: 418 MM HG (ref 80–90)
PO2 BLDV: 45 MM HG (ref 25–47)
POTASSIUM BLD-SCNC: 3 MMOL/L (ref 3.5–5)
POTASSIUM BLD-SCNC: 3.3 MMOL/L (ref 3.5–5)
POTASSIUM BLD-SCNC: 4 MMOL/L (ref 3.5–5)
POTASSIUM BLD-SCNC: 4.9 MMOL/L (ref 3.5–5)
SATV LHE POCT: 84.2 % (ref 70–75)
SODIUM BLD-SCNC: 138 MMOL/L (ref 136–145)
SODIUM BLD-SCNC: 141 MMOL/L (ref 136–145)

## 2022-09-12 PROCEDURE — 80053 COMPREHEN METABOLIC PANEL: CPT | Performed by: FAMILY MEDICINE

## 2022-09-13 ENCOUNTER — TELEPHONE (OUTPATIENT)
Dept: CARDIOLOGY | Facility: CLINIC | Age: 57
End: 2022-09-13

## 2022-09-13 LAB
ALBUMIN SERPL BCG-MCNC: 4.7 G/DL (ref 3.5–5.2)
ALP SERPL-CCNC: 87 U/L (ref 35–104)
ALT SERPL W P-5'-P-CCNC: 29 U/L (ref 10–35)
ANION GAP SERPL CALCULATED.3IONS-SCNC: 14 MMOL/L (ref 7–15)
AST SERPL W P-5'-P-CCNC: 27 U/L (ref 10–35)
BILIRUB SERPL-MCNC: 0.4 MG/DL
BUN SERPL-MCNC: 17.4 MG/DL (ref 6–20)
CALCIUM SERPL-MCNC: 10.2 MG/DL (ref 8.6–10)
CHLORIDE SERPL-SCNC: 102 MMOL/L (ref 98–107)
CREAT SERPL-MCNC: 0.74 MG/DL (ref 0.51–0.95)
DEPRECATED HCO3 PLAS-SCNC: 22 MMOL/L (ref 22–29)
GFR SERPL CREATININE-BSD FRML MDRD: >90 ML/MIN/1.73M2
GLUCOSE SERPL-MCNC: 106 MG/DL (ref 70–99)
POTASSIUM SERPL-SCNC: 5.4 MMOL/L (ref 3.4–5.3)
PROT SERPL-MCNC: 6.8 G/DL (ref 6.4–8.3)
SODIUM SERPL-SCNC: 138 MMOL/L (ref 136–145)

## 2022-09-13 NOTE — TELEPHONE ENCOUNTER
[facility] Call Center    Phone Message    May a detailed message be left on voicemail: yes     Reason for Call: Other: Prior Auth needed for cardiac rehab      Heart Surgery on: 8/29/22 by Dr Klaudia Loredo    [facility] covers with Prior Auth for Cardiac Rehab    Call Provider Line:  [phone]    Action Taken: Other: Cardiology    Travel Screening: Not Applicable

## 2022-09-19 ENCOUNTER — TRANSFERRED RECORDS (OUTPATIENT)
Dept: HEALTH INFORMATION MANAGEMENT | Facility: CLINIC | Age: 57
End: 2022-09-19

## 2022-09-19 ENCOUNTER — LAB REQUISITION (OUTPATIENT)
Dept: LAB | Facility: CLINIC | Age: 57
End: 2022-09-19

## 2022-09-19 LAB
ALBUMIN SERPL BCG-MCNC: 4.6 G/DL (ref 3.5–5.2)
ALP SERPL-CCNC: 106 U/L (ref 35–104)
ALT SERPL W P-5'-P-CCNC: 16 U/L (ref 10–35)
ANION GAP SERPL CALCULATED.3IONS-SCNC: 10 MMOL/L (ref 7–15)
AST SERPL W P-5'-P-CCNC: 19 U/L (ref 10–35)
BILIRUB SERPL-MCNC: 0.4 MG/DL
BUN SERPL-MCNC: 14.4 MG/DL (ref 6–20)
CALCIUM SERPL-MCNC: 9.8 MG/DL (ref 8.6–10)
CHLORIDE SERPL-SCNC: 105 MMOL/L (ref 98–107)
CREAT SERPL-MCNC: 0.69 MG/DL (ref 0.51–0.95)
DEPRECATED HCO3 PLAS-SCNC: 25 MMOL/L (ref 22–29)
GFR SERPL CREATININE-BSD FRML MDRD: >90 ML/MIN/1.73M2
GLUCOSE SERPL-MCNC: 105 MG/DL (ref 70–99)
POTASSIUM SERPL-SCNC: 4.3 MMOL/L (ref 3.4–5.3)
PROT SERPL-MCNC: 6.6 G/DL (ref 6.4–8.3)
SODIUM SERPL-SCNC: 140 MMOL/L (ref 136–145)

## 2022-09-19 PROCEDURE — 80053 COMPREHEN METABOLIC PANEL: CPT | Performed by: FAMILY MEDICINE

## 2022-09-19 NOTE — TELEPHONE ENCOUNTER
[facility] Call Center    Phone Message    May a detailed message be left on voicemail: yes     Reason for Call: Other: Patient calling to check in on the status of her cardiac rehab prior authorization. Please call her with an update, thanks!     Action Taken: Other: Cardiology    Travel Screening: Not Applicable

## 2022-09-20 NOTE — TELEPHONE ENCOUNTER
"Marion called after receiving a call from cardiac rehab referral team who said that the patients diagnosis \"Left Atrium excision\" does not qualify.  If this could be stated \"That pt has ongoing SOB\" Pulmonary Rehab  Or for cardiac rehab they will cover for these reasons \"CABG\" \"Valve Replacement or repair\"  \"Heart Transplant or Stent\" \"Stable Angina\" or \"STEMI/NSTEMI or Myocardial Infarct\"  "

## 2022-09-20 NOTE — TELEPHONE ENCOUNTER
"Marion called again today and asked for a nurse to call the insurance company to make sure that cardiac rehab is appropriate  The letter pt received says  \"[facility] covers this multidisciplinary program when medical conditions indicates this is appropriate\"  [facility] # [phone]   "

## 2022-09-23 ENCOUNTER — MEDICAL CORRESPONDENCE (OUTPATIENT)
Dept: CARDIOLOGY | Facility: CLINIC | Age: 57
End: 2022-09-23

## 2022-09-29 ENCOUNTER — DOCUMENTATION ONLY (OUTPATIENT)
Dept: ADMINISTRATIVE | Facility: CLINIC | Age: 57
End: 2022-09-29

## 2022-09-29 NOTE — PROGRESS NOTES
NEEL Felix had completed prior auth form for outpatient cardiac rehab requested by [facility], form was faxed along with pertinent records-to [facility] at [phone].     Received fax today from [facility] stating they have partnered with [facility] to handle untilization management of outpt/inpt rehab services and directing us to re-fax auth form to [facility] at [phone].    Re-faxed form and pertinent records to [facility].

## 2022-09-30 ENCOUNTER — TRANSCRIBE ORDERS (OUTPATIENT)
Dept: OTHER | Age: 57
End: 2022-09-30

## 2022-09-30 ENCOUNTER — DOCUMENTATION ONLY (OUTPATIENT)
Dept: ADMINISTRATIVE | Facility: CLINIC | Age: 57
End: 2022-09-30

## 2022-09-30 DIAGNOSIS — Z98.890 HISTORY OF OPEN HEART SURGERY: Primary | ICD-10-CM

## 2022-09-30 DIAGNOSIS — D15.1 ATRIAL MYXOMA: ICD-10-CM

## 2022-09-30 NOTE — PROGRESS NOTES
Received fax from [facility] on 9/28/22 directing us to fax prior auth request for cardiac rehab to [employer] [facility]--request and records were faxed to [employer] on 9/29/22.     Received fax today from [employer] [facility] that they only process requests for PT/OT/ST and to contact [facility] for assistance.    Refaxed prior auth request & records to [facility] at [phone] along with the fax from [facility] for their reference.

## 2022-10-04 ENCOUNTER — HOSPITAL ENCOUNTER (OUTPATIENT)
Dept: CARDIAC REHAB | Facility: HOSPITAL | Age: 57
Discharge: HOME | End: 2022-10-04
Attending: PHYSICIAN ASSISTANT
Payer: COMMERCIAL

## 2022-10-04 PROCEDURE — 93798 PHYS/QHP OP CAR RHAB W/ECG: CPT

## 2022-10-04 PROCEDURE — 93797 PHYS/QHP OP CAR RHAB WO ECG: CPT

## 2022-10-05 ENCOUNTER — OFFICE VISIT (OUTPATIENT)
Dept: CARDIOLOGY | Facility: CLINIC | Age: 57
End: 2022-10-05
Payer: COMMERCIAL

## 2022-10-05 VITALS
HEART RATE: 64 BPM | SYSTOLIC BLOOD PRESSURE: 108 MMHG | WEIGHT: 128 LBS | RESPIRATION RATE: 14 BRPM | BODY MASS INDEX: 24.19 KG/M2 | DIASTOLIC BLOOD PRESSURE: 58 MMHG

## 2022-10-05 DIAGNOSIS — Z98.890 STATUS POST CARDIAC SURGERY: Primary | ICD-10-CM

## 2022-10-05 DIAGNOSIS — D15.1 CARDIAC MYXOMA: ICD-10-CM

## 2022-10-05 PROCEDURE — 99024 POSTOP FOLLOW-UP VISIT: CPT | Performed by: PHYSICIAN ASSISTANT

## 2022-10-05 NOTE — PATIENT INSTRUCTIONS
- Surgically doing well. Incisions are healing well with no signs of infection.  - Hemodynamics are stable. No medication changes were needed today.  - OK to stop Eliquis ~12/1/2022 if ok with Dr Sawant -- I'll send him a message today.   - Follow up with your cardiologist as scheduled (Dr. Sawant on 10/13/2022)  - Continue Cardiac Rehab until completed if you start.  - May start driving (4 weeks post-op) if not using narcotic pain medications.  - Continue strict sternal precautions until 10/10/2022. No lifting >10bs; may gradually increase at this point (6 weeks post-op).   - No need for further follow-up with CV surgery unless concerns. Feel free to call our office with questions. [phone].

## 2022-10-05 NOTE — PROGRESS NOTES
CARDIOTHORACIC SURGERY FOLLOW-UP VISIT     Torrie Meyer   1965   [id number]      Reason for visit: Post operative clinic visit. Patient underwent left atrial myxoma removal with Dr. Loredo on 8/29/2022.    HPI: Torrie Meyer is a 57 year old year old female seen in clinic for a routine follow-up appointment after surgery. Patient has past medical history as below. Hospital course was unremarkable. Patient was discharged to home.    Patient has been doing overall well since discharge. Patient did follow-up with primary care since leaving the hospital. Reports that incision is healing well. Denies fevers, peripheral edema. Appetite is improving and patient is voiding without difficulty. Weight has been stable. Pain management at this point with occasional Tylenol. Patient has not yet been attending cardiac rehab (insurance issues) and is currently deciding if she would like to do so. Patient is on Eliquis x3 months per surgeon preference.     PAST MEDICAL HISTORY:  No past medical history on file.    PAST SURGICAL HISTORY:  Past Surgical History:   Procedure Laterality Date     CV CORONARY ANGIOGRAM N/A 8/25/2022    Procedure: Coronary Angiogram;  Surgeon: Najma Pinedo MD;  Location: [facility] CATH LAB CV     MITRAL VALVE REPAIR N/A 8/29/2022    Procedure: LEFT ATRIAL MXYOMA REMOVAL, ANESTHESIA TRANSESOPHAGEAL ECHCARDIOGRAM,BILATERAL PULMONARY VEIN ISOLATION, EPIAORTIC ULTRASOUND;  Surgeon: Jeniffer Loredo MD;  [facility] [facility]     OR LIGATION, LEFT ATRIAL APPENDAGE Left 8/29/2022    Procedure: REMOVAL LEFT ATRIAL APPENDAGE;  Surgeon: Jeniffer Loredo MD;  [facility]: [facility]       CURRENT MEDICATIONS:     Current Outpatient Medications:      acetaminophen (TYLENOL) 325 MG tablet, Take 2 tablets (650 mg) by mouth every 4 hours as needed for other, headaches or fever (For optimal non-opioid multimodal pain management to improve pain control.), Disp: 30 tablet, Rfl: 0     alendronate  (FOSAMAX) 70 MG tablet, TAKE 1 TABLET BY MOUTH 1 TIME A WEEK, Disp: , Rfl:      apixaban ANTICOAGULANT (ELIQUIS) 5 MG tablet, Take 1 tablet (5 mg) by mouth 2 times daily, Disp: 60 tablet, Rfl: 3     aspirin (ASA) 81 MG EC tablet, Take 1 tablet (81 mg) by mouth daily, Disp: 90 tablet, Rfl: 4     cyanocobalamin (VITAMIN B-12) 1000 MCG tablet, Take 1,000 mcg by mouth daily, Disp: , Rfl:      fish oil-omega-3 fatty acids 1000 MG capsule, Take 1 g by mouth daily, Disp: , Rfl:      furosemide (LASIX) 20 MG tablet, Take 1 tablet (20 mg) by mouth daily, Disp: 7 tablet, Rfl: 0     levothyroxine (SYNTHROID/LEVOTHROID) 175 MCG tablet, Take 175 mcg by mouth daily, Disp: , Rfl:      Lidocaine (LIDOCARE) 4 % Patch, Place 2 patches onto the skin every 24 hours To prevent lidocaine toxicity, patient should be patch free for 12 hrs daily. Apply 1 patch to each side of the sternal incision for 12 hours and remove for 12 hours., Disp: 20 patch, Rfl: 0     multivitamin w/minerals (THERA-VIT-M) tablet, Take 1 tablet by mouth daily, Disp: , Rfl:      oxyCODONE (ROXICODONE) 5 MG tablet, Take 1 tablet (5 mg) by mouth every 4 hours as needed for moderate to severe pain, Disp: 16 tablet, Rfl: 0     pantoprazole (PROTONIX) 40 MG EC tablet, Take 1 tablet (40 mg) by mouth daily, Disp: 30 tablet, Rfl: 0     polyethylene glycol (MIRALAX) 17 g packet, Take 17 g by mouth daily, Disp: 30 packet, Rfl: 0     potassium chloride ER (KLOR-CON M) 10 MEQ CR tablet, Take 1 tablet (10 mEq) by mouth daily, Disp: 7 tablet, Rfl: 0     traMADol (ULTRAM) 50 MG tablet, Take 1 tablet (50 mg) by mouth every 8 hours as needed for moderate to severe pain, Disp: 20 tablet, Rfl: 0     vitamin B-Complex, Take 1 tablet by mouth daily, Disp: , Rfl:      vitamin C (ASCORBIC ACID) 250 MG tablet, Take 250 mg by mouth daily, Disp: , Rfl:      Vitamin D3 (CHOLECALCIFEROL) 25 mcg (1000 units) tablet, Take 1 tablet by mouth daily, Disp: , Rfl:     ALLERGIES:    No Known  Allergies    ROS:  Gen: No fevers, weight loss/gain  CV: Denies chest pain, peripheral edema  Pulm: Denies SOB  GI/: Voiding without problems, appetite improving.     LABS:  None    IMAGING:  None    PHYSICAL EXAM:   There were no vitals taken for this visit.  General: Alert and oriented, pleasant, no acute distress.  CV:  No peripheral edema.  Pulm: Easy work of breathing on room air.   GI: Soft, non-tender, and non-distended  Incision: Chest and incisions clean dry and intact without erythema, swelling or drainage  Neuro: CNs grossly intact.      ASSESSMENT/PLAN:  Torrie Meyer is a 57 year old year old female status post left atrial myxoma removal who returns to clinic for postop visit.     - Surgically doing well. Incisions are healing well with no signs of infection.  - Hemodynamics are stable. No medication changes were needed today.  - OK to stop Eliquis ~12/1/2022 if ok with Dr Sawant -- I'll send him a message today.   - Follow up with your cardiologist as scheduled (Dr. Sawant on 10/13/2022)  - Continue Cardiac Rehab until completed if you start.  - May start driving (4 weeks post-op) if not using narcotic pain medications.  - Continue strict sternal precautions until 10/10/2022. No lifting >10bs; may gradually increase at this point (6 weeks post-op).   - No need for further follow-up with CV surgery unless concerns. Feel free to call our office with questions. [phone].        _______  Daphne Robbins PA-C  Cardiothoracic Surgery  [phone]

## 2022-10-05 NOTE — LETTER
10/5/2022    Frank Hair MD  [address]  [facility] [address]    RE: Torrie Meyer       Dear Colleague,     I had the pleasure of seeing Torrie Meyer in the [facility].  CARDIOTHORACIC SURGERY FOLLOW-UP VISIT     Torrie Meyer   1965   [id number]      Reason for visit: Post operative clinic visit. Patient underwent left atrial myxoma removal with Dr. Loredo on 8/29/2022.    HPI: Torrie Meyer is a 57 year old year old female seen in clinic for a routine follow-up appointment after surgery. Patient has past medical history as below. Hospital course was unremarkable. Patient was discharged to home.    Patient has been doing overall well since discharge. Patient did follow-up with primary care since leaving the hospital. Reports that incision is healing well. Denies fevers, peripheral edema. Appetite is improving and patient is voiding without difficulty. Weight has been stable. Pain management at this point with occasional Tylenol. Patient has not yet been attending cardiac rehab (insurance issues) and is currently deciding if she would like to do so. Patient is on Eliquis x3 months per surgeon preference.     PAST MEDICAL HISTORY:  No past medical history on file.    PAST SURGICAL HISTORY:  Past Surgical History:   Procedure Laterality Date     CV CORONARY ANGIOGRAM N/A 8/25/2022    Procedure: Coronary Angiogram;  Surgeon: Najma Pinedo MD;  Location: [facility] CATH LAB CV     MITRAL VALVE REPAIR N/A 8/29/2022    Procedure: LEFT ATRIAL MXYOMA REMOVAL, ANESTHESIA TRANSESOPHAGEAL ECHCARDIOGRAM,BILATERAL PULMONARY VEIN ISOLATION, EPIAORTIC ULTRASOUND;  Surgeon: Jeniffer Loredo MD;  Location: [facility]     OR LIGATION, LEFT ATRIAL APPENDAGE Left 8/29/2022    Procedure: REMOVAL LEFT ATRIAL APPENDAGE;  Surgeon: Jeniffer Loredo MD;  Location: [facility]       CURRENT MEDICATIONS:     Current Outpatient Medications:      acetaminophen (TYLENOL) 325 MG  tablet, Take 2 tablets (650 mg) by mouth every 4 hours as needed for other, headaches or fever (For optimal non-opioid multimodal pain management to improve pain control.), Disp: 30 tablet, Rfl: 0     alendronate (FOSAMAX) 70 MG tablet, TAKE 1 TABLET BY MOUTH 1 TIME A WEEK, Disp: , Rfl:      apixaban ANTICOAGULANT (ELIQUIS) 5 MG tablet, Take 1 tablet (5 mg) by mouth 2 times daily, Disp: 60 tablet, Rfl: 3     aspirin (ASA) 81 MG EC tablet, Take 1 tablet (81 mg) by mouth daily, Disp: 90 tablet, Rfl: 4     cyanocobalamin (VITAMIN B-12) 1000 MCG tablet, Take 1,000 mcg by mouth daily, Disp: , Rfl:      fish oil-omega-3 fatty acids 1000 MG capsule, Take 1 g by mouth daily, Disp: , Rfl:      furosemide (LASIX) 20 MG tablet, Take 1 tablet (20 mg) by mouth daily, Disp: 7 tablet, Rfl: 0     levothyroxine (SYNTHROID/LEVOTHROID) 175 MCG tablet, Take 175 mcg by mouth daily, Disp: , Rfl:      Lidocaine (LIDOCARE) 4 % Patch, Place 2 patches onto the skin every 24 hours To prevent lidocaine toxicity, patient should be patch free for 12 hrs daily. Apply 1 patch to each side of the sternal incision for 12 hours and remove for 12 hours., Disp: 20 patch, Rfl: 0     multivitamin w/minerals (THERA-VIT-M) tablet, Take 1 tablet by mouth daily, Disp: , Rfl:      oxyCODONE (ROXICODONE) 5 MG tablet, Take 1 tablet (5 mg) by mouth every 4 hours as needed for moderate to severe pain, Disp: 16 tablet, Rfl: 0     pantoprazole (PROTONIX) 40 MG EC tablet, Take 1 tablet (40 mg) by mouth daily, Disp: 30 tablet, Rfl: 0     polyethylene glycol (MIRALAX) 17 g packet, Take 17 g by mouth daily, Disp: 30 packet, Rfl: 0     potassium chloride ER (KLOR-CON M) 10 MEQ CR tablet, Take 1 tablet (10 mEq) by mouth daily, Disp: 7 tablet, Rfl: 0     traMADol (ULTRAM) 50 MG tablet, Take 1 tablet (50 mg) by mouth every 8 hours as needed for moderate to severe pain, Disp: 20 tablet, Rfl: 0     vitamin B-Complex, Take 1 tablet by mouth daily, Disp: , Rfl:      vitamin C  (ASCORBIC ACID) 250 MG tablet, Take 250 mg by mouth daily, Disp: , Rfl:      Vitamin D3 (CHOLECALCIFEROL) 25 mcg (1000 units) tablet, Take 1 tablet by mouth daily, Disp: , Rfl:     ALLERGIES:    No Known Allergies    ROS:  Gen: No fevers, weight loss/gain  CV: Denies chest pain, peripheral edema  Pulm: Denies SOB  GI/: Voiding without problems, appetite improving.     LABS:  None    IMAGING:  None    PHYSICAL EXAM:   There were no vitals taken for this visit.  General: Alert and oriented, pleasant, no acute distress.  CV:  No peripheral edema.  Pulm: Easy work of breathing on room air.   GI: Soft, non-tender, and non-distended  Incision: Chest and incisions clean dry and intact without erythema, swelling or drainage  Neuro: CNs grossly intact.      ASSESSMENT/PLAN:  Torrie Meyer is a 57 year old year old female status post left atrial myxoma removal who returns to clinic for postop visit.     - Surgically doing well. Incisions are healing well with no signs of infection.  - Hemodynamics are stable. No medication changes were needed today.  - OK to stop Eliquis ~12/1/2022 if ok with Dr Sawant -- I'll send him a message today.   - Follow up with your cardiologist as scheduled (Dr. Sawant on 10/13/2022)  - Continue Cardiac Rehab until completed if you start.  - May start driving (4 weeks post-op) if not using narcotic pain medications.  - Continue strict sternal precautions until 10/10/2022. No lifting >10bs; may gradually increase at this point (6 weeks post-op).   - No need for further follow-up with CV surgery unless concerns. Feel free to call our office with questions. [phone].        _______  Daphne Robbins PA-C  Cardiothoracic Surgery  [phone]    Thank you for allowing me to participate in the care of your patient.      Sincerely,     Daphne Robbins PA-C     [facility]  cc:   No referring provider defined for this encounter.

## 2022-10-06 ENCOUNTER — MEDICAL CORRESPONDENCE (OUTPATIENT)
Dept: CARDIAC REHAB | Facility: HOSPITAL | Age: 57
End: 2022-10-06

## 2022-10-13 ENCOUNTER — OFFICE VISIT (OUTPATIENT)
Dept: CARDIOLOGY | Facility: CLINIC | Age: 57
End: 2022-10-13
Payer: COMMERCIAL

## 2022-10-13 VITALS
RESPIRATION RATE: 16 BRPM | SYSTOLIC BLOOD PRESSURE: 120 MMHG | WEIGHT: 129 LBS | HEART RATE: 83 BPM | BODY MASS INDEX: 24.37 KG/M2 | DIASTOLIC BLOOD PRESSURE: 66 MMHG

## 2022-10-13 DIAGNOSIS — D15.1 ATRIAL MYXOMA: ICD-10-CM

## 2022-10-13 DIAGNOSIS — I48.0 PAROXYSMAL ATRIAL FIBRILLATION (H): Primary | ICD-10-CM

## 2022-10-13 DIAGNOSIS — E03.9 ACQUIRED HYPOTHYROIDISM: ICD-10-CM

## 2022-10-13 DIAGNOSIS — I25.5 ISCHEMIC CARDIOMYOPATHY: ICD-10-CM

## 2022-10-13 LAB
ATRIAL RATE - MUSE: 82 BPM
DIASTOLIC BLOOD PRESSURE - MUSE: NORMAL MMHG
INTERPRETATION ECG - MUSE: NORMAL
P AXIS - MUSE: NORMAL DEGREES
PR INTERVAL - MUSE: 144 MS
QRS DURATION - MUSE: 80 MS
QT - MUSE: 390 MS
QTC - MUSE: 455 MS
R AXIS - MUSE: 69 DEGREES
SYSTOLIC BLOOD PRESSURE - MUSE: NORMAL MMHG
T AXIS - MUSE: 74 DEGREES
VENTRICULAR RATE- MUSE: 82 BPM

## 2022-10-13 PROCEDURE — 93000 ELECTROCARDIOGRAM COMPLETE: CPT | Performed by: INTERNAL MEDICINE

## 2022-10-13 PROCEDURE — 99215 OFFICE O/P EST HI 40 MIN: CPT | Mod: 24 | Performed by: INTERNAL MEDICINE

## 2022-10-13 RX ORDER — HYDROCORTISONE 25 MG/G
CREAM TOPICAL PRN
COMMUNITY
Start: 2022-09-22

## 2022-10-13 NOTE — PROGRESS NOTES
Click to link to [facility] HEART CARE NOTE       Assessment/Plan:   1.  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  Encouraged her to do cardiac exercise.  Most likely she will have her own exercise.  She recovered from her surgery very well.  She has no symptoms.  Periodically monitor recurrence of for myxoma.    2.  Paroxysmal atrial fibrillation, s/p PVI and HEATHER ligation on August 29, 2022: The patient's atrial fibrillation was converted to sinus rhythm which was demonstrated by ECG in clinic today.  She is not on AV robbie blockade agents.  Currently she is on Eliquis 5 mg twice a day.  We discussed further evaluation and management.  CT to evaluate left atrial appendage ligation and thrombosis.  If left atrial appendage is ligated well, no thrombus, Eliquis will be discontinued.    3.  Mild cardiomyopathy LVEF of 45%: We will repeat echocardiogram in 6 months.  Currently she has no signs of congestive heart failure.  No symptoms.  Give the time of LV function recover from atrial fibrillation and surgery.    4.  Hypothyroidism: Continue levothyroxine, follow-up with Dr. Hair.    Total 43 minutes were spent in this visit for face to face visit, physical exam, review of current labs/imaging studies, plan for ongoing treatment with >50% spent on counseling and coordination of care as documented in the above mentioned note.      Thank you for the opportunity to be involved in the care of Torrie Meyer. If you have any questions, please feel free to contact me.  I will see the patient again in 6 months and as needed    Much or all of the text in this note was generated through the use of Dragon Dictate voice-to-text software. Errors in spelling or words which seem out of context are unintentional.   Sound alike errors, in particular, may have escaped editing.       History of Present Illness:   It is my pleasure to  see Torrie Meyer at the [facility] clinic for routine cardiology follow-up post hospital discharge. Torrie Meyer is a 57 year old female with a medical history of hypothyroidism, paroxysmal atrial fibrillation status post PVI and HEATHER ligation on August 29, 2022, left atrial myxoma status post surgical resection on October 29, 2022, mild cardiomyopathy LVEF 45%.    The patient states that she has been recovered well from her heart surgery.  She had no chest pain, shortness of breath, palpitations, dizziness, orthopnea, PND or leg edema.  Her chest wound is healed well.  Her blood pressure and heart rate are controlled.  She did not do cardiac rehab due to medical cause.  She has been doing exercise by herself.    Her ECG in clinic today demonstrate sinus arrhythmia with occasional PVC, nonspecific T wave abnormality.    Past Medical History:     Patient Active Problem List   Diagnosis     Atrial fibrillation with slow ventricular response (H)     Acquired hypothyroidism     Atrial myxoma     Sinus bradycardia     Hypomagnesemia     Short of breath on exertion     Left atrial mass       Past Surgical History:     Past Surgical History:   Procedure Laterality Date     CV CORONARY ANGIOGRAM N/A 8/25/2022    Procedure: Coronary Angiogram;  Surgeon: Najma Pinedo MD;  Location: [facility] CATH LAB CV     MITRAL VALVE REPAIR N/A 8/29/2022    Procedure: LEFT ATRIAL MXYOMA REMOVAL, ANESTHESIA TRANSESOPHAGEAL ECHCARDIOGRAM,BILATERAL PULMONARY VEIN ISOLATION, EPIAORTIC ULTRASOUND;  Surgeon: Jeniffer Loredo MD;  Location: [facility]     OR LIGATION, LEFT ATRIAL APPENDAGE Left 8/29/2022    Procedure: REMOVAL LEFT ATRIAL APPENDAGE;  Surgeon: Jeniffer Loredo MD;  Location: [facility]       Family History:     Family History   Problem Relation Age of Onset     Cancer Mother         stomach     Cancer Father         prostate     No Known Problems Sister      No Known Problems Daughter      No  Known Problems Maternal Grandmother      No Known Problems Maternal Grandfather      No Known Problems Paternal Grandmother      No Known Problems Paternal Grandfather      No Known Problems Maternal Aunt      No Known Problems Paternal Aunt      Hereditary Breast and Ovarian Cancer Syndrome No family hx of      Breast Cancer No family hx of      Colon Cancer No family hx of      Endometrial Cancer No family hx of      Ovarian Cancer No family hx of        Social History:    reports that she has never smoked. She has never used smokeless tobacco.    Review of Systems:   12 systems are reviewed negative except for in HPI.    Meds:     Current Outpatient Medications:      acetaminophen (TYLENOL) 325 MG tablet, Take 2 tablets (650 mg) by mouth every 4 hours as needed for other, headaches or fever (For optimal non-opioid multimodal pain management to improve pain control.), Disp: 30 tablet, Rfl: 0     alendronate (FOSAMAX) 70 MG tablet, TAKE 1 TABLET BY MOUTH 1 TIME A WEEK, Disp: , Rfl:      apixaban ANTICOAGULANT (ELIQUIS) 5 MG tablet, Take 1 tablet (5 mg) by mouth 2 times daily, Disp: 60 tablet, Rfl: 3     aspirin (ASA) 81 MG EC tablet, Take 1 tablet (81 mg) by mouth daily, Disp: 90 tablet, Rfl: 4     cyanocobalamin (VITAMIN B-12) 1000 MCG tablet, Take 1,000 mcg by mouth daily, Disp: , Rfl:      fish oil-omega-3 fatty acids 1000 MG capsule, Take 1 g by mouth daily, Disp: , Rfl:      levothyroxine (SYNTHROID/LEVOTHROID) 175 MCG tablet, Take 175 mcg by mouth daily, Disp: , Rfl:      Lidocaine (LIDOCARE) 4 % Patch, Place 2 patches onto the skin every 24 hours To prevent lidocaine toxicity, patient should be patch free for 12 hrs daily. Apply 1 patch to each side of the sternal incision for 12 hours and remove for 12 hours., Disp: 20 patch, Rfl: 0     multivitamin w/minerals (THERA-VIT-M) tablet, Take 1 tablet by mouth daily, Disp: , Rfl:      vitamin B-Complex, Take 1 tablet by mouth daily, Disp: , Rfl:      vitamin C  (ASCORBIC ACID) 250 MG tablet, Take 250 mg by mouth daily, Disp: , Rfl:      Vitamin D3 (CHOLECALCIFEROL) 25 mcg (1000 units) tablet, Take 1 tablet by mouth daily, Disp: , Rfl:      hydrocortisone, Perianal, (ANUSOL-HC) 2.5 % cream, APPLY 1 GRAM TOPICALLY TO THE AFFECTED AREA TWICE DAILY (Patient not taking: Reported on 10/13/2022), Disp: , Rfl:      pantoprazole (PROTONIX) 40 MG EC tablet, Take 1 tablet (40 mg) by mouth daily (Patient not taking: Reported on 10/13/2022), Disp: 30 tablet, Rfl: 0     Allergies:   Patient has no known allergies.    Objective:      Physical Exam  58.5 kg (129 lb)     Body mass index is 24.37 kg/m .  /66 (BP Location: Left arm, Patient Position: Sitting, Cuff Size: Adult Regular)   Pulse 83   Resp 16   Wt 58.5 kg (129 lb)   BMI 24.37 kg/m      General Appearance:   Awake, Alert, No acute distress.   HEENT:  Pupil equal, reactive to light. No scleral icterus; the mucous membranes were moist. No oral ulcers or thrush.    Neck: No cervical bruits. No JVD. No thyromegaly. No lymph node enlargement or tenderness.   Chest: The spine was straight. The chest was symmetric.   Lungs:   Respirations unlabored. Lungs are clear to auscultation. No crackles. No wheezing.   Cardiovascular:   Regular, bradycardiac, normal first and second heart sounds with no murmurs. No rubs or gallops.    Abdomen:  Soft. No tenderness. Non-distended. Bowels sounds are present   Extremities: Equal posterior tibial pulses. No leg edema.   Skin: No rashes or ulcers. Warm, Dry.   Musculoskeletal: No tenderness. No deformity.   Neurologic: Mood and affect are appropriate. No focal deficits.         EKG:  Personally reivewed  Sinus rhythm of with occasional PVC  When compared to previous ECG  Sinus rhythm has replaced atrial fibrillation.    MARII on August 24, 2022:  1. The left ventricle is normal in size. Left ventricular systolic performance  is mild to moderately reduced. The ejection fraction is estimated to  "be 40-45%.  2. There is mild to moderate global reduction in left ventricular systolic performance.  3. There is a 3 x 4 x 4.5 cm mass in the left atrium attached to the atrial  septum. The echocardiographic features and anatomic location are paradigmatic  of an atrial myxoma. The mass moves forward in diastole abutting the mitral  valve leaflets and extends through the mitral annular valve plane.  4. Mild right ventricular enlargement with low normal right ventricular systolic performance.  5. There is moderate left atrial enlargement. There is mild to moderate right atrial enlargement.  6. No thrombus is detected within the left atrial appendage.  7. Color Doppler interrogation the atrial septum suggest the presence of a  small patent foramen ovale (PFO). No right to left shunting, however, is  detected with echo contrast (\"bubble\") study.    Coronary angiogram on August 25, 2022:  Normal coronary arteries    Lab Review   Lab Results   Component Value Date     08/17/2022    CO2 28 08/17/2022    CO2 27 04/12/2022    BUN 18.3 08/17/2022    BUN 19 04/12/2022     No results found for: WBC, HGB, HCT, MCV, PLT  Lab Results   Component Value Date    CHOL 151 07/23/2020    TRIG 50 07/23/2020    HDL 58 07/23/2020    LDL 83 07/23/2020     LDL Cholesterol Calculated   Date Value Ref Range Status   08/23/2022 112 <=129 mg/dL Final     No results found for: TROPONINI  No results found for: BNP  Lab Results   Component Value Date    TSH 78.80 08/17/2022    TSH 0.03 04/27/2022               "

## 2022-10-13 NOTE — LETTER
10/13/2022    Frank Hair MD  [address]  [facility] [address]    RE: Torrie Meyer       Dear Colleague,     I had the pleasure of seeing Torrie Meyer in the [facility].      Click to link to [facility] HEART CARE NOTE       Assessment/Plan:   1.  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  Encouraged her to do cardiac exercise.  Most likely she will have her own exercise.  She recovered from her surgery very well.  She has no symptoms.  Periodically monitor recurrence of for myxoma.    2.  Paroxysmal atrial fibrillation, s/p PVI and HEATHER ligation on August 29, 2022: The patient's atrial fibrillation was converted to sinus rhythm which was demonstrated by ECG in clinic today.  She is not on AV robbie blockade agents.  Currently she is on Eliquis 5 mg twice a day.  We discussed further evaluation and management.  CT to evaluate left atrial appendage ligation and thrombosis.  If left atrial appendage is ligated well, no thrombus, Eliquis will be discontinued.    3.  Mild cardiomyopathy LVEF of 45%: We will repeat echocardiogram in 6 months.  Currently she has no signs of congestive heart failure.  No symptoms.  Give the time of LV function recover from atrial fibrillation and surgery.    4.  Hypothyroidism: Continue levothyroxine, follow-up with Dr. Hair.    Total 43 minutes were spent in this visit for face to face visit, physical exam, review of current labs/imaging studies, plan for ongoing treatment with >50% spent on counseling and coordination of care as documented in the above mentioned note.      Thank you for the opportunity to be involved in the care of Torrie Meyer. If you have any questions, please feel free to contact me.  I will see the patient again in 6 months and as needed    Much or all of the text in this note was generated through the use of Dragon Dictate  voice-to-text software. Errors in spelling or words which seem out of context are unintentional.   Sound alike errors, in particular, may have escaped editing.       History of Present Illness:   It is my pleasure to see Torrie Meyer at the [facility] clinic for routine cardiology follow-up post hospital discharge. Torrie Meyer is a 57 year old female with a medical history of hypothyroidism, paroxysmal atrial fibrillation status post PVI and HEATHER ligation on August 29, 2022, left atrial myxoma status post surgical resection on October 29, 2022, mild cardiomyopathy LVEF 45%.    The patient states that she has been recovered well from her heart surgery.  She had no chest pain, shortness of breath, palpitations, dizziness, orthopnea, PND or leg edema.  Her chest wound is healed well.  Her blood pressure and heart rate are controlled.  She did not do cardiac rehab due to medical cause.  She has been doing exercise by herself.    Her ECG in clinic today demonstrate sinus arrhythmia with occasional PVC, nonspecific T wave abnormality.    Past Medical History:     Patient Active Problem List   Diagnosis     Atrial fibrillation with slow ventricular response (H)     Acquired hypothyroidism     Atrial myxoma     Sinus bradycardia     Hypomagnesemia     Short of breath on exertion     Left atrial mass       Past Surgical History:     Past Surgical History:   Procedure Laterality Date     CV CORONARY ANGIOGRAM N/A 8/25/2022    Procedure: Coronary Angiogram;  Surgeon: Najma Pinedo MD;  Location: [facility] CATH LAB CV     MITRAL VALVE REPAIR N/A 8/29/2022    Procedure: LEFT ATRIAL MXYOMA REMOVAL, ANESTHESIA TRANSESOPHAGEAL ECHCARDIOGRAM,BILATERAL PULMONARY VEIN ISOLATION, EPIAORTIC ULTRASOUND;  Surgeon: Jeniffer Loredo MD;  Location: [facility] Main OR     OR LIGATION, LEFT ATRIAL APPENDAGE Left 8/29/2022    Procedure: REMOVAL LEFT ATRIAL APPENDAGE;  Surgeon: Jeniffer Loredo MD;  Location: [facility]  Main OR       Family History:     Family History   Problem Relation Age of Onset     Cancer Mother         stomach     Cancer Father         prostate     No Known Problems Sister      No Known Problems Daughter      No Known Problems Maternal Grandmother      No Known Problems Maternal Grandfather      No Known Problems Paternal Grandmother      No Known Problems Paternal Grandfather      No Known Problems Maternal Aunt      No Known Problems Paternal Aunt      Hereditary Breast and Ovarian Cancer Syndrome No family hx of      Breast Cancer No family hx of      Colon Cancer No family hx of      Endometrial Cancer No family hx of      Ovarian Cancer No family hx of        Social History:    reports that she has never smoked. She has never used smokeless tobacco.    Review of Systems:   12 systems are reviewed negative except for in HPI.    Meds:     Current Outpatient Medications:      acetaminophen (TYLENOL) 325 MG tablet, Take 2 tablets (650 mg) by mouth every 4 hours as needed for other, headaches or fever (For optimal non-opioid multimodal pain management to improve pain control.), Disp: 30 tablet, Rfl: 0     alendronate (FOSAMAX) 70 MG tablet, TAKE 1 TABLET BY MOUTH 1 TIME A WEEK, Disp: , Rfl:      apixaban ANTICOAGULANT (ELIQUIS) 5 MG tablet, Take 1 tablet (5 mg) by mouth 2 times daily, Disp: 60 tablet, Rfl: 3     aspirin (ASA) 81 MG EC tablet, Take 1 tablet (81 mg) by mouth daily, Disp: 90 tablet, Rfl: 4     cyanocobalamin (VITAMIN B-12) 1000 MCG tablet, Take 1,000 mcg by mouth daily, Disp: , Rfl:      fish oil-omega-3 fatty acids 1000 MG capsule, Take 1 g by mouth daily, Disp: , Rfl:      levothyroxine (SYNTHROID/LEVOTHROID) 175 MCG tablet, Take 175 mcg by mouth daily, Disp: , Rfl:      Lidocaine (LIDOCARE) 4 % Patch, Place 2 patches onto the skin every 24 hours To prevent lidocaine toxicity, patient should be patch free for 12 hrs daily. Apply 1 patch to each side of the sternal incision for 12 hours and  remove for 12 hours., Disp: 20 patch, Rfl: 0     multivitamin w/minerals (THERA-VIT-M) tablet, Take 1 tablet by mouth daily, Disp: , Rfl:      vitamin B-Complex, Take 1 tablet by mouth daily, Disp: , Rfl:      vitamin C (ASCORBIC ACID) 250 MG tablet, Take 250 mg by mouth daily, Disp: , Rfl:      Vitamin D3 (CHOLECALCIFEROL) 25 mcg (1000 units) tablet, Take 1 tablet by mouth daily, Disp: , Rfl:      hydrocortisone, Perianal, (ANUSOL-HC) 2.5 % cream, APPLY 1 GRAM TOPICALLY TO THE AFFECTED AREA TWICE DAILY (Patient not taking: Reported on 10/13/2022), Disp: , Rfl:      pantoprazole (PROTONIX) 40 MG EC tablet, Take 1 tablet (40 mg) by mouth daily (Patient not taking: Reported on 10/13/2022), Disp: 30 tablet, Rfl: 0     Allergies:   Patient has no known allergies.    Objective:      Physical Exam  58.5 kg (129 lb)     Body mass index is 24.37 kg/m .  /66 (BP Location: Left arm, Patient Position: Sitting, Cuff Size: Adult Regular)   Pulse 83   Resp 16   Wt 58.5 kg (129 lb)   BMI 24.37 kg/m      General Appearance:   Awake, Alert, No acute distress.   HEENT:  Pupil equal, reactive to light. No scleral icterus; the mucous membranes were moist. No oral ulcers or thrush.    Neck: No cervical bruits. No JVD. No thyromegaly. No lymph node enlargement or tenderness.   Chest: The spine was straight. The chest was symmetric.   Lungs:   Respirations unlabored. Lungs are clear to auscultation. No crackles. No wheezing.   Cardiovascular:   Regular, bradycardiac, normal first and second heart sounds with no murmurs. No rubs or gallops.    Abdomen:  Soft. No tenderness. Non-distended. Bowels sounds are present   Extremities: Equal posterior tibial pulses. No leg edema.   Skin: No rashes or ulcers. Warm, Dry.   Musculoskeletal: No tenderness. No deformity.   Neurologic: Mood and affect are appropriate. No focal deficits.         EKG:  Personally reivewed  Sinus rhythm of with occasional PVC  When compared to previous  "ECG  Sinus rhythm has replaced atrial fibrillation.    MARII on August 24, 2022:  1. The left ventricle is normal in size. Left ventricular systolic performance  is mild to moderately reduced. The ejection fraction is estimated to be 40-45%.  2. There is mild to moderate global reduction in left ventricular systolic performance.  3. There is a 3 x 4 x 4.5 cm mass in the left atrium attached to the atrial  septum. The echocardiographic features and anatomic location are paradigmatic  of an atrial myxoma. The mass moves forward in diastole abutting the mitral  valve leaflets and extends through the mitral annular valve plane.  4. Mild right ventricular enlargement with low normal right ventricular systolic performance.  5. There is moderate left atrial enlargement. There is mild to moderate right atrial enlargement.  6. No thrombus is detected within the left atrial appendage.  7. Color Doppler interrogation the atrial septum suggest the presence of a  small patent foramen ovale (PFO). No right to left shunting, however, is  detected with echo contrast (\"bubble\") study.    Coronary angiogram on August 25, 2022:  Normal coronary arteries    Lab Review   Lab Results   Component Value Date     08/17/2022    CO2 28 08/17/2022    CO2 27 04/12/2022    BUN 18.3 08/17/2022    BUN 19 04/12/2022     No results found for: WBC, HGB, HCT, MCV, PLT  Lab Results   Component Value Date    CHOL 151 07/23/2020    TRIG 50 07/23/2020    HDL 58 07/23/2020    LDL 83 07/23/2020     LDL Cholesterol Calculated   Date Value Ref Range Status   08/23/2022 112 <=129 mg/dL Final     No results found for: TROPONINI  No results found for: BNP  Lab Results   Component Value Date    TSH 78.80 08/17/2022    TSH 0.03 04/27/2022                   Thank you for allowing me to participate in the care of your patient.      Sincerely,     Anahi Sawant MD     [facility]  cc:   No referring provider " defined for this encounter.

## 2022-10-19 ENCOUNTER — TELEPHONE (OUTPATIENT)
Dept: CARDIOLOGY | Facility: CLINIC | Age: 57
End: 2022-10-19

## 2022-10-19 ENCOUNTER — NURSE TRIAGE (OUTPATIENT)
Dept: CARDIOLOGY | Facility: CLINIC | Age: 57
End: 2022-10-19

## 2022-10-19 NOTE — TELEPHONE ENCOUNTER
Marion called because she woke up at 3 AM with numbness and tingling in her left arm. She had open heart surgery 8/29/22 and has not done cardiac rehab. Yesterday she walked with 1 1/2 pound arm weights and she doesn't know if this caused the numbness and tingling or not. Her right arm is fine. Arm is not red or swollen. She saw surgery PA on 10/5/22 and they referred her back to her primary Cardiologist. I told her I will send a message to them and ask them to call her and advise her.   After I read the 10/5/22 OV note from Daphne Robbins PA-C, I called Torrie back and gave her the number that was listed in her OV instructions to call with questions. She said she would do that.   Reason for Disposition    Arm pain    Additional Information    Negative: Shock suspected (e.g., cold/pale/clammy skin, too weak to stand, low BP, rapid pulse)    Negative: Similar pain previously and it was from 'heart attack'    Negative: Similar pain previously from 'angina' and not relieved by nitroglycerin    Negative: Sounds like a life-threatening emergency to the triager    Negative: Followed an injury to arm    Negative: Chest pain    Negative: Wound looks infected    Negative: Elbow pain is main symptom    Negative: Hand or wrist pain is main symptom    Negative: Difficulty breathing or unusual sweating (e.g., sweating without exertion)    Negative: Chest pain lasting longer than 5 minutes    Negative: Age > 40 and no obvious cause for pain, pain still present even when not moving the arm    Negative: Fever and red area (or area very tender to touch)    Negative: Swollen joint and fever    Negative: Entire arm is swollen    Negative: Patient sounds very sick or weak to the triager    Negative: SEVERE pain (e.g., excruciating, unable to do any normal activities)    Negative: Red area or streak > 2 inches (or 5 cm)    Negative: Cast on wrist or arm and now increasing pain    Negative: Weakness (i.e., loss of strength) in hand or  "fingers    Negative: Arm pains with exertion (e.g., occurs with walking; goes away on resting)    Negative: Painful rash with multiple small blisters grouped together (i.e., dermatomal distribution or 'band' or 'stripe')    Negative: Looks like a boil, infected sore, deep ulcer, or other infected rash (spreading redness, pus)    Negative: Localized rash is very painful (no fever)    Negative: Numbness (i.e., loss of sensation) in hand or fingers    Negative: Localized pain, redness or hard lump along vein    Negative: Patient wants to be seen    Negative: MODERATE pain (e.g., interferes with normal activities) and present > 3 days    Negative: Pain is worsened or caused by bending the neck    Negative: MILD pain (e.g., does not interfere with normal activities) and present > 7 days    Negative: Arm pain is a chronic symptom (recurrent or ongoing AND lasting > 4 weeks)    Negative: Caused by strained muscle    Negative: Caused by overuse from recent vigorous activity (e.g., sports, lifting, physical work)    Negative: Caused by phantom arm pain    Answer Assessment - Initial Assessment Questions  1. ONSET: \"When did the pain start?\"      Last night woke up at 3 AM with it  2. LOCATION: \"Where is the pain located?\"     Left lower arm  3. PAIN: \"How bad is the pain?\" (Scale 1-10; or mild, moderate, severe)    - MILD (1-3): doesn't interfere with normal activities    - MODERATE (4-7): interferes with normal activities (e.g., work or school) or awakens from sleep    - SEVERE (8-10): excruciating pain, unable to do any normal activities, unable to hold a cup of water     Not having pain- numbness and tingling  4. WORK OR EXERCISE: \"Has there been any recent work or exercise that involved this part of the body?\"     Yesterday she walked using 1 1/2 pound weights on her arms  5. CAUSE: \"What do you think is causing the arm pain?\"     Doesn't know for sure  6. OTHER SYMPTOMS: \"Do you have any other symptoms?\" (e.g., neck " pain, swelling, rash, fever, numbness, weakness)    Numbness, weakness in left lower arm only    Protocols used: ARM PAIN-A-OH

## 2022-10-19 NOTE — TELEPHONE ENCOUNTER
October 19, 2022  Melania Frias RN     DD    11:16 AM  Note  Patient called regarding some new tingling/numbness/pain in her lower left arm that started when she was sleeping last night. The pain does not radiate up into her shoulder or chest. She does not have any weakness associated with the numbness. She does not have any other symptoms of chest pain, shortness of breath, heartburn, or diaphoresis. Encouraged patient to reach out to PCP if this persists.

## 2022-10-19 NOTE — TELEPHONE ENCOUNTER
Patient called regarding some new tingling/numbness/pain in her lower left arm that started when she was sleeping last night. The pain does not radiate up into her shoulder or chest. She does not have any weakness associated with the numbness. She does not have any other symptoms of chest pain, shortness of breath, heartburn, or diaphoresis. Encouraged patient to reach out to PCP if this persists.

## 2022-10-19 NOTE — TELEPHONE ENCOUNTER
Noted- see encounter with Melania Frias RN from today. Addressed symptoms. No further interventions needed. -NOEMI

## 2022-10-22 ENCOUNTER — HEALTH MAINTENANCE LETTER (OUTPATIENT)
Age: 57
End: 2022-10-22

## 2022-10-28 ENCOUNTER — HOSPITAL ENCOUNTER (OUTPATIENT)
Dept: CT IMAGING | Facility: CLINIC | Age: 57
Discharge: HOME | End: 2022-10-28
Attending: INTERNAL MEDICINE | Admitting: INTERNAL MEDICINE
Payer: COMMERCIAL

## 2022-10-28 DIAGNOSIS — I48.0 PAROXYSMAL ATRIAL FIBRILLATION (H): ICD-10-CM

## 2022-10-28 LAB
CREAT BLD-MCNC: 0.6 MG/DL (ref 0.6–1.1)
GFR SERPL CREATININE-BSD FRML MDRD: >60 ML/MIN/1.73M2

## 2022-10-28 PROCEDURE — 75572 CT HRT W/3D IMAGE: CPT | Mod: 26 | Performed by: GENERAL ACUTE CARE HOSPITAL

## 2022-10-28 PROCEDURE — 75572 CT HRT W/3D IMAGE: CPT

## 2022-10-28 PROCEDURE — 250N000011 HC RX IP 250 OP 636: Performed by: INTERNAL MEDICINE

## 2022-10-28 PROCEDURE — 82565 ASSAY OF CREATININE: CPT

## 2022-10-28 RX ORDER — IOPAMIDOL 755 MG/ML
120 INJECTION, SOLUTION INTRAVASCULAR ONCE
Status: COMPLETED | OUTPATIENT
Start: 2022-10-28 | End: 2022-10-28

## 2022-10-28 RX ADMIN — IOPAMIDOL 120 ML: 755 INJECTION, SOLUTION INTRAVENOUS at 17:31

## 2022-10-31 LAB
BSA FOR ECHO PROCEDURE: 1.59 M2
CCTA ASCENDING AORTA: 3
CCTA SINUS: 3.6

## 2022-11-08 ENCOUNTER — TELEPHONE (OUTPATIENT)
Dept: CARDIOLOGY | Facility: CLINIC | Age: 57
End: 2022-11-08

## 2022-11-08 NOTE — TELEPHONE ENCOUNTER
Spoke with pcp regarding ct angio results. Had faxed over results but had not viewed them at this time. Read impression to provider. No further questions or concerns noted. -NOEMI

## 2022-12-10 NOTE — PLAN OF CARE
Problem: Plan of Care - These are the overarching goals to be used throughout the patient stay.    Goal: Plan of Care Review/Shift Note  Description: The Plan of Care Review/Shift note should be completed every shift.  The Outcome Evaluation is a brief statement about your assessment that the patient is improving, declining, or no change.  This information will be displayed automatically on your shift note.  Outcome: Ongoing, Progressing  Flowsheets (Taken 9/3/2022 0503)  Plan of Care Reviewed With: patient  Overall Patient Progress: improving   Goal Outcome Evaluation:    Plan of Care Reviewed With: patient     Overall Patient Progress: improving         Vitals:    09/02/22 1630 09/02/22 2026 09/02/22 2359 09/03/22 0345   BP: 113/73 109/74 108/82 117/88   BP Location: Left arm Left arm Left arm Right arm   Pulse: 98 94 85 97   Resp: 18 17 18 18   Temp: 99  F (37.2  C) 98.3  F (36.8  C) 98.5  F (36.9  C) 98.3  F (36.8  C)   TempSrc: Oral Oral Oral Oral   SpO2: 96% 96% 96% 97%   Weight:    63.3 kg (139 lb 9.6 oz)   Height:        Pain is from 3-6 prn oxy given. Has scheduled tylenol. Would like to cut back on pain meds. SBA with activity. Lungs diminished. Has temp pacer wires still. Room air. Will continue to monitor. Angela Doss RN    DC instructions

## 2022-12-27 ENCOUNTER — TELEPHONE (OUTPATIENT)
Dept: CARDIOLOGY | Facility: CLINIC | Age: 57
End: 2022-12-27

## 2022-12-27 NOTE — TELEPHONE ENCOUNTER
Left detailed message fir [facility] indicating provider is out of the office, will follow up upon their return-NOEMI

## 2022-12-27 NOTE — TELEPHONE ENCOUNTER
Health Call Center    Phone Message    May a detailed message be left on voicemail: no     Reason for Call: Other: Maggy called from [facility] to request hold orders for Rx Eliquis. Please hold for 2 days prior to a colonoscopy scheduled on 1/23/23. If you have any questions, please reach out to Maggy at [phone].      Action Taken: Other: [facility]    Travel Screening: Not Applicable

## 2023-01-06 NOTE — TELEPHONE ENCOUNTER
Spoke with [facility] , provider hold parameters from Dr. Sawant for Eliquis. No further questions or concerns noted.-NOEMI

## 2023-01-06 NOTE — TELEPHONE ENCOUNTER
From: Anahi Sawant MD  Sent: 1/5/2023   4:48 PM CST  To: Shanice Rice    It is fine to hold Eliquis for 2-3 days prior to procedure and restart it post procedure.  Thanks  David

## 2023-01-26 ENCOUNTER — ANCILLARY PROCEDURE (OUTPATIENT)
Dept: MAMMOGRAPHY | Facility: HOSPITAL | Age: 58
End: 2023-01-26
Attending: FAMILY MEDICINE
Payer: COMMERCIAL

## 2023-01-26 DIAGNOSIS — Z12.31 SCREENING MAMMOGRAM FOR BREAST CANCER: ICD-10-CM

## 2023-01-26 PROCEDURE — 77067 SCR MAMMO BI INCL CAD: CPT

## 2023-02-03 ENCOUNTER — NURSE TRIAGE (OUTPATIENT)
Dept: CARDIOLOGY | Facility: CLINIC | Age: 58
End: 2023-02-03
Payer: COMMERCIAL

## 2023-02-03 NOTE — TELEPHONE ENCOUNTER
"Patient called stating she has been having heaviness/pressure in the left side of her chest for the last month. The symptoms come and go and she feels it more when she is tired. She says she needs to take more breaths than normal. She thinks possibly related to heart surgery or she explained it feeling like she didn't swallow pills completely. She reports no other symptoms. She says it occurred today. She does not feel she is emergent for the ED. Will route over to her care team with Dr. Anahi Sawant. Patient is scheduled for an appointment in April.    1. LOCATION: \"Where does it hurt?\" Left side.  2. RADIATION: \"Does the pain go anywhere else?\" (e.g., into neck, jaw, arms, back) No.  3. ONSET: \"When did the chest pain begin?\" (Minutes, hours or days) In the last month.  4. PATTERN \"Does the pain come and go, or has it been constant since it started?\" \"Does it get worse with exertion?\" Comes and goes.  5. DURATION: \"How long does it last\" (e.g., seconds, minutes, hours)     6. SEVERITY: \"How bad is the pain?\" (e.g., Scale 1-10; mild, moderate, or severe) Patient says it just feels like heaviness, pressure.  - MILD (1-3): doesn't interfere with normal activities   - MODERATE (4-7): interferes with normal activities or awakens from sleep   - SEVERE (8-10): excruciating pain, unable to do any normal activities   7. CARDIAC RISK FACTORS: \"Do you have any history of heart problems or risk factors for heart disease?\" (e.g., angina, prior heart attack; diabetes, high blood pressure, high cholesterol, smoker, or strong family history of heart disease) Atrial Myxoma, A-Fib.  8. PULMONARY RISK FACTORS: \"Do you have any history of lung disease?\" (e.g., blood clots in lung, asthma, emphysema, birth control pills) None.  9. CAUSE: \"What do you think is causing the chest pain?\" Related to recent heart surgery.  10. OTHER SYMPTOMS: \"Do you have any other symptoms?\" (e.g., dizziness, nausea, vomiting, sweating, fever, difficulty " breathing, cough) None.

## 2023-02-03 NOTE — TELEPHONE ENCOUNTER
"Pt states she has been feeling left sided \"heaviness/pressure\" off et on for the last month. This occurs when she breathes deeply. Denies chest pain or  heaviness/pressure with exertion. Denies SOB. She doesn't feel it is serious enough for an ED visit. She acknowledges she feels more anxious since her heart surgery Aug 2022, then myxoma Oct 2022.    She also reports she was recently diagnosed with Afib when she went in for routine colonoscopy. She has not reached out to her PCP for any of these complaints. She denies having COPD, or asthma.  She wanted to know if Dr Sawant has any recommendations.   Will review with Dr Sawant and call her back with any new recommendations.   She said she will also reach out to her PCP and see if she can be seen next week. jl  "

## 2023-02-06 NOTE — TELEPHONE ENCOUNTER
Anahi Sawant MD Lorang, Jill C RN  Caller: Unspecified (3 days ago,  3:43 PM)  She has normal coronary angiogram, less likely her symptoms are caused by CAD.   Her symptoms could be caused by atrial fibrillation.     Please schedule 10 days of cardiac event monitor for evaluation of atrial fib.  ECHO for evaluation of heart function and structure.     Thanks   David   ==  Spoke to patient with Dr Sawant's response and recommendations. She verbalized understanding.   She stated that she monitored her symptoms over the weekend and she wants to make an appt with her PCP to get an EKG and to ask her PCP about the possibility of a pill dislodged in her throat.  Since this has happened  In the past, and she had similar complaints/symptoms.    She will get back to us if she still feel she needs an event monitor or an Echo.  No orders were placed at this time. Will update Dr Sawant about her decision and wait for her return call, with what she decides to do.   jl

## 2023-02-06 NOTE — TELEPHONE ENCOUNTER
Dr Sawant,    I called Torrie this morning with your thoughts and recommendations.  She stated that she monitored her symptoms over the weekend and she wants to make an appt with her PCP to get an EKG in the office, and to ask her PCP about the possibility of a pill being dislodged in her throat.  Since this has happened in the past, and she had these similar complaints/symptoms.    She said she will get back to us.    Thanks, DEYA Velasquez

## 2023-02-07 ENCOUNTER — TRANSFERRED RECORDS (OUTPATIENT)
Dept: HEALTH INFORMATION MANAGEMENT | Facility: CLINIC | Age: 58
End: 2023-02-07

## 2023-02-20 ENCOUNTER — HOSPITAL ENCOUNTER (OUTPATIENT)
Dept: BONE DENSITY | Facility: HOSPITAL | Age: 58
Discharge: HOME | End: 2023-02-20
Attending: PHYSICIAN ASSISTANT | Admitting: PHYSICIAN ASSISTANT
Payer: COMMERCIAL

## 2023-02-20 DIAGNOSIS — M81.0 OSTEOPOROSIS: ICD-10-CM

## 2023-02-20 PROCEDURE — 77080 DXA BONE DENSITY AXIAL: CPT

## 2023-04-28 ENCOUNTER — OFFICE VISIT (OUTPATIENT)
Dept: CARDIOLOGY | Facility: CLINIC | Age: 58
End: 2023-04-28
Payer: COMMERCIAL

## 2023-04-28 VITALS
RESPIRATION RATE: 16 BRPM | BODY MASS INDEX: 25.51 KG/M2 | OXYGEN SATURATION: 100 % | SYSTOLIC BLOOD PRESSURE: 114 MMHG | HEART RATE: 55 BPM | DIASTOLIC BLOOD PRESSURE: 66 MMHG | WEIGHT: 135 LBS

## 2023-04-28 DIAGNOSIS — Z86.018 HISTORY OF ATRIAL MYXOMA: ICD-10-CM

## 2023-04-28 DIAGNOSIS — I42.8 NONISCHEMIC CARDIOMYOPATHY (H): ICD-10-CM

## 2023-04-28 DIAGNOSIS — E03.9 ACQUIRED HYPOTHYROIDISM: ICD-10-CM

## 2023-04-28 DIAGNOSIS — I48.0 PAROXYSMAL ATRIAL FIBRILLATION (H): Primary | ICD-10-CM

## 2023-04-28 PROCEDURE — 99213 OFFICE O/P EST LOW 20 MIN: CPT | Performed by: INTERNAL MEDICINE

## 2023-04-28 NOTE — LETTER
4/28/2023    Frank Hair MD  [address]  [place] [address]    RE: Torrie Meyer       Dear Colleague,     I had the pleasure of seeing Torrie Meyer in the [facility].      Click to link to [facility] HEART CARE NOTE       Assessment/Plan:   1.  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    2.  Paroxysmal atrial fibrillation, s/p PVI and HEATHER ligation on August 29, 2022: The patient had no episode of atrial fibrillation over last 6 months.  She is not on medications.  Her pulmonary vein CT study demonstrated left atrial appendage he was resected, no evidence of thrombus formation.  The patient was off anticoagulation.  Currently she is on aspirin 81 mg daily.      3.  Mild cardiomyopathy LVEF of 45%: Currently she has no signs of congestive heart failure.  No symptoms.    Repeat echocardiogram to see if her LV function is recovered to normal range, and the recurrent myxoma.    4.  Hypothyroidism: Continue levothyroxine, follow-up with Dr. Hair.    Thank you for the opportunity to be involved in the care of Torrie Meyer. If you have any questions, please feel free to contact me.  I will see the patient again in 12 months and as needed    Much or all of the text in this note was generated through the use of Dragon Dictate voice-to-text software. Errors in spelling or words which seem out of context are unintentional.   Sound alike errors, in particular, may have escaped editing.       History of Present Illness:   It is my pleasure to see Torrie Meyer at the [facility] for routine cardiology follow-up. Torrie Meyer is a 58 year old female with a medical history of hypothyroidism, paroxysmal atrial fibrillation status post PVI and HEATHER ligation on August 29, 2022, left atrial myxoma status post surgical resection on October 29, 2022, mild cardiomyopathy LVEF 45%.    The patient states  that she has been doing fine over last 6 months.  She had no chest pain, shortness of breath, palpitations, dizziness, orthopnea, PND or leg edema.  Her blood pressure and heart rate are controlled.      Past Medical History:     Patient Active Problem List   Diagnosis    Atrial fibrillation with slow ventricular response (H)    Acquired hypothyroidism    Atrial myxoma    Sinus bradycardia    Hypomagnesemia    Short of breath on exertion    Left atrial mass       Past Surgical History:     Past Surgical History:   Procedure Laterality Date    CV CORONARY ANGIOGRAM N/A 8/25/2022    Procedure: Coronary Angiogram;  Surgeon: Najma Pinedo MD;  Location: [facility] CATH LAB CV    MITRAL VALVE REPAIR N/A 8/29/2022    Procedure: LEFT ATRIAL MXYOMA REMOVAL, ANESTHESIA TRANSESOPHAGEAL ECHCARDIOGRAM,BILATERAL PULMONARY VEIN ISOLATION, EPIAORTIC ULTRASOUND;  Surgeon: Jeniffer Loredo MD;  Location: [facility]    OR LIGATION, LEFT ATRIAL APPENDAGE Left 8/29/2022    Procedure: REMOVAL LEFT ATRIAL APPENDAGE;  Surgeon: Jeniffer Loredo MD;  Location: [facility]       Family History:     Family History   Problem Relation Age of Onset    Cancer Mother         stomach    Cancer Father         prostate    No Known Problems Sister     No Known Problems Daughter     No Known Problems Maternal Grandmother     No Known Problems Maternal Grandfather     No Known Problems Paternal Grandmother     No Known Problems Paternal Grandfather     No Known Problems Maternal Aunt     No Known Problems Paternal Aunt     Hereditary Breast and Ovarian Cancer Syndrome No family hx of     Breast Cancer No family hx of     Colon Cancer No family hx of     Endometrial Cancer No family hx of     Ovarian Cancer No family hx of        Social History:    reports that she has never smoked. She has never used smokeless tobacco.    Review of Systems:   12 systems are reviewed negative except for in HPI.    Meds:     Current Outpatient  Medications:     acetaminophen (TYLENOL) 325 MG tablet, Take 2 tablets (650 mg) by mouth every 4 hours as needed for other, headaches or fever (For optimal non-opioid multimodal pain management to improve pain control.), Disp: 30 tablet, Rfl: 0    alendronate (FOSAMAX) 70 MG tablet, TAKE 1 TABLET BY MOUTH 1 TIME A WEEK, Disp: , Rfl:     aspirin (ASA) 81 MG EC tablet, Take 1 tablet (81 mg) by mouth daily, Disp: 90 tablet, Rfl: 4    cyanocobalamin (VITAMIN B-12) 1000 MCG tablet, Take 1,000 mcg by mouth daily, Disp: , Rfl:     fish oil-omega-3 fatty acids 1000 MG capsule, Take 1 g by mouth daily, Disp: , Rfl:     hydrocortisone, Perianal, (ANUSOL-HC) 2.5 % cream, , Disp: , Rfl:     levothyroxine (SYNTHROID/LEVOTHROID) 175 MCG tablet, Take 175 mcg by mouth daily, Disp: , Rfl:     multivitamin w/minerals (THERA-VIT-M) tablet, Take 1 tablet by mouth daily, Disp: , Rfl:     vitamin B-Complex, Take 1 tablet by mouth daily, Disp: , Rfl:     vitamin C (ASCORBIC ACID) 250 MG tablet, Take 250 mg by mouth daily, Disp: , Rfl:     Vitamin D3 (CHOLECALCIFEROL) 25 mcg (1000 units) tablet, Take 1 tablet by mouth daily, Disp: , Rfl:     Lidocaine (LIDOCARE) 4 % Patch, Place 2 patches onto the skin every 24 hours To prevent lidocaine toxicity, patient should be patch free for 12 hrs daily. Apply 1 patch to each side of the sternal incision for 12 hours and remove for 12 hours. (Patient not taking: Reported on 4/28/2023), Disp: 20 patch, Rfl: 0    pantoprazole (PROTONIX) 40 MG EC tablet, Take 1 tablet (40 mg) by mouth daily (Patient not taking: Reported on 10/13/2022), Disp: 30 tablet, Rfl: 0     Allergies:   Patient has no known allergies.    Objective:      Physical Exam  61.2 kg (135 lb)     Body mass index is 25.51 kg/m .  /66 (BP Location: Left arm, Patient Position: Sitting, Cuff Size: Adult Regular)   Pulse 55   Resp 16   Wt 61.2 kg (135 lb)   SpO2 100%   BMI 25.51 kg/m      General Appearance:   Awake, Alert, No acute  distress.   HEENT:  Pupil equal, reactive to light. No scleral icterus; the mucous membranes were moist. No oral ulcers or thrush.    Neck: No cervical bruits. No JVD. No thyromegaly. No lymph node enlargement or tenderness.   Chest: The spine was straight. The chest was symmetric.   Lungs:   Respirations unlabored. Lungs are clear to auscultation. No crackles. No wheezing.   Cardiovascular:   Regular, bradycardiac, normal first and second heart sounds with no murmurs. No rubs or gallops.    Abdomen:  Soft. No tenderness. Non-distended. Bowels sounds are present   Extremities: Equal posterior tibial pulses. No leg edema.   Skin: No rashes or ulcers. Warm, Dry.   Musculoskeletal: No tenderness. No deformity.   Neurologic: Mood and affect are appropriate. No focal deficits.         EKG:  Personally reivewed  Sinus rhythm of with occasional PVC  When compared to previous ECG  Sinus rhythm has replaced atrial fibrillation.    MARII on August 24, 2022:  1. The left ventricle is normal in size. Left ventricular systolic performance  is mild to moderately reduced. The ejection fraction is estimated to be 40-45%.  2. There is mild to moderate global reduction in left ventricular systolic performance.  3. There is a 3 x 4 x 4.5 cm mass in the left atrium attached to the atrial  septum. The echocardiographic features and anatomic location are paradigmatic  of an atrial myxoma. The mass moves forward in diastole abutting the mitral  valve leaflets and extends through the mitral annular valve plane.  4. Mild right ventricular enlargement with low normal right ventricular systolic performance.  5. There is moderate left atrial enlargement. There is mild to moderate right atrial enlargement.  6. No thrombus is detected within the left atrial appendage.  7. Color Doppler interrogation the atrial septum suggest the presence of a  small patent foramen ovale (PFO). No right to left shunting, however, is  detected with echo contrast  "(\"bubble\") study.    Coronary angiogram on August 25, 2022:  Normal coronary arteries    Lab Review   Lab Results   Component Value Date     08/17/2022    CO2 28 08/17/2022    CO2 27 04/12/2022    BUN 18.3 08/17/2022    BUN 19 04/12/2022     No results found for: WBC, HGB, HCT, MCV, PLT  Lab Results   Component Value Date    CHOL 151 07/23/2020    TRIG 50 07/23/2020    HDL 58 07/23/2020    LDL 83 07/23/2020     LDL Cholesterol Calculated   Date Value Ref Range Status   08/23/2022 112 <=129 mg/dL Final     No results found for: TROPONINI  No results found for: BNP  Lab Results   Component Value Date    TSH 78.80 08/17/2022    TSH 0.03 04/27/2022                   Thank you for allowing me to participate in the care of your patient.      Sincerely,     Anahi Sawant MD     [facility]  cc:   Anahi Sawant MD  [facility] YOUNG [facility] [address]  [place] [address]        "

## 2023-04-28 NOTE — PROGRESS NOTES
Click to link to [facility] HEART CARE NOTE       Assessment/Plan:   1.  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    2.  Paroxysmal atrial fibrillation, s/p PVI and HEATHER ligation on August 29, 2022: The patient had no episode of atrial fibrillation over last 6 months.  She is not on medications.  Her pulmonary vein CT study demonstrated left atrial appendage he was resected, no evidence of thrombus formation.  The patient was off anticoagulation.  Currently she is on aspirin 81 mg daily.      3.  Mild cardiomyopathy LVEF of 45%: Currently she has no signs of congestive heart failure.  No symptoms.    Repeat echocardiogram to see if her LV function is recovered to normal range, and the recurrent myxoma.    4.  Hypothyroidism: Continue levothyroxine, follow-up with Dr. Hair.    Thank you for the opportunity to be involved in the care of Torrie Meyer. If you have any questions, please feel free to contact me.  I will see the patient again in 12 months and as needed    Much or all of the text in this note was generated through the use of Dragon Dictate voice-to-text software. Errors in spelling or words which seem out of context are unintentional.   Sound alike errors, in particular, may have escaped editing.       History of Present Illness:   It is my pleasure to see Torrie Meyer at the [facility] Care clinic for routine cardiology follow-up. Torrie Meyer is a 58 year old female with a medical history of hypothyroidism, paroxysmal atrial fibrillation status post PVI and HEATHER ligation on August 29, 2022, left atrial myxoma status post surgical resection on October 29, 2022, mild cardiomyopathy LVEF 45%.    The patient states that she has been doing fine over last 6 months.  She had no chest pain, shortness of breath, palpitations, dizziness, orthopnea, PND or leg edema.  Her blood pressure and heart rate are controlled.      Past Medical  History:     Patient Active Problem List   Diagnosis     Atrial fibrillation with slow ventricular response (H)     Acquired hypothyroidism     Atrial myxoma     Sinus bradycardia     Hypomagnesemia     Short of breath on exertion     Left atrial mass       Past Surgical History:     Past Surgical History:   Procedure Laterality Date     CV CORONARY ANGIOGRAM N/A 8/25/2022    Procedure: Coronary Angiogram;  Surgeon: Najma Pinedo MD;  Location: [facility] CATH LAB CV     MITRAL VALVE REPAIR N/A 8/29/2022    Procedure: LEFT ATRIAL MXYOMA REMOVAL, ANESTHESIA TRANSESOPHAGEAL ECHCARDIOGRAM,BILATERAL PULMONARY VEIN ISOLATION, EPIAORTIC ULTRASOUND;  Surgeon: Jeniffer Loredo MD;  Location: [facility]     OR LIGATION, LEFT ATRIAL APPENDAGE Left 8/29/2022    Procedure: REMOVAL LEFT ATRIAL APPENDAGE;  Surgeon: Jeniffer Loredo MD;  Location: [facility]       Family History:     Family History   Problem Relation Age of Onset     Cancer Mother         stomach     Cancer Father         prostate     No Known Problems Sister      No Known Problems Daughter      No Known Problems Maternal Grandmother      No Known Problems Maternal Grandfather      No Known Problems Paternal Grandmother      No Known Problems Paternal Grandfather      No Known Problems Maternal Aunt      No Known Problems Paternal Aunt      Hereditary Breast and Ovarian Cancer Syndrome No family hx of      Breast Cancer No family hx of      Colon Cancer No family hx of      Endometrial Cancer No family hx of      Ovarian Cancer No family hx of        Social History:    reports that she has never smoked. She has never used smokeless tobacco.    Review of Systems:   12 systems are reviewed negative except for in HPI.    Meds:     Current Outpatient Medications:      acetaminophen (TYLENOL) 325 MG tablet, Take 2 tablets (650 mg) by mouth every 4 hours as needed for other, headaches or fever (For optimal non-opioid multimodal pain management to  improve pain control.), Disp: 30 tablet, Rfl: 0     alendronate (FOSAMAX) 70 MG tablet, TAKE 1 TABLET BY MOUTH 1 TIME A WEEK, Disp: , Rfl:      aspirin (ASA) 81 MG EC tablet, Take 1 tablet (81 mg) by mouth daily, Disp: 90 tablet, Rfl: 4     cyanocobalamin (VITAMIN B-12) 1000 MCG tablet, Take 1,000 mcg by mouth daily, Disp: , Rfl:      fish oil-omega-3 fatty acids 1000 MG capsule, Take 1 g by mouth daily, Disp: , Rfl:      hydrocortisone, Perianal, (ANUSOL-HC) 2.5 % cream, , Disp: , Rfl:      levothyroxine (SYNTHROID/LEVOTHROID) 175 MCG tablet, Take 175 mcg by mouth daily, Disp: , Rfl:      multivitamin w/minerals (THERA-VIT-M) tablet, Take 1 tablet by mouth daily, Disp: , Rfl:      vitamin B-Complex, Take 1 tablet by mouth daily, Disp: , Rfl:      vitamin C (ASCORBIC ACID) 250 MG tablet, Take 250 mg by mouth daily, Disp: , Rfl:      Vitamin D3 (CHOLECALCIFEROL) 25 mcg (1000 units) tablet, Take 1 tablet by mouth daily, Disp: , Rfl:      Lidocaine (LIDOCARE) 4 % Patch, Place 2 patches onto the skin every 24 hours To prevent lidocaine toxicity, patient should be patch free for 12 hrs daily. Apply 1 patch to each side of the sternal incision for 12 hours and remove for 12 hours. (Patient not taking: Reported on 4/28/2023), Disp: 20 patch, Rfl: 0     pantoprazole (PROTONIX) 40 MG EC tablet, Take 1 tablet (40 mg) by mouth daily (Patient not taking: Reported on 10/13/2022), Disp: 30 tablet, Rfl: 0     Allergies:   Patient has no known allergies.    Objective:      Physical Exam  61.2 kg (135 lb)     Body mass index is 25.51 kg/m .  /66 (BP Location: Left arm, Patient Position: Sitting, Cuff Size: Adult Regular)   Pulse 55   Resp 16   Wt 61.2 kg (135 lb)   SpO2 100%   BMI 25.51 kg/m      General Appearance:   Awake, Alert, No acute distress.   HEENT:  Pupil equal, reactive to light. No scleral icterus; the mucous membranes were moist. No oral ulcers or thrush.    Neck: No cervical bruits. No JVD. No thyromegaly.  "No lymph node enlargement or tenderness.   Chest: The spine was straight. The chest was symmetric.   Lungs:   Respirations unlabored. Lungs are clear to auscultation. No crackles. No wheezing.   Cardiovascular:   Regular, bradycardiac, normal first and second heart sounds with no murmurs. No rubs or gallops.    Abdomen:  Soft. No tenderness. Non-distended. Bowels sounds are present   Extremities: Equal posterior tibial pulses. No leg edema.   Skin: No rashes or ulcers. Warm, Dry.   Musculoskeletal: No tenderness. No deformity.   Neurologic: Mood and affect are appropriate. No focal deficits.         EKG:  Personally reivewed  Sinus rhythm of with occasional PVC  When compared to previous ECG  Sinus rhythm has replaced atrial fibrillation.    MARII on August 24, 2022:  1. The left ventricle is normal in size. Left ventricular systolic performance  is mild to moderately reduced. The ejection fraction is estimated to be 40-45%.  2. There is mild to moderate global reduction in left ventricular systolic performance.  3. There is a 3 x 4 x 4.5 cm mass in the left atrium attached to the atrial  septum. The echocardiographic features and anatomic location are paradigmatic  of an atrial myxoma. The mass moves forward in diastole abutting the mitral  valve leaflets and extends through the mitral annular valve plane.  4. Mild right ventricular enlargement with low normal right ventricular systolic performance.  5. There is moderate left atrial enlargement. There is mild to moderate right atrial enlargement.  6. No thrombus is detected within the left atrial appendage.  7. Color Doppler interrogation the atrial septum suggest the presence of a  small patent foramen ovale (PFO). No right to left shunting, however, is  detected with echo contrast (\"bubble\") study.    Coronary angiogram on August 25, 2022:  Normal coronary arteries    Lab Review   Lab Results   Component Value Date     08/17/2022    CO2 28 08/17/2022    CO2 " 27 04/12/2022    BUN 18.3 08/17/2022    BUN 19 04/12/2022     No results found for: WBC, HGB, HCT, MCV, PLT  Lab Results   Component Value Date    CHOL 151 07/23/2020    TRIG 50 07/23/2020    HDL 58 07/23/2020    LDL 83 07/23/2020     LDL Cholesterol Calculated   Date Value Ref Range Status   08/23/2022 112 <=129 mg/dL Final     No results found for: TROPONINI  No results found for: BNP  Lab Results   Component Value Date    TSH 78.80 08/17/2022    TSH 0.03 04/27/2022

## 2023-05-05 ENCOUNTER — LAB REQUISITION (OUTPATIENT)
Dept: LAB | Facility: CLINIC | Age: 58
End: 2023-05-05

## 2023-05-05 ENCOUNTER — TRANSFERRED RECORDS (OUTPATIENT)
Dept: HEALTH INFORMATION MANAGEMENT | Facility: CLINIC | Age: 58
End: 2023-05-05

## 2023-05-05 DIAGNOSIS — E03.9 HYPOTHYROIDISM, UNSPECIFIED: ICD-10-CM

## 2023-05-05 DIAGNOSIS — Z13.1 ENCOUNTER FOR SCREENING FOR DIABETES MELLITUS: ICD-10-CM

## 2023-05-05 DIAGNOSIS — M81.0 AGE-RELATED OSTEOPOROSIS WITHOUT CURRENT PATHOLOGICAL FRACTURE: ICD-10-CM

## 2023-05-05 DIAGNOSIS — D53.9 NUTRITIONAL ANEMIA, UNSPECIFIED: ICD-10-CM

## 2023-05-05 LAB
ALBUMIN SERPL BCG-MCNC: 4.5 G/DL (ref 3.5–5.2)
ALP SERPL-CCNC: 63 U/L (ref 35–104)
ALT SERPL W P-5'-P-CCNC: 24 U/L (ref 10–35)
ANION GAP SERPL CALCULATED.3IONS-SCNC: 14 MMOL/L (ref 7–15)
AST SERPL W P-5'-P-CCNC: 27 U/L (ref 10–35)
BILIRUB SERPL-MCNC: 0.4 MG/DL
BUN SERPL-MCNC: 13.9 MG/DL (ref 6–20)
CALCIUM SERPL-MCNC: 9.7 MG/DL (ref 8.6–10)
CHLORIDE SERPL-SCNC: 104 MMOL/L (ref 98–107)
CHOLEST SERPL-MCNC: 192 MG/DL
CREAT SERPL-MCNC: 0.72 MG/DL (ref 0.51–0.95)
DEPRECATED HCO3 PLAS-SCNC: 22 MMOL/L (ref 22–29)
FOLATE SERPL-MCNC: >40 NG/ML (ref 4.6–34.8)
GFR SERPL CREATININE-BSD FRML MDRD: >90 ML/MIN/1.73M2
GLUCOSE SERPL-MCNC: 91 MG/DL (ref 70–99)
HDLC SERPL-MCNC: 71 MG/DL
LDLC SERPL CALC-MCNC: 112 MG/DL
NONHDLC SERPL-MCNC: 121 MG/DL
POTASSIUM SERPL-SCNC: 3.8 MMOL/L (ref 3.4–5.3)
PROT SERPL-MCNC: 6.3 G/DL (ref 6.4–8.3)
SODIUM SERPL-SCNC: 140 MMOL/L (ref 136–145)
TRIGL SERPL-MCNC: 46 MG/DL
TSH SERPL DL<=0.005 MIU/L-ACNC: 0.44 UIU/ML (ref 0.3–4.2)

## 2023-05-05 PROCEDURE — 82746 ASSAY OF FOLIC ACID SERUM: CPT | Performed by: FAMILY MEDICINE

## 2023-05-05 PROCEDURE — 84443 ASSAY THYROID STIM HORMONE: CPT | Performed by: FAMILY MEDICINE

## 2023-05-05 PROCEDURE — 82652 VIT D 1 25-DIHYDROXY: CPT | Performed by: FAMILY MEDICINE

## 2023-05-05 PROCEDURE — 80053 COMPREHEN METABOLIC PANEL: CPT | Performed by: FAMILY MEDICINE

## 2023-05-05 PROCEDURE — 82607 VITAMIN B-12: CPT | Performed by: FAMILY MEDICINE

## 2023-05-05 PROCEDURE — 80061 LIPID PANEL: CPT | Performed by: FAMILY MEDICINE

## 2023-05-06 LAB — VIT B12 SERPL-MCNC: 2300 PG/ML (ref 232–1245)

## 2023-05-10 LAB — 1,25(OH)2D SERPL-MCNC: 40.7 PG/ML (ref 19.9–79.3)

## 2023-05-25 ENCOUNTER — HOSPITAL ENCOUNTER (OUTPATIENT)
Dept: CARDIOLOGY | Facility: CLINIC | Age: 58
Discharge: HOME | End: 2023-05-25
Attending: INTERNAL MEDICINE | Admitting: INTERNAL MEDICINE
Payer: COMMERCIAL

## 2023-05-25 LAB — LVEF ECHO: NORMAL

## 2023-05-25 PROCEDURE — 93306 TTE W/DOPPLER COMPLETE: CPT

## 2023-05-25 PROCEDURE — 93306 TTE W/DOPPLER COMPLETE: CPT | Mod: 26 | Performed by: INTERNAL MEDICINE

## 2023-07-27 NOTE — PLAN OF CARE
Problem: Respiratory Compromise (Cardiovascular Surgery)  Goal: Effective Oxygenation and Ventilation (Cardiovascular Surgery)  Outcome: Ongoing, Progressing  Intervention: Promote Airway Secretion Clearance  Recent Flowsheet Documentation  Taken 8/30/2022 1619 by Gabriela Cade, RT  Administration (IS): instruction provided, follow-up  Level Incentive Spirometer (mL): 750  Cough And Deep Breathing: done with encouragement  Number of Repetitions (IS): 8  Patient Tolerance (IS): good  Taken 8/30/2022 1133 by Gabriela Cade, RT  Administration (IS): instruction provided, follow-up  Level Incentive Spirometer (mL): 1000  Cough And Deep Breathing: done with encouragement  Number of Repetitions (IS): 8  Patient Tolerance (IS): good  Taken 8/30/2022 0750 by Gabriela Cade, RT  Administration (IS): instruction provided, follow-up  Level Incentive Spirometer (mL): 750  Cough And Deep Breathing: done with encouragement  Number of Repetitions (IS): 8  Patient Tolerance (IS): good    Gabriela Cade, RT                        Tissue Cultured Epidermal Autograft Text: The defect edges were debeveled with a #15 scalpel blade. Given the location of the defect, shape of the defect and the proximity to free margins a tissue cultured epidermal autograft was deemed most appropriate.  The graft was then trimmed to fit the size of the defect.  The graft was then placed in the primary defect and oriented appropriately.

## 2023-08-23 DIAGNOSIS — I48.91 ATRIAL FIBRILLATION WITH SLOW VENTRICULAR RESPONSE (H): ICD-10-CM

## 2023-08-23 RX ORDER — ASPIRIN 81 MG/1
81 TABLET ORAL DAILY
Qty: 90 TABLET | Refills: 1 | Status: SHIPPED | OUTPATIENT
Start: 2023-08-23 | End: 2024-02-20

## 2023-08-29 ENCOUNTER — TELEPHONE (OUTPATIENT)
Dept: CARDIOLOGY | Facility: CLINIC | Age: 58
End: 2023-08-29
Payer: COMMERCIAL

## 2023-08-29 NOTE — TELEPHONE ENCOUNTER
[facility] Call Center    Phone Message    May a detailed message be left on voicemail: yes     Reason for Call: Medication Question or concern regarding medication   Prescription Clarification  Name of Medication: aspirin 81 MG EC tablet   Prescribing Provider: Dr Sawant    Pharmacy:    What on the order needs clarification? The patient would like to know if she still needs to take th baby aspirin and if so,will she have to take this for the rest of her life?      Action Taken: Other: Cardiology    Travel Screening: Not Applicable    Thank you!  [facility]

## 2023-08-30 NOTE — TELEPHONE ENCOUNTER
Called patient-- unable to reach. Left message for patient to return call to discuss recommendations. -sea

## 2023-08-30 NOTE — TELEPHONE ENCOUNTER
----- Message -----  From: Anahi Sawant MD  Sent: 8/30/2023   1:28 PM CDT  To: Noreen Martinez RN    Aspirin 81 mg daily.    ==  Phone call to patient. Reviewed need for Aspirin 81 mg daily per DL. Patient verbalized understanding and agreement with plan. No further questions/concerns. -taurus

## 2023-12-21 ENCOUNTER — PATIENT OUTREACH (OUTPATIENT)
Dept: CARE COORDINATION | Facility: CLINIC | Age: 58
End: 2023-12-21
Payer: COMMERCIAL

## 2023-12-21 NOTE — PROGRESS NOTES
Clinic Care Coordination Contact  Program:  [facility]: [place]   Renewal: [employer]  Date Applied:     STEPAN Outreach:   12/21/23: 1st outreach attempt. Left a message on voicemail with call back information and requested return call.  Plan: CTA will call again within 2 weeks.  Kayla Forman  Care   [facility]  Clinic Care Coordination  [phone]      Health Insurance:      Referral/Screening:

## 2023-12-22 ENCOUNTER — PATIENT OUTREACH (OUTPATIENT)
Dept: CARE COORDINATION | Facility: CLINIC | Age: 58
End: 2023-12-22
Payer: COMMERCIAL

## 2023-12-22 NOTE — PROGRESS NOTES
Clinic Care Coordination Contact  Program:  [facility]: [place]   Renewal: [employer]  Date Applied:     STEPAN Outreach:   12/22/23: CTA called to see if patient needed assistance with their [employer] Renewal. Patient declined needing assistance and no follow up needed   CTA will mail application to pt.  Kayla Moncada   [facility] Care Coordination  [phone]    12/21/23: 1st outreach attempt. Left a message on [employer] with call back information and requested return call.  Plan: CTA will call again within 2 weeks.  Kayla Moncada   [facility] Care Coordination  [phone]      Health Insurance:      Referral/Screening:

## 2024-02-17 DIAGNOSIS — I48.91 ATRIAL FIBRILLATION WITH SLOW VENTRICULAR RESPONSE (H): ICD-10-CM

## 2024-02-20 RX ORDER — ASPIRIN 81 MG/1
81 TABLET, COATED ORAL DAILY
Qty: 90 TABLET | Refills: 0 | Status: SHIPPED | OUTPATIENT
Start: 2024-02-20 | End: 2024-03-19

## 2024-02-22 ENCOUNTER — OFFICE VISIT (OUTPATIENT)
Dept: CARDIOLOGY | Facility: CLINIC | Age: 59
End: 2024-02-22
Payer: COMMERCIAL

## 2024-02-22 VITALS
WEIGHT: 133 LBS | BODY MASS INDEX: 25.11 KG/M2 | RESPIRATION RATE: 16 BRPM | SYSTOLIC BLOOD PRESSURE: 116 MMHG | HEART RATE: 54 BPM | DIASTOLIC BLOOD PRESSURE: 64 MMHG | HEIGHT: 61 IN

## 2024-02-22 DIAGNOSIS — I42.8 NONISCHEMIC CARDIOMYOPATHY (H): ICD-10-CM

## 2024-02-22 DIAGNOSIS — I48.0 PAROXYSMAL ATRIAL FIBRILLATION (H): Primary | ICD-10-CM

## 2024-02-22 DIAGNOSIS — Z86.018 HISTORY OF ATRIAL MYXOMA: ICD-10-CM

## 2024-02-22 PROCEDURE — 99214 OFFICE O/P EST MOD 30 MIN: CPT

## 2024-02-22 NOTE — LETTER
2/22/2024    Frank Hair MD  [address]  [facility] [address]    RE: Torrie Meyer       Dear Colleague,     I had the pleasure of seeing Torrie Meyer in the [facility].    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, without recurrence Echo 4/2023  Cardiomyopathy with recovered EF: Echo 4/2023 LVEF 60-65% improved from mildly reduced EF 45%  PAC and PVC on ECG 1 year ago: Patient denies palpitations or other red flag symptoms, regular rhythm and rate without premature beats noted on exam, discussed finding is quite benign no further rhythm monitoring necessary at this time    Plan:   Schedule echocardiogram in 2 months for monitoring of atrial myxoma recurrence  Continue frequent exercise and healthy lifestyle        Follow up in 1 year or sooner as needed     History of Present Illness/Subjective    HPI: Torrie Meyer is a 59 year old female with PMHx of left atrial myxoma status postresection, paroxysmal atrial fibrillation status post PVI and HEATHER ligation, hypothyroidism,  presents for follow up.      Torrie is feeling very well, she is an avid runner most days of the week.  She denies any chest discomfort, shortness of breath, palpitations or lower extremity swelling, lightheadedness.  Asked about need for monitoring with history of atrial myxoma, noted some irregular beats found on the past EKG that she wonders about.      Echocardiogram 4/2023 Results:  Left ventricular size, wall motion and function are normal. The ejection  fraction is 60-65%.  Normal right ventricle size and systolic function.  The left atrium is moderate to severely dilated.  No left atrial mass or thrombus visualized.  There is trace to mild mitral  "regurgitation.    Coronary angiogram 8/2022  57-year-old female with recent diagnosis of atrial myxoma, here for preoperative coronary angiography.     Coronary angiography showed no coronary artery disease with minimal atherosclerosis and a right dominant coronary system.     Calcified mass was noted on fluoroscopy, which likely represented the atrial myxoma     Physical Examination  Review of Systems   Vitals: /64 (BP Location: Left arm, Patient Position: Sitting, Cuff Size: Adult Regular)   Pulse 54   Resp 16   Ht 1.549 m (5' 1\")   Wt 60.3 kg (133 lb)   BMI 25.13 kg/m    BMI= Body mass index is 25.13 kg/m .  Wt Readings from Last 3 Encounters:   02/22/24 60.3 kg (133 lb)   04/28/23 61.2 kg (135 lb)   10/13/22 58.5 kg (129 lb)           ENT/Mouth: membranes moist, no oral lesions or bleeding gums.      EYES:  no scleral icterus, normal conjunctivae       Chest/Lungs:   lungs are clear to auscultation, no rales or wheezing, equal chest wall expansion    Cardiovascular:   Regular. Normal first and second heart sounds with no murmurs, rubs, or gallops; the radial and posterior tibial pulses are intact, absent edema bilaterally        Extremities: no cyanosis or clubbing   Skin: no xanthelasma, warm.    Neurologic: no tremors     Psychiatric: alert and oriented x3, calm        Please refer above for cardiac ROS details.        Medical History  Surgical History Family History Social History   No past medical history on file.  Past Surgical History:   Procedure Laterality Date    CV CORONARY ANGIOGRAM N/A 8/25/2022    Procedure: Coronary Angiogram;  Surgeon: Najma Pinedo MD;  Location: [facility] CATH LAB CV    MITRAL VALVE REPAIR N/A 8/29/2022    Procedure: LEFT ATRIAL MXYOMA REMOVAL, ANESTHESIA TRANSESOPHAGEAL ECHCARDIOGRAM,BILATERAL PULMONARY VEIN ISOLATION, EPIAORTIC ULTRASOUND;  Surgeon: Jeniffer Loredo MD;  Location: [facility] Main OR    OR LIGATION, LEFT ATRIAL APPENDAGE Left 8/29/2022    " Procedure: REMOVAL LEFT ATRIAL APPENDAGE;  Surgeon: Jeniffer Loredo MD;  Location: [facility] Main OR     Family History   Problem Relation Age of Onset    Cancer Mother         stomach    Cancer Father         prostate    No Known Problems Sister     No Known Problems Daughter     No Known Problems Maternal Grandmother     No Known Problems Maternal Grandfather     No Known Problems Paternal Grandmother     No Known Problems Paternal Grandfather     No Known Problems Maternal Aunt     No Known Problems Paternal Aunt     Hereditary Breast and Ovarian Cancer Syndrome No family hx of     Breast Cancer No family hx of     Colon Cancer No family hx of     Endometrial Cancer No family hx of     Ovarian Cancer No family hx of         Social History     Socioeconomic History    Marital status: Single     Spouse name: Not on file    Number of children: Not on file    Years of education: Not on file    Highest education level: Not on file   Occupational History    Not on file   Tobacco Use    Smoking status: Never    Smokeless tobacco: Never   Substance and Sexual Activity    Alcohol use: Not on file    Drug use: Not on file    Sexual activity: Not on file   Other Topics Concern    Not on file   Social History Narrative    Not on file     Social Determinants of Health     Financial Resource Strain: Not on file   Food Insecurity: Not on file   Transportation Needs: Not on file   Physical Activity: Not on file   Stress: Not on file   Social Connections: Not on file   Interpersonal Safety: Not on file   Housing Stability: Not on file           Medications  Allergies   Current Outpatient Medications   Medication Sig Dispense Refill    acetaminophen (TYLENOL) 325 MG tablet Take 2 tablets (650 mg) by mouth every 4 hours as needed for other, headaches or fever (For optimal non-opioid multimodal pain management to improve pain control.) 30 tablet 0    alendronate (FOSAMAX) 70 MG tablet TAKE 1 TABLET BY MOUTH 1 TIME A WEEK       "aspirin (ASPIRIN LOW DOSE) 81 MG EC tablet TAKE 1 TABLET(81 MG) BY MOUTH DAILY 90 tablet 0    cyanocobalamin (VITAMIN B-12) 1000 MCG tablet Take 1,000 mcg by mouth daily      fish oil-omega-3 fatty acids 1000 MG capsule Take 1 g by mouth daily      hydrocortisone, Perianal, (ANUSOL-HC) 2.5 % cream Place rectally as needed      levothyroxine (SYNTHROID/LEVOTHROID) 175 MCG tablet Take 175 mcg by mouth daily      multivitamin w/minerals (THERA-VIT-M) tablet Take 1 tablet by mouth daily      pantoprazole (PROTONIX) 40 MG EC tablet Take 1 tablet (40 mg) by mouth daily (Patient taking differently: Take 20 mg by mouth daily) 30 tablet 0    vitamin B-Complex Take 1 tablet by mouth daily      vitamin C (ASCORBIC ACID) 250 MG tablet Take 250 mg by mouth daily      Vitamin D3 (CHOLECALCIFEROL) 25 mcg (1000 units) tablet Take 1 tablet by mouth daily      Lidocaine (LIDOCARE) 4 % Patch Place 2 patches onto the skin every 24 hours To prevent lidocaine toxicity, patient should be patch free for 12 hrs daily. Apply 1 patch to each side of the sternal incision for 12 hours and remove for 12 hours. (Patient not taking: Reported on 4/28/2023) 20 patch 0     No Known Allergies       Lab Results    Chemistry/lipid CBC Cardiac Enzymes/BNP/TSH/INR   Recent Labs   Lab Test 05/05/23  1538   CHOL 192   HDL 71   *   TRIG 46     Recent Labs   Lab Test 05/05/23  1538 08/23/22  1802 07/23/20  1038   * 112 83     Recent Labs   Lab Test 05/05/23  1538      POTASSIUM 3.8   CHLORIDE 104   CO2 22   GLC 91   BUN 13.9   CR 0.72   GFRESTIMATED >90   YEVGENIY 9.7     Recent Labs   Lab Test 05/05/23  1538 10/28/22  1737 09/19/22  1518   CR 0.72 0.6 0.69     Recent Labs   Lab Test 08/24/22  1437   A1C 4.9          Recent Labs   Lab Test 09/02/22  0502   WBC 8.4   HGB 8.0*   HCT 22.9*   MCV 95   *     Recent Labs   Lab Test 09/02/22  0502 08/30/22  0510 08/29/22  2303   HGB 8.0* 9.2* 10.0*    No results for input(s): \"TROPONINI\" in the " "last 32985 hours.  No results for input(s): \"BNP\", \"NTBNPI\", \"NTBNP\" in the last 25650 hours.  Recent Labs   Lab Test 05/05/23  1538   TSH 0.44     Recent Labs   Lab Test 08/29/22  1645 08/29/22  1533 08/26/22  0641   INR 1.39* 1.44* 1.12          This note has been dictated using voice recognition software. Any grammatical, typographical, or context distortions are unintentional and inherent to the software    Josy Kuhn PA-C          Thank you for allowing me to participate in the care of your patient.      Sincerely,     Josy Denny PA-C     [facility]  cc:   Anahi Sawant MD  [address] [place] [id number]      "

## 2024-02-22 NOTE — PROGRESS NOTES
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, without recurrence Echo 4/2023  Cardiomyopathy with recovered EF: Echo 4/2023 LVEF 60-65% improved from mildly reduced EF 45%  PAC and PVC on ECG 1 year ago: Patient denies palpitations or other red flag symptoms, regular rhythm and rate without premature beats noted on exam, discussed finding is quite benign no further rhythm monitoring necessary at this time    Plan:   Schedule echocardiogram in 2 months for monitoring of atrial myxoma recurrence  Continue frequent exercise and healthy lifestyle        Follow up in 1 year or sooner as needed     History of Present Illness/Subjective    HPI: Torrie Meyer is a 59 year old female with PMHx of left atrial myxoma status postresection, paroxysmal atrial fibrillation status post PVI and HEATHER ligation, hypothyroidism,  presents for follow up.      Torrie is feeling very well, she is an avid runner most days of the week.  She denies any chest discomfort, shortness of breath, palpitations or lower extremity swelling, lightheadedness.  Asked about need for monitoring with history of atrial myxoma, noted some irregular beats found on the past EKG that she wonders about.      Echocardiogram 4/2023 Results:  Left ventricular size, wall motion and function are normal. The ejection  fraction is 60-65%.  Normal right ventricle size and systolic function.  The left atrium is moderate to severely dilated.  No left atrial mass or thrombus visualized.  There is trace to mild mitral regurgitation.    Coronary angiogram 8/2022  57-year-old female with recent diagnosis of atrial myxoma, here for preoperative coronary angiography.     Coronary angiography showed no coronary artery disease with minimal  "atherosclerosis and a right dominant coronary system.     Calcified mass was noted on fluoroscopy, which likely represented the atrial myxoma     Physical Examination  Review of Systems   Vitals: /64 (BP Location: Left arm, Patient Position: Sitting, Cuff Size: Adult Regular)   Pulse 54   Resp 16   Ht 1.549 m (5' 1\")   Wt 60.3 kg (133 lb)   BMI 25.13 kg/m    BMI= Body mass index is 25.13 kg/m .  Wt Readings from Last 3 Encounters:   02/22/24 60.3 kg (133 lb)   04/28/23 61.2 kg (135 lb)   10/13/22 58.5 kg (129 lb)           ENT/Mouth: membranes moist, no oral lesions or bleeding gums.      EYES:  no scleral icterus, normal conjunctivae       Chest/Lungs:   lungs are clear to auscultation, no rales or wheezing, equal chest wall expansion    Cardiovascular:   Regular. Normal first and second heart sounds with no murmurs, rubs, or gallops; the radial and posterior tibial pulses are intact, absent edema bilaterally        Extremities: no cyanosis or clubbing   Skin: no xanthelasma, warm.    Neurologic: no tremors     Psychiatric: alert and oriented x3, calm        Please refer above for cardiac ROS details.        Medical History  Surgical History Family History Social History   No past medical history on file.  Past Surgical History:   Procedure Laterality Date    CV CORONARY ANGIOGRAM N/A 8/25/2022    Procedure: Coronary Angiogram;  Surgeon: Najma Pinedo MD;  Location: [facility] CATH LAB CV    MITRAL VALVE REPAIR N/A 8/29/2022    Procedure: LEFT ATRIAL MXYOMA REMOVAL, ANESTHESIA TRANSESOPHAGEAL ECHCARDIOGRAM,BILATERAL PULMONARY VEIN ISOLATION, EPIAORTIC ULTRASOUND;  Surgeon: Jeniffer Loredo MD;  Location: [facility] OR    OR LIGATION, LEFT ATRIAL APPENDAGE Left 8/29/2022    Procedure: REMOVAL LEFT ATRIAL APPENDAGE;  Surgeon: Jneiffer Loredo MD;  Location: [facility] OR     Family History   Problem Relation Age of Onset    Cancer Mother         stomach    Cancer Father         prostate    No " Known Problems Sister     No Known Problems Daughter     No Known Problems Maternal Grandmother     No Known Problems Maternal Grandfather     No Known Problems Paternal Grandmother     No Known Problems Paternal Grandfather     No Known Problems Maternal Aunt     No Known Problems Paternal Aunt     Hereditary Breast and Ovarian Cancer Syndrome No family hx of     Breast Cancer No family hx of     Colon Cancer No family hx of     Endometrial Cancer No family hx of     Ovarian Cancer No family hx of         Social History     Socioeconomic History    Marital status: Single     Spouse name: Not on file    Number of children: Not on file    Years of education: Not on file    Highest education level: Not on file   Occupational History    Not on file   Tobacco Use    Smoking status: Never    Smokeless tobacco: Never   Substance and Sexual Activity    Alcohol use: Not on file    Drug use: Not on file    Sexual activity: Not on file   Other Topics Concern    Not on file   Social History Narrative    Not on file     Social Determinants of Health     Financial Resource Strain: Not on file   Food Insecurity: Not on file   Transportation Needs: Not on file   Physical Activity: Not on file   Stress: Not on file   Social Connections: Not on file   Interpersonal Safety: Not on file   Housing Stability: Not on file           Medications  Allergies   Current Outpatient Medications   Medication Sig Dispense Refill    acetaminophen (TYLENOL) 325 MG tablet Take 2 tablets (650 mg) by mouth every 4 hours as needed for other, headaches or fever (For optimal non-opioid multimodal pain management to improve pain control.) 30 tablet 0    alendronate (FOSAMAX) 70 MG tablet TAKE 1 TABLET BY MOUTH 1 TIME A WEEK      aspirin (ASPIRIN LOW DOSE) 81 MG EC tablet TAKE 1 TABLET(81 MG) BY MOUTH DAILY 90 tablet 0    cyanocobalamin (VITAMIN B-12) 1000 MCG tablet Take 1,000 mcg by mouth daily      fish oil-omega-3 fatty acids 1000 MG capsule Take 1 g by  "mouth daily      hydrocortisone, Perianal, (ANUSOL-HC) 2.5 % cream Place rectally as needed      levothyroxine (SYNTHROID/LEVOTHROID) 175 MCG tablet Take 175 mcg by mouth daily      multivitamin w/minerals (THERA-VIT-M) tablet Take 1 tablet by mouth daily      pantoprazole (PROTONIX) 40 MG EC tablet Take 1 tablet (40 mg) by mouth daily (Patient taking differently: Take 20 mg by mouth daily) 30 tablet 0    vitamin B-Complex Take 1 tablet by mouth daily      vitamin C (ASCORBIC ACID) 250 MG tablet Take 250 mg by mouth daily      Vitamin D3 (CHOLECALCIFEROL) 25 mcg (1000 units) tablet Take 1 tablet by mouth daily      Lidocaine (LIDOCARE) 4 % Patch Place 2 patches onto the skin every 24 hours To prevent lidocaine toxicity, patient should be patch free for 12 hrs daily. Apply 1 patch to each side of the sternal incision for 12 hours and remove for 12 hours. (Patient not taking: Reported on 4/28/2023) 20 patch 0     No Known Allergies       Lab Results    Chemistry/lipid CBC Cardiac Enzymes/BNP/TSH/INR   Recent Labs   Lab Test 05/05/23  1538   CHOL 192   HDL 71   *   TRIG 46     Recent Labs   Lab Test 05/05/23  1538 08/23/22  1802 07/23/20  1038   * 112 83     Recent Labs   Lab Test 05/05/23  1538      POTASSIUM 3.8   CHLORIDE 104   CO2 22   GLC 91   BUN 13.9   CR 0.72   GFRESTIMATED >90   YEVGENIY 9.7     Recent Labs   Lab Test 05/05/23  1538 10/28/22  1737 09/19/22  1518   CR 0.72 0.6 0.69     Recent Labs   Lab Test 08/24/22  1437   A1C 4.9          Recent Labs   Lab Test 09/02/22  0502   WBC 8.4   HGB 8.0*   HCT 22.9*   MCV 95   *     Recent Labs   Lab Test 09/02/22  0502 08/30/22  0510 08/29/22  2303   HGB 8.0* 9.2* 10.0*    No results for input(s): \"TROPONINI\" in the last 43332 hours.  No results for input(s): \"BNP\", \"NTBNPI\", \"NTBNP\" in the last 41870 hours.  Recent Labs   Lab Test 05/05/23  1538   TSH 0.44     Recent Labs   Lab Test 08/29/22  1645 08/29/22  1533 08/26/22  0641   INR 1.39* " 1.44* 1.12          This note has been dictated using voice recognition software. Any grammatical, typographical, or context distortions are unintentional and inherent to the software    Josy Kuhn PA-C

## 2024-03-15 ENCOUNTER — LAB REQUISITION (OUTPATIENT)
Dept: LAB | Facility: CLINIC | Age: 59
End: 2024-03-15

## 2024-03-15 ENCOUNTER — MEDICAL CORRESPONDENCE (OUTPATIENT)
Dept: SCHEDULING | Facility: CLINIC | Age: 59
End: 2024-03-15
Payer: COMMERCIAL

## 2024-03-15 DIAGNOSIS — M81.0 AGE-RELATED OSTEOPOROSIS WITHOUT CURRENT PATHOLOGICAL FRACTURE: ICD-10-CM

## 2024-03-15 DIAGNOSIS — E03.9 HYPOTHYROIDISM, UNSPECIFIED: ICD-10-CM

## 2024-03-15 PROCEDURE — 82306 VITAMIN D 25 HYDROXY: CPT | Performed by: FAMILY MEDICINE

## 2024-03-15 PROCEDURE — 84443 ASSAY THYROID STIM HORMONE: CPT | Performed by: FAMILY MEDICINE

## 2024-03-15 NOTE — PROGRESS NOTES
[facility]    Medicine Progress Note - Hospitalist Service    Date of Admission:  8/23/2022    Assessment & Plan          #Torrie Meyer is a 57 year old female PMH of hypothyroidism, A. fib with slow ventricular response, admitted on 8/23/2022.     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  Associated thrombus could not be excluded on echo from 8/18/2022.  Coronary angio on 8/25 showed very mild RCA d-se.  CV surgery following and plan on surgery on Monday with Dr. Loredo.    #Cardiomyopathy, likely nonischemic, with decreased LVEF at 40 to 45%, on echo 8/23/2022.  MARII on 8/24 as above.    #Atrial fibrillation with slow ventricular response in 40-50s.  Patient asymptomatic/fibrillation was found accidentally during pre-colonoscopy evaluation.  First-time seen in the clinic by cardiology on 8/18/2022, Dr. Sawant.  [id number] score is 1 due to gender, thus very low risk of stroke.  Baby aspirin recommended by cardiology.  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    #Hypothyroidism, on replacement with levothyroxine.  Most recent TSH 78.8 on 8/17/2022; normal free T4.    #Low normal potassium was 3.5, magnesium 1.7 on admission.  Replacing and monitoring electrolytes..  Cholesterol 187, HDL 62, .    Post cath hypotension. Briefly SBP in 70's. Pt asymptomatic. Hypotension recovered, to 90's 250 ml NS bolus given Pt was seen.    Plan:  Continue telemetry monitoring  Anticoagulation with heparin drip started 8/24, to decrease cardioembolic events, as recommended by cardiology.  Patient was consulted by cardiothoracic surgeon, with plans for surgery on Monday.  Aspirin discontinued per cardiology.  Continue home dose of levothyroxine.  Patient of   Reginaldo.  Monitor and replace electrolytes.  Am lipids.     Diet: NPO for Medical/Clinical Reasons Except for: Meds  Regular Diet Adult    DVT Prophylaxis: Heparin drip  Arreaga Catheter: Not present  Central Lines: None  Cardiac Monitoring: ACTIVE order. Indication: QTc prolonging medication (48 hours)  Code Status: Full Code      Disposition Plan      Expected Discharge Date: 08/27/2022      Destination: home with family     Anticipate prolonged hospitalization, due to cardiothoracic surgery expected on 8/29/2023.     The patient's care was discussed with the Bedside Nurse, Patient and Dr. Ibanez. Consultant.    Katerin Mendosa MD  Hospitalist Service  [facility]  Securely message with the Vocera Web Console (learn more here)  Text page via [employer] Paging/Directory     Clinically Significant Risk Factors Present on Admission        _______________________________________________________________    Interval History   Think it overnight.  Remains bradycardic with heart rate in low 50s.  Broad-spectrum recognizes chest pressure, at rest and exertional, with associated dyspnea.  No abdominal pain, nausea, vomiting, cough, dysuria.    Data reviewed today: I reviewed all medications, new labs and imaging results over the last 24 hours. I personally reviewed    Physical Exam   Vital Signs: Temp: 98  F (36.7  C) Temp src: Oral BP: 97/59 Pulse: 63   Resp: (!) 55 SpO2: 96 % O2 Device: None (Room air) Oxygen Delivery: 2 LPM  Weight: 131 lbs 8 oz  General: Alert and oriented x 3. Not in obvious distress.  HEENT: NC, AT. Neck- supple, No JVP elevation, lymphadenopathy or thyromegaly. Trachea-central.  Chest: Clear to auscultation bilaterally.  Heart: distant S1S2 irreg irregular. No M/R/G.  Abdomen: Soft. NT, ND. No organomegaly. Bowel sounds- active.  Back: No spine tenderness. No CVA tenderness.  Extremities: No leg swelling. Peripheral pulses 2+ bilaterally.  Neuro: Cranial nerves 1-12 grossly normal.  No focal neurological deficit    Data   Recent Labs   Lab 22  0353 22  1437 22  1802   WBC  --  8.3 5.8   HGB  --  15.5 14.3   MCV  --  96 95   PLT  --  209 206     --  141   POTASSIUM 4.0  --  3.5   CHLORIDE 108*  --  107   CO2 25  --  26   BUN 11  --  18   CR 0.69  --  0.85   ANIONGAP 7  --  8   YEVGENIY 8.7  --  9.5   GLC 94  --  79     Recent Results (from the past 24 hour(s))   Echocardiogram MARII    Narrative    [id number]  [id number]  [id number]  [id number]^JOHN^TIM^[place] [address]     Name: LUISANA BYERS  MRN: [id number]  : 1965  Study Date: 2022 10:54 AM  Age: 57 yrs  Gender: Female  Patient [facility]: [facility]  Reason For Study: Atrial Mass  Ordering Physician: TIM LOPEZ  Performed By: /NOEMÍ     BSA: 1.6 m2  Height: 61 in  Weight: 132 lb  HR: 65  ______________________________________________________________________________  Procedure  Complete MARII Adult. 3D image acquisition, reconstruction, and real-time  interpretation was performed.  ______________________________________________________________________________  Interpretation Summary     TRANSESOPHAGEAL ECHOCARDIOGRAM     1. The left ventricle is normal in size. Left ventricular systolic performance  is mild to moderately reduced. The ejection fraction is estimated to be 40-  45%.  2. There is mild to moderate global reduction in left ventricular systolic  performance.  3. There is a 3 x 4 x 4.5 cm mass in the left atrium attached to the atrial  septum. The echocardiographic features and anatomic location are paradigmatic  of an atrial myxoma. The mass moves forward in diastole abutting the mitral  valve leaflets and extends through the mitral annular valve plane.  4. Mild right ventricular enlargement with low normal right ventricular  systolic performance.  5. There is moderate left atrial enlargement. There is mild to moderate right  atrial enlargement.  6. No  "thrombus is detected within the left atrial appendage.  7. Color Doppler interrogation the atrial septum suggest the presence of a  small patent foramen ovale (PFO). No right to left shunting, however, is  detected with echo contrast (\"bubble\") study.  ______________________________________________________________________________  Left ventricle:  The left ventricle is normal in size. Left ventricular systolic performance is  mild to moderately reduced. The ejection fraction is estimated to be 40-45%.  There is mild to moderate global reduction in left ventricular systolic  performance.     Right ventricle:  Mild right ventricular enlargement with low normal right ventricular systolic  performance.     Left atrium:  There is moderate left atrial enlargement.     Right atrium:  There is mild to moderate right atrial enlargement.     Aortic valve:  The aortic valve is comprised of three cusps. No significant aortic stenosis  or aortic insufficiency is detected on this study.     Mitral valve:  There is mild mitral annular calcification. There is mild mitral  insufficiency.     Tricuspid valve:  The tricuspid valve is grossly morphologically normal. There is trace-mild  tricuspid insufficiency.     Pulmonic valve:  The pulmonic valve is grossly morphologically normal.     Thoracic aorta:  The aortic root and proximal ascending aorta are of normal dimension.     Pericardium:  There is no significant pericardial effusion.  ______________________________________________________________________________  The study was performed using a multiplane adult transducer. 2-D, colorflow  Doppler, and spectral Doppler analysis was performed. Informed consent was  obtained prior to the procedure. The patient was assessed upon my arrival to  the procedure room. The patient was felt to be an appropriate candidate for  the procedure and planned sedation. Sedation: fentanyl 100 mcg & versed 1.5  mg  iv. Topical anesthesia was performed " with viscous lidocaine and benzocaine  spray. The transducer was introduced without difficulty. Heart rate,  respiratory rate, oxygen saturations, blood pressure, and response to care  were monitored throughout the procedure with nursing assistance. There were no  complications of the procedure. Total sedation time: 26 minutes of continuous  bedside 1:1 monitoring.  ______________________________________________________________________________  MARII  Patient was sedated using Versed 1.5 mg. The heart rate, respiratory rate,  oxygen saturations, blood pressure, and response to care were monitored  throughout the procedure with the assistance of the nurse. Patient was sedated  using Fentanyl 100 mcg. 15 ml viscous Lidocaine, 9 sprays of Benzocaine. I  determined this patient to be an appropriate candidate for the planned  sedation and procedure and have reassessed the patient immediately prior to  sedation and procedure. Total sedation time: 26 minutes of continuous bedside  1:1 monitoring.  ______________________________________________________________________________  Doppler Measurements & Calculations  MV max P.7 mmHg  MV mean P.5 mmHg  MV V2 VTI: 27.5 cm  TR max melvin: 229.0 cm/sec  TR max P.0 mmHg     ______________________________________________________________________________  Report approved by: Luis Jarrett 2022 01:45 PM         US Carotid Bilateral    Narrative    EXAM: US CAROTID BILATERAL  LOCATION: [facility]  DATE/TIME: 2022 3:33 PM    INDICATION: Coronary arterial disease. Pre op cardiac surgery.  COMPARISON: None.  TECHNIQUE: Duplex exam performed utilizing 2D gray-scale imaging, Doppler interrogation with color-flow and spectral waveform analysis. The percent diameter stenosis is determined using NASCET criteria and Society of Radiologists in Ultrasound Consensus   Criteria.    FINDINGS:    RIGHT: Mild plaque at the bifurcation. The peak  systolic velocity in the ICA is less than 125 cm/sec, consistent with less than 50% stenosis. Normal velocities in the ECA. Antegrade flow within the vertebral artery.     LEFT: Mild plaque at the bifurcation. The peak systolic velocity in the ICA is less than 125 cm/sec, consistent with less than 50% stenosis. Normal velocities in the ECA. Antegrade flow within the vertebral artery.    VELOCITY CHART:  CCA   Right: 43.6/16.7 cm/s   Left: 50.8/20.1 cm/s  ICA   Right: 52.2/29.6 cm/s   Left: 46.2/27.9 cm/s  ECA   Right: 54.7/32.0 cm/s   Left: 47.8/5.88 cm/s  ICA/CCA PSV Ratio   Right: 1.2   Left: 0.9      Impression    IMPRESSION:  1.  Mild plaque formation, velocities consistent with less than 50% stenosis in the right internal carotid artery.  2.  Mild plaque formation, velocities consistent with less than 50% stenosis in the left internal carotid artery.  3.  Flow within the vertebral arteries is antegrade.   XR Chest 2 Views    Narrative    EXAM: XR CHEST 2 VIEWS  LOCATION: [facility]  DATE/TIME: 8/24/2022 3:44 PM    INDICATION: pre op cardiac surgery  COMPARISON: None.      Impression    IMPRESSION: Right posterior costophrenic angle is clipped on the lateral view. Lungs are clear. Heart and pulmonary vascularity are normal. No signs of acute disease.     Medications     heparin 700 Units/hr (08/24/22 2024)     - MEDICATION INSTRUCTIONS -       sodium chloride 150 mL/hr at 08/25/22 0830       cyanocobalamin  1,000 mcg Oral Daily     levothyroxine  175 mcg Oral QAM AC     magnesium oxide  400 mg Oral BID     sodium chloride (PF)  3 mL Intracatheter Q8H     traZODone  50 mg Oral At Bedtime      Rosa

## 2024-03-16 LAB
TSH SERPL DL<=0.005 MIU/L-ACNC: 1.55 UIU/ML (ref 0.3–4.2)
VIT D+METAB SERPL-MCNC: 110 NG/ML (ref 20–50)

## 2024-03-18 DIAGNOSIS — I48.91 ATRIAL FIBRILLATION WITH SLOW VENTRICULAR RESPONSE (H): ICD-10-CM

## 2024-03-19 RX ORDER — ASPIRIN 81 MG/1
81 TABLET, COATED ORAL DAILY
Qty: 90 TABLET | Refills: 2 | Status: SHIPPED | OUTPATIENT
Start: 2024-03-19

## 2024-03-25 ENCOUNTER — HOSPITAL ENCOUNTER (OUTPATIENT)
Dept: BONE DENSITY | Facility: HOSPITAL | Age: 59
Discharge: HOME | End: 2024-03-25
Attending: FAMILY MEDICINE | Admitting: FAMILY MEDICINE
Payer: COMMERCIAL

## 2024-03-25 DIAGNOSIS — M81.0 OSTEOPOROSIS, UNSPECIFIED OSTEOPOROSIS TYPE, UNSPECIFIED PATHOLOGICAL FRACTURE PRESENCE: ICD-10-CM

## 2024-03-25 PROCEDURE — 77089 TBS DXA CAL W/I&R FX RISK: CPT

## 2024-04-11 ENCOUNTER — HOSPITAL ENCOUNTER (OUTPATIENT)
Dept: CARDIOLOGY | Facility: HOSPITAL | Age: 59
Discharge: HOME | End: 2024-04-11
Payer: COMMERCIAL

## 2024-04-11 DIAGNOSIS — Z86.018 HISTORY OF ATRIAL MYXOMA: ICD-10-CM

## 2024-04-11 DIAGNOSIS — I42.8 NONISCHEMIC CARDIOMYOPATHY (H): ICD-10-CM

## 2024-04-11 LAB — LVEF ECHO: NORMAL

## 2024-04-11 PROCEDURE — 93306 TTE W/DOPPLER COMPLETE: CPT

## 2024-04-11 PROCEDURE — 93306 TTE W/DOPPLER COMPLETE: CPT | Mod: 26 | Performed by: INTERNAL MEDICINE

## 2024-06-11 NOTE — PATIENT INSTRUCTIONS
Sclerotherapy Information    [facility]-  Dr. Eunice Song   Phone: [phone]    [place] Location  [address] [place], [place], [address]    [place] Location  [facility]   [address], [address]  [place], [place], [address]                                             Sclerotherapy      The most common way to treat spider and small varicose veins is sclerotherapy. This is a simple office procedure. After treatment, you can return to your daily activities right away. For best results, some veins may need to be treated more than once.    What is sclerotherapy?    Sclerotherapy is a treatment to get rid of varicose veins and spider veins. A chemical called a sclerosant is injected into the vein. This causes the vein to close.    The procedure may cause some pain. The chemical may cause burning or cramping for a few minutes at the injection site.    The procedure may take about 15 to 60 minutes. It depends on how many veins are treated and how big they are. You should be able to walk on your own after the treatment.      Common Side Effects:    Itching  The areas that were injected may itch. This is usually mild and lasts less than a day. Do not use lotions or creams on your legs until the injection sites have healed over.    Pain  It is common to have some tenderness at the injection sites. Injection of the solution can be uncomfortable, but is usually well tolerated by most patients. The tenderness is temporary, lasting 24 hours at most. Tylenol or Ibuprofen can be used, if needed, following the product directions.  Bruising  This may occur at the injections sites. Bruising may be minimized by avoiding Aspirin and Ibuprofen products for five days before each treatment session.  Hyperpigmentation  A light brown discoloration of the skin may develop along the veins in the areas injected. Approximately 20-30% of patients treated note the discoloration (which is  lighter and less obvious than the veins that are being treated). The hyperpigmentation usually fades in a couple of weeks, but may take several months to a year to totally resolve. There is a 1% chance of hyperpigmentation continuing after one year.    Trapped blood  A small amount of blood may become trapped and hardened in the veins. This may feel like a knot or cord and it may look dark blue or bruised. This is a common occurrence. You may need to return before your next treatment so this area can be drained to remove the trapped blood. This will reduce the hyperpigmentation that can occur. The chance of this occurring can be decreased with proper use of compression hose after your treatment.    Matting  Matting is the formation of new, fine  spider  veins in the area injected.  It occurs in approximately 10% of patients injected. The exact reason for this is unknown. If untreated, the matting usually resolves in 3 to 12 months, but very rarely, it can be permanent. If the matting does not fade, it can be re-injected.    Rare Side Effects:    Ulceration at injection sites  Very rarely, a small ulcer will occur at the site where a vein is injected. An ulcer can take 4 to 6 weeks to completely heal. A small scar may result.    Allergic reactions  There is a very rare incidence of an allergic reaction to the solution injected. You will be observed for such reaction and will be treated appropriately should it occur. Please inform us of any allergy history.    Pulmonary embolus/Deep vein thrombosis    This is a blood clot which moves to the lungs/a blood clot in the deep vein system. There is an extraordinarily low incidence of this complication.    Sclerotherapy: Pre-Treatment Instructions     Recommended Sessions: this will be determined after meeting the physician     Pricing: Full session - $407 -Payment is due at the time of visit following the treatment    Time Required per Treatment Session -About 45 minutes       Please come in 15 minutes before your scheduled appointment. 30 min. Sclerotherapy treatments last approximately 30 minutes. 5 min. A staff member will wipe your legs off with warm water and dry them with a wash cloth. Then you can put your compression hose on, get dressed and check out. 10 min. After your treatment, you will be asked to walk around for 10 minutes before you get in your car.    Medications    Five days before your appointment, discontinue aspirin (Bufferin, Anacin, etc.) and Ibuprofen (Motrin, Advil, Aleve, etc.) to reduce bruising. Resume these medications the day following the treatment.    Leg Preparation    Do not shave your legs or apply any oil, lotion or powder the night before or the day of your treatment.    Clothing    Shorts: Bring a pair of loose, comfortable shorts to wear during your treatment (or you can choose to wear ours). Shoes: Bring comfortable shoes to accommodate the compression hose after your treatment. Do not wear flip-flops or thong-style sandals unless you have open-toe compression hose.    Photographs    Photos will be taken before each treatment. This helps monitor your progress.    Injections    The physician will inject your veins with the sclerotherapy solution chosen to meet your specific needs.    Compression Hose    Please bring your compression hose if you have them. They may also be reserved for you at our clinic. Compression hose must be worn immediately after each sclerotherapy treatment. The hose must be compression level 20-30, and they must be worn for 24 hours straight after your treatment. If you have never worn compression hose before, a staff member will teach you how to put them on.    You cannot have a treatment without compression hose. They are critical to the success of your treatment!    You may purchase your compression hose from us. We will measure you and have the hose available when you come for your treatment.    Cancellation and  Rescheduling    If you need to cancel or reschedule your sclerotherapy treatment, please give our office at least 24 hour notice. Thank you.    SCLEROTHERAPY AFTER CARE    Immediately:  After treatment, walk for 10-15 minutes before getting in your car.  If your trip home is more than 1 hour, stop and walk around for 5-10 minutes. Avoid sitting or standing for extended periods.     First 24 hours: Wear your compression continuously, even while in bed. After the 24 hours, you may shower if you want to. Put your hose back on, unless you are going to bed. You should NOT wear compression to bed after the first 24 hours. You may fly the next day, but wear your compression.     For 5 days: Wear the compression hose for waking hours only. You may continue to wear them longer than 5 days if you prefer.     For days 5-7: Walking is encouraged, as it promotes efficient circulation in your veins. You may do activities that raise your heart rate, but do NOT run, jog, do high impact aerobics, or weight lifting. After 7 days, no activity restrictions.    Shaving: Wait a few days to shave or apply lotion.     Bathing: Do NOT take hot baths or sit in a hot tub for 7-10 days.      For 1 year: Wear SPF 30 sunscreen on your legs when in the sun. This is very important! It helps prevent darkening of the skin at the injection sites.     Medications: You may resume your usual medications, including aspirin or ibuprofen.                                                Common Things to Expect       Compression must be worn for the first 24 hours and then during the day for 5-7 days.    If larger veins are treated with ultrasound-guided sclerotherapy, you will have redness, firmness, tenderness, and swelling.  This firmness and tenderness may take 3-6 months to resolve. Ibuprofen and compression hose will aid in this process.    You will have bruising that can last up to 3 weeks. Most fading of the veins will occur between 3 and 6 weeks  after treatment.    You may notice brown discoloration (hyperpigmentation) at the treatment site.  This should fade with time, but will take 3 months to 1 year to fully heal.     Some treated veins may look darker because of trapped blood within the vein. This trapped blood can be removed at a minimum of 1 month following treatment. Larger veins are more likely to develop trapped blood.    It is very important for you to use at least SPF 30 sunscreen in order to help prevent the discoloration of your skin.    Migraines rarely occur following sclerotherapy, but are more likely in patients with a history of migraines.  Treat as you would any other migraine.    [facility]  [address], [address]  [place] [address]  Phone: [phone]      [facility]  [address] [place] [id number]  Phone: [phone]  Fax: [phone]  Fax: [phone]

## 2024-06-11 NOTE — PROGRESS NOTES
[facility]        Patient is here for a consult. Does not ear compression stockings.     Varicose veins LLE. Last seen 2/2022. Referred for laser treatment or sclerotherapy treatment of bilateral spider veins. Hx RFA bilateral GSV       Pt is currently taking Aspirin.    There were no vitals taken for this visit.    The provider has been notified that the patient has no concerns.     Questions patient would like addressed today are: N/A.    Refills are needed: N/A    Has homecare services and agency name:  No

## 2024-06-12 ENCOUNTER — OFFICE VISIT (OUTPATIENT)
Dept: VASCULAR SURGERY | Facility: CLINIC | Age: 59
End: 2024-06-12
Attending: SPECIALIST
Payer: COMMERCIAL

## 2024-06-12 VITALS
DIASTOLIC BLOOD PRESSURE: 78 MMHG | HEART RATE: 50 BPM | RESPIRATION RATE: 14 BRPM | SYSTOLIC BLOOD PRESSURE: 126 MMHG | OXYGEN SATURATION: 99 %

## 2024-06-12 DIAGNOSIS — I83.893 SYMPTOMATIC VARICOSE VEINS OF BOTH LOWER EXTREMITIES: Primary | ICD-10-CM

## 2024-06-12 PROCEDURE — G0463 HOSPITAL OUTPT CLINIC VISIT: HCPCS | Performed by: SPECIALIST

## 2024-06-12 PROCEDURE — 99243 OFF/OP CNSLTJ NEW/EST LOW 30: CPT | Performed by: SPECIALIST

## 2024-06-12 ASSESSMENT — PAIN SCALES - GENERAL: PAINLEVEL: NO PAIN (0)

## 2024-07-08 NOTE — PROGRESS NOTES
[facility] Vein Consult      Assessment:     1. varicose veins, bilateral   2. spider veins, bilateral     Plan:     1. Treatment options of conservative therapy of stockings use, exercise, weight loss, elevating legs when possible.    2. Script for compression stockings 20-30 mm hg  3. Ultrasound to evaluate legs for incompetency of both deep and superficial system .   4. Surgical treatment, discussed briefly today.  5. Follow up: 3 months.   6. Call for any questions concerns or issues    Subjective:      Torrie Meyer is a 59 year old female  who was referred by Frank Hair  for evaluation of varicose veins. Symptoms include pain, aching, fatigue, burning, edema, and dermatitis. Patient has history of leg selling, pain and vein issues that have progressed. Pain and symptoms have affected daily living and work activities needing medications. Here for evaluation today. no stocking or compression devic use    Allergies:Patient has no known allergies.    History reviewed. No pertinent past medical history.    Past Surgical History:   Procedure Laterality Date    CV CORONARY ANGIOGRAM N/A 8/25/2022    Procedure: Coronary Angiogram;  Surgeon: Najma Pinedo MD;  Location: [facility] CATH LAB CV    MITRAL VALVE REPAIR N/A 8/29/2022    Procedure: LEFT ATRIAL MXYOMA REMOVAL, ANESTHESIA TRANSESOPHAGEAL ECHCARDIOGRAM,BILATERAL PULMONARY VEIN ISOLATION, EPIAORTIC ULTRASOUND;  Surgeon: Jeniffer Loredo MD;  Location: [facility] OR    OR LIGATION, LEFT ATRIAL APPENDAGE Left 8/29/2022    Procedure: REMOVAL LEFT ATRIAL APPENDAGE;  Surgeon: Jeniffer Loredo MD;  Location: [facility] OR         Current Outpatient Medications:     acetaminophen (TYLENOL) 325 MG tablet, Take 2 tablets (650 mg) by mouth every 4 hours as needed for other, headaches or fever (For optimal non-opioid multimodal pain management to improve pain control.), Disp: 30 tablet, Rfl: 0    alendronate (FOSAMAX) 70 MG tablet, TAKE  1 TABLET BY MOUTH 1 TIME A WEEK, Disp: , Rfl:     aspirin (ASPIRIN LOW DOSE) 81 MG EC tablet, TAKE 1 TABLET(81 MG) BY MOUTH DAILY, Disp: 90 tablet, Rfl: 2    cyanocobalamin (VITAMIN B-12) 1000 MCG tablet, Take 1,000 mcg by mouth daily, Disp: , Rfl:     fish oil-omega-3 fatty acids 1000 MG capsule, Take 1 g by mouth daily, Disp: , Rfl:     hydrocortisone, Perianal, (ANUSOL-HC) 2.5 % cream, Place rectally as needed, Disp: , Rfl:     levothyroxine (SYNTHROID/LEVOTHROID) 175 MCG tablet, Take 175 mcg by mouth daily, Disp: , Rfl:     multivitamin w/minerals (THERA-VIT-M) tablet, Take 1 tablet by mouth daily, Disp: , Rfl:     pantoprazole (PROTONIX) 40 MG EC tablet, Take 1 tablet (40 mg) by mouth daily (Patient taking differently: Take 20 mg by mouth daily), Disp: 30 tablet, Rfl: 0    vitamin B-Complex, Take 1 tablet by mouth daily, Disp: , Rfl:     vitamin C (ASCORBIC ACID) 250 MG tablet, Take 250 mg by mouth daily, Disp: , Rfl:     Vitamin D3 (CHOLECALCIFEROL) 25 mcg (1000 units) tablet, Take 1 tablet by mouth daily, Disp: , Rfl:     Lidocaine (LIDOCARE) 4 % Patch, Place 2 patches onto the skin every 24 hours To prevent lidocaine toxicity, patient should be patch free for 12 hrs daily. Apply 1 patch to each side of the sternal incision for 12 hours and remove for 12 hours. (Patient not taking: Reported on 4/28/2023), Disp: 20 patch, Rfl: 0     Family History   Problem Relation Age of Onset    Cancer Mother         stomach    Cancer Father         prostate    No Known Problems Sister     No Known Problems Daughter     No Known Problems Maternal Grandmother     No Known Problems Maternal Grandfather     No Known Problems Paternal Grandmother     No Known Problems Paternal Grandfather     No Known Problems Maternal Aunt     No Known Problems Paternal Aunt     Hereditary Breast and Ovarian Cancer Syndrome No family hx of     Breast Cancer No family hx of     Colon Cancer No family hx of     Endometrial Cancer No family hx of      Ovarian Cancer No family hx of         reports that she has never smoked. She has never used smokeless tobacco.      Review of Systems:    Pertinent items are noted in HPI.  Patient has symptomatic veins and changes of bilateral legs. These have progressed to the point of causing symptoms on a daily basis. This causes issues with daily activities and chores such as washing dishes, vacuuming, outdoor upkeep, and standing for long lengths of time       Objective:     Vitals:    06/12/24 1025   BP: 126/78   Pulse: 50   Resp: 14   SpO2: 99%     There is no height or weight on file to calculate BMI.    EXAM:  GENERAL: This is a well-developed 59 year old female who appears her stated age  HEAD: normocephalic  HEENT: Pupils equal and reactive bilaterally  MOUTH: mucus membranes intact. Normal dentation  CARDIAC: RRR without murmur  CHEST/LUNG:  Clear to auscultation bilaterally  ABDOMEN: Soft, nontender, nondistended, no masses noted   NEUROLOGIC: Focally intact, nonfocal, alert and oriented x 3  INTEGUMENT: No open lesions or ulcers  VASCULAR: Pulses intact, symmetrical upper and lower extremities. There areskin changes consistent with chronic venous insufficiency. Varicose veins present in bilateral greater saphenous distribution. Spider veins present bilateral.                            Side:: Bilateral  VCSS  PAIN:: Absent: None  Varicose Veins:: Mild: Few scattered  Venous Edema:: Absent: None  Skin Pigmentation:: Absent: None  Inflamation:: Absent: None  Induration:: Absent: None  Number of active ulcers:: 0  Active ulcer duration:: None  Active ulcer diameter:: None  Compression Therapy:: Not used or patient not compliant  VCSS Score:: 1  CEAP:: Superficial spider veins (reticular veins) only    Imaging:    pending    Marcelino Dueñas MD  General Surgery [phone]  Vascular Surgery [phone]

## 2024-08-11 ENCOUNTER — HEALTH MAINTENANCE LETTER (OUTPATIENT)
Age: 59
End: 2024-08-11

## 2024-11-27 ENCOUNTER — LAB REQUISITION (OUTPATIENT)
Dept: LAB | Facility: CLINIC | Age: 59
End: 2024-11-27

## 2024-11-27 DIAGNOSIS — N89.8 OTHER SPECIFIED NONINFLAMMATORY DISORDERS OF VAGINA: ICD-10-CM

## 2024-11-27 DIAGNOSIS — Z00.00 ENCOUNTER FOR GENERAL ADULT MEDICAL EXAMINATION WITHOUT ABNORMAL FINDINGS: ICD-10-CM

## 2024-11-27 LAB
BACTERIAL VAGINOSIS VAG-IMP: NEGATIVE
CANDIDA DNA VAG QL NAA+PROBE: NOT DETECTED
CANDIDA GLABRATA / CANDIDA KRUSEI DNA: NOT DETECTED
T VAGINALIS DNA VAG QL NAA+PROBE: NOT DETECTED

## 2024-11-27 PROCEDURE — 80061 LIPID PANEL: CPT

## 2024-11-27 PROCEDURE — 84443 ASSAY THYROID STIM HORMONE: CPT

## 2024-11-27 PROCEDURE — 80053 COMPREHEN METABOLIC PANEL: CPT

## 2024-11-27 PROCEDURE — 0352U MULTIPLEX VAGINAL PANEL BY PCR: CPT

## 2024-11-28 LAB
ALBUMIN SERPL BCG-MCNC: 4.6 G/DL (ref 3.5–5.2)
ALP SERPL-CCNC: 77 U/L (ref 40–150)
ALT SERPL W P-5'-P-CCNC: 32 U/L (ref 0–50)
ANION GAP SERPL CALCULATED.3IONS-SCNC: 13 MMOL/L (ref 7–15)
AST SERPL W P-5'-P-CCNC: 33 U/L (ref 0–45)
BILIRUB SERPL-MCNC: 0.5 MG/DL
BUN SERPL-MCNC: 12 MG/DL (ref 6–20)
CALCIUM SERPL-MCNC: 10.1 MG/DL (ref 8.8–10.4)
CHLORIDE SERPL-SCNC: 103 MMOL/L (ref 98–107)
CHOLEST SERPL-MCNC: 187 MG/DL
CREAT SERPL-MCNC: 0.7 MG/DL (ref 0.51–0.95)
EGFRCR SERPLBLD CKD-EPI 2021: >90 ML/MIN/1.73M2
FASTING STATUS PATIENT QL REPORTED: ABNORMAL
FASTING STATUS PATIENT QL REPORTED: NORMAL
GLUCOSE SERPL-MCNC: 83 MG/DL (ref 70–99)
HCO3 SERPL-SCNC: 25 MMOL/L (ref 22–29)
HDLC SERPL-MCNC: 71 MG/DL
LDLC SERPL CALC-MCNC: 101 MG/DL
NONHDLC SERPL-MCNC: 116 MG/DL
POTASSIUM SERPL-SCNC: 3.9 MMOL/L (ref 3.4–5.3)
PROT SERPL-MCNC: 6.7 G/DL (ref 6.4–8.3)
SODIUM SERPL-SCNC: 141 MMOL/L (ref 135–145)
TRIGL SERPL-MCNC: 75 MG/DL
TSH SERPL DL<=0.005 MIU/L-ACNC: 1.1 UIU/ML (ref 0.3–4.2)

## 2025-01-30 ENCOUNTER — TELEPHONE (OUTPATIENT)
Dept: CARDIOLOGY | Facility: CLINIC | Age: 60
End: 2025-01-30
Payer: COMMERCIAL

## 2025-01-30 DIAGNOSIS — I48.91 ATRIAL FIBRILLATION WITH SLOW VENTRICULAR RESPONSE (H): Primary | ICD-10-CM

## 2025-01-30 RX ORDER — ASPIRIN 81 MG/1
81 TABLET ORAL DAILY
Qty: 90 TABLET | Refills: 1 | Status: SHIPPED | OUTPATIENT
Start: 2025-01-30

## 2025-02-03 ENCOUNTER — TELEPHONE (OUTPATIENT)
Dept: CARDIOLOGY | Facility: CLINIC | Age: 60
End: 2025-02-03
Payer: COMMERCIAL

## 2025-02-03 NOTE — TELEPHONE ENCOUNTER
Spoke with patient and updated her of 's response. Pt verbalized understanding. Pt asking how to access notes and information for new providers down in [place]. Explained to patient she can access all of the notes and information she needs in [employer] as well as sign release of information to share chart.-hay  _________________  ----- Message -----  From: Anahi Sawant MD  Sent: 2/3/2025  10:59 AM CST  To: Daphne Paez, RN    Sorry I do not know anyone there.  ----- Message -----  From: Daphne Paez RN  Sent: 2/3/2025  10:33 AM CST  To: Anahi Saawnt MD    ----- Message from Daphne Paez RN sent at 2/3/2025 10:33 AM CST -----  Ildefonso Yeh,    Pt asking if you have any recommendations for cardiologists down in [place] by [place] or [place]?  Thanks.  -hay

## 2025-02-03 NOTE — TELEPHONE ENCOUNTER
Spoke with patient, she stated she has established care with a PCP and has a referral for cardiology down in [place]. Pt asking if  would recommend any particular cardiologist down by [place] or [place], [place].-[facility]

## 2025-02-03 NOTE — TELEPHONE ENCOUNTER
[facility] Call Center    Phone Message    May a detailed message be left on voicemail: yes     Reason for Call: Other: Pt would like a call back as she moved to [place] and would like a referral close to her in [place] or [place] , please reach out to pt to discuss     Action Taken: Other: Cardio    Travel Screening: Not Applicable     Date of Service:

## 2025-04-12 ENCOUNTER — HEALTH MAINTENANCE LETTER (OUTPATIENT)
Age: 60
End: 2025-04-12

## (undated) DEVICE — SU PROLENE 5-0 RB-1DA 36"  8556H

## (undated) DEVICE — HANDPIECE ABLATION OPEN LONG JAW LT CURVE  OLL2

## (undated) DEVICE — LEAD PACER MYOCARDIAL BIPOLAR TEMPORARY 53CM 6495F

## (undated) DEVICE — SLEEVE TR BAND RADIAL COMPRESSION DEVICE 24CM TRB24-REG

## (undated) DEVICE — Device

## (undated) DEVICE — PITCHER STERILE 1000ML  SSK9004A

## (undated) DEVICE — SU PLEDGET SOFT TFE 3/8"X3/26"X1/16" PCP40

## (undated) DEVICE — SPONGE LAP 18X18" X8435

## (undated) DEVICE — INTRO MICRO MINI STICK 4FR STIFF NITINOL 45-753

## (undated) DEVICE — SUCTION TIP POOLE STERILE 35040

## (undated) DEVICE — CONNECTOR BLAKE DRAIN SGL

## (undated) DEVICE — HEMOSTASIS BONE OSTENE 2.5G SYNTHETIC 1503832

## (undated) DEVICE — GLOVE BIOGEL PI SZ 6.5 40865

## (undated) DEVICE — CABLE MYO/LEAD PACING BLUE DISP 019-535

## (undated) DEVICE — SU PROLENE 3-0 SHDA 36" 8522H

## (undated) DEVICE — PREP CHLORAPREP 26ML TINTED HI-LITE ORANGE 930815

## (undated) DEVICE — TAPE ADH MEDIPORE 6IN SOFT CLOTH 2866

## (undated) DEVICE — SU STRATAFIX PDS PLUS 0 CT 45CM SXPP1A406

## (undated) DEVICE — GOWN IMPERVIOUS BREATHABLE SMART LG 89015

## (undated) DEVICE — SU UMBILICAL TAPE .125X30" U11T

## (undated) DEVICE — SU PROLENE 4-0 RB-1DA 36" 8557H

## (undated) DEVICE — CUSTOM PACK CV ST JOES SCV5BCVHEA

## (undated) DEVICE — CATH SUCTION 14FR W/O CTRL DYND41962

## (undated) DEVICE — CUSTOM PACK CORONARY SAN5BCRHEA

## (undated) DEVICE — PLATE GROUNDING ADULT W/CORD 9165L

## (undated) DEVICE — STRATAFIX 4-0

## (undated) DEVICE — BLANKET BAIR ADLT PLYMR 60X36IN 63000

## (undated) DEVICE — SUTURE VICRYL+ 0 27IN CT-1 UND VCP260H

## (undated) DEVICE — RX SURGIFLO HEMOSTATIC MATRIX W/THROMBIN 8ML 2994

## (undated) DEVICE — GLOVE GAMMEX NEOPRENE ULTRA SZ 7 LF 8514

## (undated) DEVICE — SOL WATER IRRIG 1000ML BOTTLE 2F7114

## (undated) DEVICE — SOL NACL 0.9% IRRIG 1000ML BOTTLE 2F7124

## (undated) DEVICE — ELECTRODE ADULT PACING MULTI P-211-M1

## (undated) DEVICE — DRSG DRAIN 4X4" 7086

## (undated) DEVICE — KIT HAND CONTROL ACIST 016795

## (undated) DEVICE — SUCTION CANISTER MEDIVAC LINER 3000ML W/LID 65651-530

## (undated) DEVICE — WIRE PACING TEMPORARY MYO 2-0

## (undated) DEVICE — MANIFOLD KIT ANGIO AUTOMATED 014613

## (undated) DEVICE — CATH GUIDING 5FR X 100CM LA5PAPA

## (undated) DEVICE — ADH SKIN CLOSURE PREMIERPRO EXOFIN 1.0ML 3470

## (undated) DEVICE — SHEATH GLIDE RADIAL 5FR 10CM .021

## (undated) DEVICE — SYR ANGIOGRAPHY MULTIUSE KIT ACIST 014612

## (undated) DEVICE — CATH THORACIC STRAIGHT CLOTSTOP 32FR

## (undated) DEVICE — SU STRATAFIX PDS PLUS 2-0 SPIRAL CT-1 30CM SXPP1B410

## (undated) DEVICE — PACK MINOR SINGLE BASIN SSK3001

## (undated) DEVICE — CATH URETHRAL 18FR 2EYE LTX 0094180

## (undated) DEVICE — SU ETHIBOND 3-0 BBDA 36" X588H

## (undated) DEVICE — SUCTION DRY CHEST DRAIN OASIS 3600-100

## (undated) DEVICE — SU PROLENE 4-0 SHDA 36" 8521H

## (undated) DEVICE — CABLE MYO/LEAD PACING WHITE DISP 019-530

## (undated) RX ORDER — PROPOFOL 10 MG/ML
INJECTION, EMULSION INTRAVENOUS
Status: DISPENSED
Start: 2022-08-29

## (undated) RX ORDER — ONDANSETRON 2 MG/ML
INJECTION INTRAMUSCULAR; INTRAVENOUS
Status: DISPENSED
Start: 2022-08-29

## (undated) RX ORDER — VANCOMYCIN HYDROCHLORIDE 1 G/20ML
INJECTION, POWDER, LYOPHILIZED, FOR SOLUTION INTRAVENOUS
Status: DISPENSED
Start: 2022-08-29

## (undated) RX ORDER — DEXAMETHASONE SODIUM PHOSPHATE 10 MG/ML
INJECTION INTRAMUSCULAR; INTRAVENOUS
Status: DISPENSED
Start: 2022-08-29

## (undated) RX ORDER — FENTANYL CITRATE 50 UG/ML
INJECTION, SOLUTION INTRAMUSCULAR; INTRAVENOUS
Status: DISPENSED
Start: 2022-08-29

## (undated) RX ORDER — PHENYLEPHRINE HYDROCHLORIDE 10 MG/ML
INJECTION INTRAVENOUS
Status: DISPENSED
Start: 2022-08-29

## (undated) RX ORDER — EPHEDRINE SULFATE 50 MG/ML
INJECTION, SOLUTION INTRAMUSCULAR; INTRAVENOUS; SUBCUTANEOUS
Status: DISPENSED
Start: 2022-08-29

## (undated) RX ORDER — DIAZEPAM 5 MG
TABLET ORAL
Status: DISPENSED
Start: 2022-08-25

## (undated) RX ORDER — FENTANYL CITRATE 50 UG/ML
INJECTION, SOLUTION INTRAMUSCULAR; INTRAVENOUS
Status: DISPENSED
Start: 2022-08-25